# Patient Record
Sex: MALE | Race: WHITE | NOT HISPANIC OR LATINO | Employment: OTHER | URBAN - METROPOLITAN AREA
[De-identification: names, ages, dates, MRNs, and addresses within clinical notes are randomized per-mention and may not be internally consistent; named-entity substitution may affect disease eponyms.]

---

## 2017-01-09 ENCOUNTER — OFFICE VISIT (OUTPATIENT)
Dept: NEUROSURGERY | Facility: CLINIC | Age: 82
End: 2017-01-09

## 2017-01-09 VITALS
BODY MASS INDEX: 30.92 KG/M2 | DIASTOLIC BLOOD PRESSURE: 64 MMHG | SYSTOLIC BLOOD PRESSURE: 136 MMHG | WEIGHT: 216 LBS | HEIGHT: 70 IN | TEMPERATURE: 97.3 F

## 2017-01-09 DIAGNOSIS — M51.36 DEGENERATIVE DISC DISEASE, LUMBAR: ICD-10-CM

## 2017-01-09 DIAGNOSIS — Z98.890 S/P LAMINECTOMY: Primary | ICD-10-CM

## 2017-01-09 DIAGNOSIS — M48.061 SPINAL STENOSIS OF LUMBAR REGION AT MULTIPLE LEVELS: ICD-10-CM

## 2017-01-09 PROCEDURE — 99024 POSTOP FOLLOW-UP VISIT: CPT | Performed by: NEUROLOGICAL SURGERY

## 2017-01-09 RX ORDER — OXYCODONE HYDROCHLORIDE AND ACETAMINOPHEN 5; 325 MG/1; MG/1
1 TABLET ORAL EVERY 8 HOURS PRN
Qty: 60 TABLET | Refills: 0 | Status: SHIPPED | OUTPATIENT
Start: 2017-01-09 | End: 2018-05-24 | Stop reason: SDUPTHER

## 2017-01-09 NOTE — PATIENT INSTRUCTIONS
Call Dr. Richmond in 2 weeks, on a Monday or Tuesday, and leave a message with Lola (). Dr. Richmond will call you back at the end of the day as soon as he can.

## 2017-01-09 NOTE — MR AVS SNAPSHOT
Miguel Angel Crane   1/9/2017 11:30 AM   Office Visit    Dept Phone:  [phone]   Encounter #:  [id number]    Provider:  George Richmond MD   Department:  [facility]                Your Full Care Plan              Today's Medication Changes          These changes are accurate as of: 1/9/17 11:33 AM.  If you have any questions, ask your nurse or doctor.               Medication(s)that have changed:     oxyCODONE-acetaminophen 5-325 MG per tablet   Commonly known as:  PERCOCET   Take 1 tablet by mouth Every 8 (Eight) Hours As Needed for severe pain (7-10).   What changed:  Another medication with the same name was removed. Continue taking this medication, and follow the directions you see here.       traMADol 50 MG tablet   Commonly known as:  ULTRAM   Take 50 mg by mouth Every 6 (Six) Hours As Needed for moderate pain (4-6).   What changed:  Another medication with the same name was removed. Continue taking this medication, and follow the directions you see here.            Where to Get Your Medications      You can get these medications from any pharmacy     Bring a paper prescription for each of these medications     oxyCODONE-acetaminophen 5-325 MG per tablet                  Your Updated Medication List          This list is accurate as of: 1/9/17 11:33 AM.  Always use your most recent med list.                amLODIPine 5 MG tablet   Commonly known as:  NORVASC   Take 1 tablet by mouth daily.       aspirin 81 MG chewable tablet       bethanechol 25 MG tablet   Commonly known as:  URECHOLINE   Take 1 tablet by mouth 3 (Three) Times a Day.       fluticasone 27.5 MCG/SPRAY nasal spray   Commonly known as:  VERAMYST       LAXATIVE FORMULA PO       levothyroxine 50 MCG tablet   Commonly known as:  SYNTHROID, LEVOTHROID       oxyCODONE-acetaminophen 5-325 MG per tablet   Commonly known as:  PERCOCET   Take 1 tablet by mouth Every 8 (Eight) Hours As  "Needed for severe pain (7-10).       PRESERVISION AREDS 2 PO       tamsulosin 0.4 MG capsule 24 hr capsule   Commonly known as:  FLOMAX       traMADol 50 MG tablet   Commonly known as:  ULTRAM               You Were Diagnosed With        Codes Comments    S/P laminectomy    -  Primary ICD-10-CM: Z98.890  ICD-9-CM: V45.89     Spinal stenosis of lumbar region at multiple levels     ICD-10-CM: M48.06  ICD-9-CM: 724.02     Degenerative disc disease, lumbar     ICD-10-CM: M51.36  ICD-9-CM: 722.52       Instructions      Call Dr. Richmond in 2 weeks, on a Monday or Tuesday, and leave a message with Lola (). Dr. Richmond will call you back at the end of the day as soon as he can.      Patient Instructions History      Upcoming Appointments     Visit Type Date Time Department    POST-OP 2017 11:30 AM [facility]      [employer] Signup     [facility] [employer] allows you to send messages to your doctor, view your test results, renew your prescriptions, schedule appointments, and more. To sign up, go to [employer] and click on the Sign Up Now link in the New User? box. Enter your [employer] Activation Code exactly as it appears below along with the last four digits of your Social Security Number and your Date of Birth () to complete the sign-up process. If you do not sign up before the expiration date, you must request a new code.    [employer] Activation Code: [email or web address]  Expires: 2017 11:28 AM    If you have questions, you can email Amanda Huff [email or web address] or call [phone] to talk to our [employer] staff. Remember, [employer] is NOT to be used for urgent needs. For medical emergencies, dial 911.               Other Info from Your Visit           Allergies     No Known Allergies      Vital Signs     Blood Pressure Temperature Height Weight Body Mass Index Smoking Status    136/64 97.3 °F (36.3 °C) 70\" (177.8 cm) 216 lb (98 kg) 30.99 kg/m2 Former Smoker      Problems and Diagnoses " Noted     Degeneration of lumbar or lumbosacral intervertebral disc    Spinal stenosis of lumbar region at multiple levels    Status post laminectomy    -  Primary

## 2017-01-09 NOTE — LETTER
January 12, 2017     Rocio Givens MD  [facility] [address]  [place] [address]    Patient: Miguel Angel Crane   YOB: 1932   Date of Visit: 1/9/2017       Dear Dr. Chon MD:    Miguel Angel Crane was in my office today. Below is a copy of my note.    If you have questions, please do not hesitate to call me. I look forward to following Miguel Angel along with you.         Sincerely,        George Richmond MD        CC: Mana Molina MD    Miguel Angel Crane  4/9/1932  [id number]                       CURRENT WORKING DIAGNOSIS:  Spinal stenosis with neurogenic claudication; advanced degenerative osteoarthritis          MEDICAL HISTORY SINCE LAST ENCOUNTER:  This patient underwent a laminectomy L3/L4 proximally 3 months ago.  He is doing well in the context of claudication.  He will always have back pain secondary to advanced degenerative joint disease throughout.  He has an appointment to see Dr. Tee for epidural and facet blocks this week.                Past Medical History   Diagnosis Date   • Arthritis    • BPH (benign prostatic hyperplasia)    • Cataracts, both eyes    • Chronic constipation    • Coronary artery disease    • Disease of thyroid gland    • Hypertension    • Kidney stones    • Left carotid stenosis    • Low back pain    • Myocardial infarct 1993   • Stroke      left MCA, secondary to left carotid stenosis              Past Surgical History   Procedure Laterality Date   • Tonsillectomy     • Carotid stent Left 02/03/2016     sx with prior CVA   • Cataract extraction w/ intraocular lens  implant, bilateral Bilateral    • Lumbar laminectomy discectomy decompression N/A 10/14/2016     Procedure: LUMBAR LAMINECTOMY  L3-L4;  Surgeon: George Richmond MD;  [facility]: [facility];  Service:               Family History   Problem Relation Age of Onset   • Heart disease Mother    • Heart disease Father               Social History     Social History   • Marital status:      Spouse name: N/A   •  Number of children: N/A   • Years of education: N/A     Occupational History   • Not on file.     Social History Main Topics   • Smoking status: Former Smoker     Packs/day: 0.50     Years: 42.00     Quit date: 1992   • Smokeless tobacco: Never Used      Comment: quit 24 years ago   • Alcohol use No      Comment: 12/25/1988   • Drug use: No   • Sexual activity: Defer     Other Topics Concern   • Not on file     Social History Narrative            No Known Allergies           Current Outpatient Prescriptions:   •  amLODIPine (NORVASC) 5 MG tablet, Take 1 tablet by mouth daily., Disp: 270 tablet, Rfl: 1  •  aspirin 81 MG chewable tablet, Chew 81 mg daily., Disp: , Rfl:   •  bethanechol (URECHOLINE) 25 MG tablet, Take 1 tablet by mouth 3 (Three) Times a Day., Disp: 30 tablet, Rfl: 0  •  fluticasone (VERAMYST) 27.5 MCG/SPRAY nasal spray, 2 sprays into each nostril daily., Disp: , Rfl:   •  levothyroxine (SYNTHROID, LEVOTHROID) 50 MCG tablet, Take 50 mcg by mouth daily., Disp: , Rfl:   •  Misc Natural Products (LAXATIVE FORMULA PO), Take 1 tablet by mouth Daily., Disp: , Rfl:   •  Multiple Vitamins-Minerals (PRESERVISION AREDS 2 PO), Take 1 tablet by mouth 2 (Two) Times a Day., Disp: , Rfl:   •  oxyCODONE-acetaminophen (PERCOCET) 5-325 MG per tablet, Take 1 tablet by mouth Every 8 (Eight) Hours As Needed for severe pain (7-10)., Disp: 60 tablet, Rfl: 0  •  oxyCODONE-acetaminophen (PERCOCET) 7.5-325 MG per tablet, Take 1 tablet by mouth Every 6 (Six) Hours As Needed for severe pain (7-10)., Disp: 60 tablet, Rfl: 0  •  tamsulosin (FLOMAX) 0.4 MG capsule 24 hr capsule, Take 1 capsule by mouth 2 (two) times a day., Disp: , Rfl:   •  traMADol (ULTRAM) 50 MG tablet, Take 50 mg by mouth Every 6 (Six) Hours As Needed for moderate pain (4-6)., Disp: , Rfl:   •  traMADol (ULTRAM) 50 MG tablet, Take 1 tablet by mouth Every 4 (Four) Hours As Needed for moderate pain (4-6)., Disp: 60 tablet, Rfl: 1         Review of Systems    Constitutional: Positive for fatigue. Negative for activity change, appetite change, chills, diaphoresis, fever and unexpected weight change.   HENT: Positive for postnasal drip and sinus pressure. Negative for congestion, dental problem, drooling, ear discharge, ear pain, facial swelling, hearing loss, mouth sores, nosebleeds, rhinorrhea, sneezing, sore throat, tinnitus, trouble swallowing and voice change.    Eyes: Negative.  Negative for photophobia, pain, discharge, redness, itching and visual disturbance.   Respiratory: Positive for shortness of breath and wheezing. Negative for apnea, cough, choking, chest tightness and stridor.    Cardiovascular: Negative for chest pain, palpitations and leg swelling.   Gastrointestinal: Positive for constipation. Negative for abdominal distention, abdominal pain, anal bleeding, blood in stool, diarrhea, nausea, rectal pain and vomiting.   Endocrine: Negative.  Negative for cold intolerance, heat intolerance, polydipsia, polyphagia and polyuria.   Genitourinary: Negative.  Negative for decreased urine volume, difficulty urinating, dysuria, enuresis, flank pain, frequency, genital sores, hematuria and urgency.   Musculoskeletal: Positive for arthralgias, back pain and myalgias. Negative for gait problem, joint swelling, neck pain and neck stiffness.   Skin: Negative for color change, pallor, rash and wound.   Allergic/Immunologic: Negative.  Negative for environmental allergies, food allergies and immunocompromised state.   Neurological: Positive for numbness. Negative for dizziness, tremors, seizures, syncope, facial asymmetry, speech difficulty, weakness, light-headedness and headaches.   Hematological: Negative.  Negative for adenopathy. Does not bruise/bleed easily.   Psychiatric/Behavioral: Negative.  Negative for agitation, behavioral problems, confusion, decreased concentration, dysphoric mood, hallucinations, self-injury, sleep disturbance and suicidal ideas. The  "patient is not nervous/anxious and is not hyperactive.    All other systems reviewed and are negative.              Vitals:    01/09/17 1103   BP: 136/64   Temp: 97.3 °F (36.3 °C)   Weight: 216 lb (98 kg)   Height: 70\" (177.8 cm)               EXAMINATION: His strength is excellent without sensory loss.  The deep tendon reflexes are hypoactive yet are elicitable with reinforcement.  Straight leg raising [place] flip test are negative.            MEDICAL DECISION MAKING: No evidence of progressive neurological deterioration.  He is actually had a good result with relief of claudication.]           ASSESSMENT/DISPOSITION: He is to see Dr. Tee for pain management.  I have given him prescription of Percocet 5/325 to take 3 times a day as needed.  I will request that Dr. Tee and or his PCP continue with his medicines on as-needed basis.  This will eliminate him having to drive the [place] [employer] which is difficult for this 84-year-old gentleman to  a prescription.  Clearly he is not a person we need to worry about with TEGAN.]              I APPRECIATE THE OPPORTUNITY OF THIS REFERRAL. PLEASE CALL IF ANY       QUESTIONS [phone]            Scribed for George Richmond MD by Kirstin Jackson CMA. 1/9/2017  11:06 AM          I have read and concur with the information provided by the scribe.      George Richmond MD  "

## 2017-01-09 NOTE — PROGRESS NOTES
Miguel Angel Crane  4/9/1932  [id number]                       CURRENT WORKING DIAGNOSIS:  Spinal stenosis with neurogenic claudication; advanced degenerative osteoarthritis          MEDICAL HISTORY SINCE LAST ENCOUNTER:  This patient underwent a laminectomy L3/L4 proximally 3 months ago.  He is doing well in the context of claudication.  He will always have back pain secondary to advanced degenerative joint disease throughout.  He has an appointment to see Dr. Tee for epidural and facet blocks this week.                Past Medical History   Diagnosis Date   • Arthritis    • BPH (benign prostatic hyperplasia)    • Cataracts, both eyes    • Chronic constipation    • Coronary artery disease    • Disease of thyroid gland    • Hypertension    • Kidney stones    • Left carotid stenosis    • Low back pain    • Myocardial infarct 1993   • Stroke      left MCA, secondary to left carotid stenosis              Past Surgical History   Procedure Laterality Date   • Tonsillectomy     • Carotid stent Left 02/03/2016     sx with prior CVA   • Cataract extraction w/ intraocular lens  implant, bilateral Bilateral    • Lumbar laminectomy discectomy decompression N/A 10/14/2016     Procedure: LUMBAR LAMINECTOMY  L3-L4;  Surgeon: George Richmond MD;  Location: [facility];  Service:               Family History   Problem Relation Age of Onset   • Heart disease Mother    • Heart disease Father               Social History     Social History   • Marital status:      Spouse name: N/A   • Number of children: N/A   • Years of education: N/A     Occupational History   • Not on file.     Social History Main Topics   • Smoking status: Former Smoker     Packs/day: 0.50     Years: 42.00     Quit date: 1992   • Smokeless tobacco: Never Used      Comment: quit 24 years ago   • Alcohol use No      Comment: 12/25/1988   • Drug use: No   • Sexual activity: Defer     Other Topics Concern   • Not on file     Social History Narrative             No Known Allergies           Current Outpatient Prescriptions:   •  amLODIPine (NORVASC) 5 MG tablet, Take 1 tablet by mouth daily., Disp: 270 tablet, Rfl: 1  •  aspirin 81 MG chewable tablet, Chew 81 mg daily., Disp: , Rfl:   •  bethanechol (URECHOLINE) 25 MG tablet, Take 1 tablet by mouth 3 (Three) Times a Day., Disp: 30 tablet, Rfl: 0  •  fluticasone (VERAMYST) 27.5 MCG/SPRAY nasal spray, 2 sprays into each nostril daily., Disp: , Rfl:   •  levothyroxine (SYNTHROID, LEVOTHROID) 50 MCG tablet, Take 50 mcg by mouth daily., Disp: , Rfl:   •  Misc Natural Products (LAXATIVE FORMULA PO), Take 1 tablet by mouth Daily., Disp: , Rfl:   •  Multiple Vitamins-Minerals (PRESERVISION AREDS 2 PO), Take 1 tablet by mouth 2 (Two) Times a Day., Disp: , Rfl:   •  oxyCODONE-acetaminophen (PERCOCET) 5-325 MG per tablet, Take 1 tablet by mouth Every 8 (Eight) Hours As Needed for severe pain (7-10)., Disp: 60 tablet, Rfl: 0  •  oxyCODONE-acetaminophen (PERCOCET) 7.5-325 MG per tablet, Take 1 tablet by mouth Every 6 (Six) Hours As Needed for severe pain (7-10)., Disp: 60 tablet, Rfl: 0  •  tamsulosin (FLOMAX) 0.4 MG capsule 24 hr capsule, Take 1 capsule by mouth 2 (two) times a day., Disp: , Rfl:   •  traMADol (ULTRAM) 50 MG tablet, Take 50 mg by mouth Every 6 (Six) Hours As Needed for moderate pain (4-6)., Disp: , Rfl:   •  traMADol (ULTRAM) 50 MG tablet, Take 1 tablet by mouth Every 4 (Four) Hours As Needed for moderate pain (4-6)., Disp: 60 tablet, Rfl: 1         Review of Systems   Constitutional: Positive for fatigue. Negative for activity change, appetite change, chills, diaphoresis, fever and unexpected weight change.   HENT: Positive for postnasal drip and sinus pressure. Negative for congestion, dental problem, drooling, ear discharge, ear pain, facial swelling, hearing loss, mouth sores, nosebleeds, rhinorrhea, sneezing, sore throat, tinnitus, trouble swallowing and voice change.    Eyes: Negative.  Negative for photophobia,  "pain, discharge, redness, itching and visual disturbance.   Respiratory: Positive for shortness of breath and wheezing. Negative for apnea, cough, choking, chest tightness and stridor.    Cardiovascular: Negative for chest pain, palpitations and leg swelling.   Gastrointestinal: Positive for constipation. Negative for abdominal distention, abdominal pain, anal bleeding, blood in stool, diarrhea, nausea, rectal pain and vomiting.   Endocrine: Negative.  Negative for cold intolerance, heat intolerance, polydipsia, polyphagia and polyuria.   Genitourinary: Negative.  Negative for decreased urine volume, difficulty urinating, dysuria, enuresis, flank pain, frequency, genital sores, hematuria and urgency.   Musculoskeletal: Positive for arthralgias, back pain and myalgias. Negative for gait problem, joint swelling, neck pain and neck stiffness.   Skin: Negative for color change, pallor, rash and wound.   Allergic/Immunologic: Negative.  Negative for environmental allergies, food allergies and immunocompromised state.   Neurological: Positive for numbness. Negative for dizziness, tremors, seizures, syncope, facial asymmetry, speech difficulty, weakness, light-headedness and headaches.   Hematological: Negative.  Negative for adenopathy. Does not bruise/bleed easily.   Psychiatric/Behavioral: Negative.  Negative for agitation, behavioral problems, confusion, decreased concentration, dysphoric mood, hallucinations, self-injury, sleep disturbance and suicidal ideas. The patient is not nervous/anxious and is not hyperactive.    All other systems reviewed and are negative.              Vitals:    01/09/17 1103   BP: 136/64   Temp: 97.3 °F (36.3 °C)   Weight: 216 lb (98 kg)   Height: 70\" (177.8 cm)               EXAMINATION: His strength is excellent without sensory loss.  The deep tendon reflexes are hypoactive yet are elicitable with reinforcement.  Straight leg raising [place] flip test are negative.            MEDICAL " DECISION MAKING: No evidence of progressive neurological deterioration.  He is actually had a good result with relief of claudication.]           ASSESSMENT/DISPOSITION: He is to see Dr. Tee for pain management.  I have given him prescription of Percocet 5/325 to take 3 times a day as needed.  I will request that Dr. Tee and or his PCP continue with his medicines on as-needed basis.  This will eliminate him having to drive the [employer] which is difficult for this 84-year-old gentleman to  a prescription.  Clearly he is not a person we need to worry about with TEGAN.]              I APPRECIATE THE OPPORTUNITY OF THIS REFERRAL. PLEASE CALL IF ANY       QUESTIONS [phone]            Scribed for George Richmond MD by Kirstin Jackson CMA. 1/9/2017  11:06 AM          I have read and concur with the information provided by the scribe.      George Richmond MD

## 2017-01-23 ENCOUNTER — TELEPHONE (OUTPATIENT)
Dept: NEUROSURGERY | Facility: CLINIC | Age: 82
End: 2017-01-23

## 2017-01-23 NOTE — TELEPHONE ENCOUNTER
----- Message from Lola Perez sent at 1/23/2017 10:20 AM EST -----  Contact: [phone]  PATIENT CALLING TO REPORT ON HIS CONDITION.  STATES HE IS DOING OKAY.  INJECTION THERAPY IS HELPING.      PATIENT IS IN EPIC.

## 2017-01-30 ENCOUNTER — TELEPHONE (OUTPATIENT)
Dept: NEUROSURGERY | Facility: CLINIC | Age: 82
End: 2017-01-30

## 2017-01-30 NOTE — TELEPHONE ENCOUNTER
----- Message from Lola Perez sent at 1/30/2017  4:42 PM EST -----  Contact: [phone]  PATIENT CALLING TO REPORT ON HIS CONDITION.  STATES HE IS DOING BETTER.  STATES HE CAN WALK ON LEG AND STILL TAKING SHOTS THAT ARE HELPFUL.    STATES TO SAY THANK YOU VERY MUCH.

## 2017-10-23 RX ORDER — AMLODIPINE BESYLATE 5 MG/1
5 TABLET ORAL DAILY
Qty: 30 TABLET | Refills: 2 | Status: SHIPPED | OUTPATIENT
Start: 2017-10-23 | End: 2018-01-19 | Stop reason: SDUPTHER

## 2017-10-23 NOTE — TELEPHONE ENCOUNTER
Provider:  Basilio  Pharmacy: [facility]  Surgery:  LAMI L3/4  Surgery Date:  10/14/16  Last visit:   01/09/17    Reason for call:       Automated refill request from patient's pharmacy

## 2018-01-19 RX ORDER — AMLODIPINE BESYLATE 5 MG/1
TABLET ORAL
Qty: 30 TABLET | Refills: 2 | Status: SHIPPED | OUTPATIENT
Start: 2018-01-19 | End: 2018-04-16 | Stop reason: SDUPTHER

## 2018-01-19 NOTE — TELEPHONE ENCOUNTER
Provider:  Basilio  Caller:  gilberto    Phone #:  automated  Surgery:  Lumbar Lami L3-4  Surgery Date:  10/14/16  Last visit:   1/9/17  Next visit: n/a    TEGAN:         Reason for call:       Amlodipine refill

## 2018-02-02 ENCOUNTER — DOCUMENTATION (OUTPATIENT)
Dept: NEUROSURGERY | Facility: CLINIC | Age: 83
End: 2018-02-02

## 2018-02-02 DIAGNOSIS — M51.36 DEGENERATIVE DISC DISEASE, LUMBAR: Primary | ICD-10-CM

## 2018-02-02 DIAGNOSIS — M48.062 SPINAL STENOSIS OF LUMBAR REGION WITH NEUROGENIC CLAUDICATION: ICD-10-CM

## 2018-02-02 DIAGNOSIS — M48.061 SPINAL STENOSIS OF LUMBAR REGION AT MULTIPLE LEVELS: ICD-10-CM

## 2018-02-02 NOTE — PROGRESS NOTES
Patient had laminectomy for severe spinal stenosis and neurogenic claudication. He continues to have significant arthritic pain and has sent a request to  requesting Percocet 5/325 qid. We will mail per Dr. Richmond.  Percocet5/325 qid, #[phone].    Shyanne Mack PA-C

## 2018-04-05 ENCOUNTER — DOCUMENTATION (OUTPATIENT)
Dept: NEUROSURGERY | Facility: CLINIC | Age: 83
End: 2018-04-05

## 2018-04-05 NOTE — PROGRESS NOTES
ATTENTION:      PT HAS BEEN RECEIVING PERCOCET FROM [facility], PM AND OUR OFFICE.  [facility] CONTACTED OUR OFFICE TO MAKE US AWARE.

## 2018-04-16 RX ORDER — AMLODIPINE BESYLATE 5 MG/1
TABLET ORAL
Qty: 30 TABLET | Refills: 2 | Status: SHIPPED | OUTPATIENT
Start: 2018-04-16 | End: 2018-07-30 | Stop reason: SDUPTHER

## 2018-04-16 NOTE — TELEPHONE ENCOUNTER
Provider:  Basilio  Pharmacy: [facility]  Surgery:  LAMI L3/4  Surgery Date:  10/14/16  Last visit:   01/09/17  Next visit: none scheduled    TEGAN:     Reason for call:       Automated refill request from patient's pharmacy

## 2018-04-30 ENCOUNTER — TELEPHONE (OUTPATIENT)
Dept: NEUROSURGERY | Facility: CLINIC | Age: 83
End: 2018-04-30

## 2018-05-01 NOTE — TELEPHONE ENCOUNTER
Please inform patient that we will not be refilling his pain medications. He needs to follow-up with his PCP or PM doctor to have this taken care of. If he is having new/different symptoms we would happily have him see us in office and re-evaluate, but since he is has not visited us since 2017 and we are following on a PRN basis we would feel more comfortable if his pain medicines were managed by his doctor who sees him regularly  Thanks, Cristina

## 2018-05-21 ENCOUNTER — TELEPHONE (OUTPATIENT)
Dept: NEUROSURGERY | Facility: CLINIC | Age: 83
End: 2018-05-21

## 2018-05-21 NOTE — TELEPHONE ENCOUNTER
Provider: Basilio    Caller: pt wife  Time of call: 1056    Phone #: [phone]  Surgery:  Lumbar lami L3/4  Surgery Date: 10/14/16   Last visit:1/9/17     Next visit: not scheduled        Reason for call:         Pt's wife left message stating that the pt would like to be seen in the office. However due to the date of last visit they will need a new referral. (I have tried calling both him and his wife to relay this information but was unable to reach them. We need to contact them to inform them of this.      family/patient

## 2018-05-24 RX ORDER — OXYCODONE HYDROCHLORIDE AND ACETAMINOPHEN 5; 325 MG/1; MG/1
1 TABLET ORAL EVERY 8 HOURS PRN
Qty: 60 TABLET | Refills: 0 | Status: SHIPPED | OUTPATIENT
Start: 2018-05-24 | End: 2019-11-05

## 2018-05-24 NOTE — TELEPHONE ENCOUNTER
Provider: Basilio    Caller: jayna   Time of call: 1143  Phone #: [phone]  Surgery:  Lumbar lami L3/4  Surgery Date: 10/14/16   Last visit:1/9/17     Next visit: none     TEGAN:       01/09/2018 Oxycodone/Acetaminophen  325MG/7.5MG  04/09/1932 [address] [facility]  [id number]  [place] [address]  02/06/2018 Oxycodone/Acetaminophen 325MG/5MG 04/09/1932 [address] George Richmond [facility] [address] [place] [address]  02/08/2018 Oxycodone/Acetaminophen  325MG/7.5MG  04/09/1932 [address] [facility]  [address]  [place] [address]  03/11/2018 Oxycodone/Acetaminophen  325MG/7.5MG  04/09/1932 [address] [facility]  [address]  [place] [address]  04/11/2018 Oxycodone/Acetaminophen  325MG/7.5MG  04/09/1932 [address] [facility]  [address]  [place] [address]  05/10/2018 Oxycodone/Acetaminophen  325MG/7.5MG  04/09/1932 [address] Chan [facility]  [address]  [place] [address]    Reason for call:         Pt left message stating that he is scheduled to see the PM clinic on 6/21 however he will be out of narcotic pain medication on the 30th of this month and wants to know if we can give him enough to last from that time until he gets in to see the PM doctor. (Tegan looks troubling)

## 2018-05-29 ENCOUNTER — TELEPHONE (OUTPATIENT)
Dept: NEUROSURGERY | Facility: CLINIC | Age: 83
End: 2018-05-29

## 2018-05-29 NOTE — TELEPHONE ENCOUNTER
Mr. Crane's PCP has said that he will no longer prescribe these medications. The patient has known lumbar stenosis, but Dr. Richmond feels that he is not a surgical candidate and that pain management is in his best interest. He is scheduled to start with PM on 6/21. We have given him a refill until that time, but are filling it at a lower dose.  Please do not schedule a follow up appointment here without approval from Dr Richmond or Deanna Mack.

## 2018-05-29 NOTE — TELEPHONE ENCOUNTER
"Provider: Basilio    Caller: teena Schilling  Time of call: 415  Phone #: [phone]  Surgery:  Lumbar lami L3/4  Surgery Date: 10/14/16   Last visit:1/9/17     Next visit: none      Reason for call:         Pharmacist called to request more information regarding our recent Rx for Percocet for him. She wants to know why we prescribed him a lower dose of narcotics when we knew that he was already receiving stronger narcotics from a different prescriber. I told her that I personally ran the [facility] and pointed out that it looked \"troubling\" and yet it was still approved however I would seek more information and call her back. She will not fill this until she is \"given a better explanation\" as to why we prescribed this.   "

## 2018-07-30 RX ORDER — AMLODIPINE BESYLATE 5 MG/1
TABLET ORAL
Qty: 30 TABLET | Refills: 2 | Status: SHIPPED | OUTPATIENT
Start: 2018-07-30 | End: 2018-11-01 | Stop reason: SDUPTHER

## 2018-07-30 NOTE — TELEPHONE ENCOUNTER
Provider:  Basilio  Pharmacy: [facility]  Surgery:  LAMI L3/4  Surgery Date:  10/14/16  Last visit:   01/09/17  Next visit: none scheduled    Reason for call:       Automated refill request from patient's pharmacy

## 2018-10-31 RX ORDER — AMLODIPINE BESYLATE 5 MG/1
TABLET ORAL
Qty: 30 TABLET | Refills: 2 | OUTPATIENT
Start: 2018-10-31

## 2018-11-01 RX ORDER — AMLODIPINE BESYLATE 5 MG/1
TABLET ORAL
Qty: 30 TABLET | Refills: 2 | Status: SHIPPED | OUTPATIENT
Start: 2018-11-01 | End: 2019-01-27 | Stop reason: SDUPTHER

## 2018-11-01 NOTE — TELEPHONE ENCOUNTER
Provider: Basilio   Caller:  Automated refill request  Surgery:  L3/4 LAMI  Surgery Date:  10/14/18  Last visit:  01/09/18  Next visit: NA    Reason for call:         Requested Prescriptions     Pending Prescriptions Disp Refills   • amLODIPine (NORVASC) 5 MG tablet [Pharmacy Med Name: AMLODIPINE 5MG TAB] 30 tablet 2     Sig: TAKE 1 TABLET BY MOUTH ONCE DAILY

## 2019-01-28 RX ORDER — AMLODIPINE BESYLATE 5 MG/1
TABLET ORAL
Qty: 30 TABLET | Refills: 2 | Status: SHIPPED | OUTPATIENT
Start: 2019-01-28 | End: 2019-04-28 | Stop reason: SDUPTHER

## 2019-01-28 NOTE — TELEPHONE ENCOUNTER
Provider: Basilio   Caller:  Automated refill request  Surgery:  L3/4 LAMI  Surgery Date:  10/14/18  Last visit:  01/09/18  Next visit: NA     Reason for call: Auto refill request from patient's pharmacy    Medication pended

## 2019-04-29 RX ORDER — AMLODIPINE BESYLATE 5 MG/1
TABLET ORAL
Qty: 90 TABLET | Refills: 0 | Status: SHIPPED | OUTPATIENT
Start: 2019-04-29 | End: 2020-01-11 | Stop reason: HOSPADM

## 2019-04-29 NOTE — TELEPHONE ENCOUNTER
Provider:  Basilio  Caller:  Automated refill request  Surgery:  L3/4 LAMI  Surgery Date:  10/14/16  Last visit:  01/09/17  Next visit: NA    Reason for call:         Requested Prescriptions     Pending Prescriptions Disp Refills   • amLODIPine (NORVASC) 5 MG tablet [Pharmacy Med Name: AMLODIPINE 5MG TAB] 90 tablet 0     Sig: TAKE 1 TABLET BY MOUTH ONCE DAILY

## 2019-07-24 RX ORDER — AMLODIPINE BESYLATE 5 MG/1
TABLET ORAL
Qty: 90 TABLET | Refills: 0 | OUTPATIENT
Start: 2019-07-24

## 2019-07-24 NOTE — TELEPHONE ENCOUNTER
We have not evaluated patient since 2017. He needs to see his PCP to have this medicine renewed as we are not managing his BP

## 2019-11-04 ENCOUNTER — TELEPHONE (OUTPATIENT)
Dept: NEUROSURGERY | Facility: CLINIC | Age: 84
End: 2019-11-04

## 2019-11-04 NOTE — TELEPHONE ENCOUNTER
Provider:  Deanna HARDIN  Caller: wife  Time of call:   4:39  Phone #:  [phone]  Surgery:   Lumbar lami  Surgery Date:  10/14/16  Last visit:   01/09/2017  Next visit: None    TEGAN:         Reason for call:     Mrs. Crane called and said the hospital in [place] told her  he has gallstones and they have not done anything about it.    She wants to know if Dr. Richmond or Deanna know of a good surgeon.

## 2019-11-05 ENCOUNTER — APPOINTMENT (OUTPATIENT)
Dept: ULTRASOUND IMAGING | Facility: HOSPITAL | Age: 84
End: 2019-11-05

## 2019-11-05 ENCOUNTER — APPOINTMENT (OUTPATIENT)
Dept: CT IMAGING | Facility: HOSPITAL | Age: 84
End: 2019-11-05

## 2019-11-05 ENCOUNTER — HOSPITAL ENCOUNTER (INPATIENT)
Facility: HOSPITAL | Age: 84
LOS: 6 days | Discharge: HOME | End: 2019-11-11
Attending: EMERGENCY MEDICINE | Admitting: HOSPITALIST

## 2019-11-05 DIAGNOSIS — N28.9 RENAL INSUFFICIENCY: ICD-10-CM

## 2019-11-05 DIAGNOSIS — Z74.09 IMPAIRED MOBILITY AND ADLS: ICD-10-CM

## 2019-11-05 DIAGNOSIS — N20.1 RIGHT URETERAL STONE: Primary | ICD-10-CM

## 2019-11-05 DIAGNOSIS — K80.20 CALCULUS OF GALLBLADDER WITHOUT CHOLECYSTITIS WITHOUT OBSTRUCTION: ICD-10-CM

## 2019-11-05 DIAGNOSIS — K86.89 PANCREATIC MASS: ICD-10-CM

## 2019-11-05 DIAGNOSIS — R10.9 RIGHT SIDED ABDOMINAL PAIN: ICD-10-CM

## 2019-11-05 DIAGNOSIS — N13.30 HYDRONEPHROSIS OF RIGHT KIDNEY: ICD-10-CM

## 2019-11-05 DIAGNOSIS — Z78.9 IMPAIRED MOBILITY AND ADLS: ICD-10-CM

## 2019-11-05 PROBLEM — R93.89 ABNORMAL CT SCAN: Status: ACTIVE | Noted: 2019-11-05

## 2019-11-05 PROBLEM — E66.9 OBESITY (BMI 30-39.9): Status: ACTIVE | Noted: 2019-11-05

## 2019-11-05 PROBLEM — K82.9 GALLBLADDER DISEASE: Status: ACTIVE | Noted: 2019-11-05

## 2019-11-05 PROBLEM — N17.9 AKI (ACUTE KIDNEY INJURY): Status: ACTIVE | Noted: 2019-11-05

## 2019-11-05 LAB
ALBUMIN SERPL-MCNC: 3.4 G/DL (ref 3.5–5.2)
ALBUMIN/GLOB SERPL: 1 G/DL
ALP SERPL-CCNC: 91 U/L (ref 39–117)
ALT SERPL W P-5'-P-CCNC: 18 U/L (ref 1–41)
ANION GAP SERPL CALCULATED.3IONS-SCNC: 11 MMOL/L (ref 5–15)
AST SERPL-CCNC: 20 U/L (ref 1–40)
BASOPHILS # BLD AUTO: 0.05 10*3/MM3 (ref 0–0.2)
BASOPHILS NFR BLD AUTO: 0.8 % (ref 0–1.5)
BILIRUB SERPL-MCNC: 0.5 MG/DL (ref 0.2–1.2)
BILIRUB UR QL STRIP: NEGATIVE
BUN BLD-MCNC: 13 MG/DL (ref 8–23)
BUN/CREAT SERPL: 6.5 (ref 7–25)
CALCIUM SPEC-SCNC: 8.9 MG/DL (ref 8.6–10.5)
CHLORIDE SERPL-SCNC: 98 MMOL/L (ref 98–107)
CLARITY UR: CLEAR
CO2 SERPL-SCNC: 29 MMOL/L (ref 22–29)
COLOR UR: YELLOW
CREAT BLD-MCNC: 2 MG/DL (ref 0.76–1.27)
D-LACTATE SERPL-SCNC: 1.4 MMOL/L (ref 0.5–2)
DEPRECATED RDW RBC AUTO: 48.2 FL (ref 37–54)
EOSINOPHIL # BLD AUTO: 0.27 10*3/MM3 (ref 0–0.4)
EOSINOPHIL NFR BLD AUTO: 4.1 % (ref 0.3–6.2)
ERYTHROCYTE [DISTWIDTH] IN BLOOD BY AUTOMATED COUNT: 13.7 % (ref 12.3–15.4)
GFR SERPL CREATININE-BSD FRML MDRD: 32 ML/MIN/1.73
GLOBULIN UR ELPH-MCNC: 3.5 GM/DL
GLUCOSE BLD-MCNC: 114 MG/DL (ref 65–99)
GLUCOSE UR STRIP-MCNC: NEGATIVE MG/DL
HCT VFR BLD AUTO: 39.1 % (ref 37.5–51)
HGB BLD-MCNC: 12.8 G/DL (ref 13–17.7)
HGB UR QL STRIP.AUTO: NEGATIVE
HOLD SPECIMEN: NORMAL
HOLD SPECIMEN: NORMAL
IMM GRANULOCYTES # BLD AUTO: 0.06 10*3/MM3 (ref 0–0.05)
IMM GRANULOCYTES NFR BLD AUTO: 0.9 % (ref 0–0.5)
KETONES UR QL STRIP: ABNORMAL
LEUKOCYTE ESTERASE UR QL STRIP.AUTO: NEGATIVE
LIPASE SERPL-CCNC: 9 U/L (ref 13–60)
LYMPHOCYTES # BLD AUTO: 1.76 10*3/MM3 (ref 0.7–3.1)
LYMPHOCYTES NFR BLD AUTO: 26.9 % (ref 19.6–45.3)
MCH RBC QN AUTO: 31.4 PG (ref 26.6–33)
MCHC RBC AUTO-ENTMCNC: 32.7 G/DL (ref 31.5–35.7)
MCV RBC AUTO: 96.1 FL (ref 79–97)
MONOCYTES # BLD AUTO: 0.59 10*3/MM3 (ref 0.1–0.9)
MONOCYTES NFR BLD AUTO: 9 % (ref 5–12)
NEUTROPHILS # BLD AUTO: 3.82 10*3/MM3 (ref 1.7–7)
NEUTROPHILS NFR BLD AUTO: 58.3 % (ref 42.7–76)
NITRITE UR QL STRIP: NEGATIVE
NRBC BLD AUTO-RTO: 0 /100 WBC (ref 0–0.2)
PH UR STRIP.AUTO: >=9 [PH] (ref 5–8)
PLATELET # BLD AUTO: 218 10*3/MM3 (ref 140–450)
PMV BLD AUTO: 9.7 FL (ref 6–12)
POTASSIUM BLD-SCNC: 4.1 MMOL/L (ref 3.5–5.2)
PROT SERPL-MCNC: 6.9 G/DL (ref 6–8.5)
PROT UR QL STRIP: NEGATIVE
RBC # BLD AUTO: 4.07 10*6/MM3 (ref 4.14–5.8)
SODIUM BLD-SCNC: 138 MMOL/L (ref 136–145)
SP GR UR STRIP: 1.02 (ref 1–1.03)
UROBILINOGEN UR QL STRIP: ABNORMAL
WBC NRBC COR # BLD: 6.55 10*3/MM3 (ref 3.4–10.8)
WHOLE BLOOD HOLD SPECIMEN: NORMAL
WHOLE BLOOD HOLD SPECIMEN: NORMAL

## 2019-11-05 PROCEDURE — 81003 URINALYSIS AUTO W/O SCOPE: CPT | Performed by: EMERGENCY MEDICINE

## 2019-11-05 PROCEDURE — 25010000002 ONDANSETRON PER 1 MG: Performed by: EMERGENCY MEDICINE

## 2019-11-05 PROCEDURE — 25010000002 MORPHINE PER 10 MG: Performed by: EMERGENCY MEDICINE

## 2019-11-05 PROCEDURE — 83690 ASSAY OF LIPASE: CPT | Performed by: EMERGENCY MEDICINE

## 2019-11-05 PROCEDURE — 76705 ECHO EXAM OF ABDOMEN: CPT

## 2019-11-05 PROCEDURE — 99284 EMERGENCY DEPT VISIT MOD MDM: CPT

## 2019-11-05 PROCEDURE — 80053 COMPREHEN METABOLIC PANEL: CPT | Performed by: EMERGENCY MEDICINE

## 2019-11-05 PROCEDURE — 85025 COMPLETE CBC W/AUTO DIFF WBC: CPT

## 2019-11-05 PROCEDURE — 99223 1ST HOSP IP/OBS HIGH 75: CPT | Performed by: INTERNAL MEDICINE

## 2019-11-05 PROCEDURE — 83605 ASSAY OF LACTIC ACID: CPT | Performed by: EMERGENCY MEDICINE

## 2019-11-05 PROCEDURE — 74176 CT ABD & PELVIS W/O CONTRAST: CPT

## 2019-11-05 RX ORDER — POTASSIUM CHLORIDE 750 MG/1
40 CAPSULE, EXTENDED RELEASE ORAL AS NEEDED
Status: DISCONTINUED | OUTPATIENT
Start: 2019-11-05 | End: 2019-11-11 | Stop reason: HOSPADM

## 2019-11-05 RX ORDER — NALOXONE HCL 0.4 MG/ML
0.4 VIAL (ML) INJECTION
Status: DISCONTINUED | OUTPATIENT
Start: 2019-11-05 | End: 2019-11-09

## 2019-11-05 RX ORDER — MAGNESIUM SULFATE HEPTAHYDRATE 40 MG/ML
4 INJECTION, SOLUTION INTRAVENOUS AS NEEDED
Status: DISCONTINUED | OUTPATIENT
Start: 2019-11-05 | End: 2019-11-11 | Stop reason: HOSPADM

## 2019-11-05 RX ORDER — ONDANSETRON 4 MG/1
4 TABLET, FILM COATED ORAL EVERY 6 HOURS PRN
Status: DISCONTINUED | OUTPATIENT
Start: 2019-11-05 | End: 2019-11-11 | Stop reason: HOSPADM

## 2019-11-05 RX ORDER — ONDANSETRON 2 MG/ML
4 INJECTION INTRAMUSCULAR; INTRAVENOUS ONCE
Status: COMPLETED | OUTPATIENT
Start: 2019-11-05 | End: 2019-11-05

## 2019-11-05 RX ORDER — SODIUM CHLORIDE 0.9 % (FLUSH) 0.9 %
10 SYRINGE (ML) INJECTION EVERY 12 HOURS SCHEDULED
Status: DISCONTINUED | OUTPATIENT
Start: 2019-11-05 | End: 2019-11-11 | Stop reason: HOSPADM

## 2019-11-05 RX ORDER — POTASSIUM CHLORIDE 1.5 G/1.77G
40 POWDER, FOR SOLUTION ORAL AS NEEDED
Status: DISCONTINUED | OUTPATIENT
Start: 2019-11-05 | End: 2019-11-11 | Stop reason: HOSPADM

## 2019-11-05 RX ORDER — SODIUM CHLORIDE 0.9 % (FLUSH) 0.9 %
10 SYRINGE (ML) INJECTION AS NEEDED
Status: DISCONTINUED | OUTPATIENT
Start: 2019-11-05 | End: 2019-11-11 | Stop reason: HOSPADM

## 2019-11-05 RX ORDER — ONDANSETRON 2 MG/ML
4 INJECTION INTRAMUSCULAR; INTRAVENOUS EVERY 6 HOURS PRN
Status: DISCONTINUED | OUTPATIENT
Start: 2019-11-05 | End: 2019-11-11 | Stop reason: HOSPADM

## 2019-11-05 RX ORDER — HYDROMORPHONE HYDROCHLORIDE 1 MG/ML
0.5 INJECTION, SOLUTION INTRAMUSCULAR; INTRAVENOUS; SUBCUTANEOUS
Status: DISCONTINUED | OUTPATIENT
Start: 2019-11-05 | End: 2019-11-09

## 2019-11-05 RX ORDER — SODIUM CHLORIDE 9 MG/ML
75 INJECTION, SOLUTION INTRAVENOUS CONTINUOUS
Status: DISCONTINUED | OUTPATIENT
Start: 2019-11-05 | End: 2019-11-09

## 2019-11-05 RX ORDER — MORPHINE SULFATE 4 MG/ML
4 INJECTION, SOLUTION INTRAMUSCULAR; INTRAVENOUS ONCE
Status: COMPLETED | OUTPATIENT
Start: 2019-11-05 | End: 2019-11-05

## 2019-11-05 RX ORDER — ACETAMINOPHEN 160 MG/5ML
650 SOLUTION ORAL EVERY 4 HOURS PRN
Status: DISCONTINUED | OUTPATIENT
Start: 2019-11-05 | End: 2019-11-11 | Stop reason: HOSPADM

## 2019-11-05 RX ORDER — AMOXICILLIN 250 MG
2 CAPSULE ORAL NIGHTLY
Status: DISCONTINUED | OUTPATIENT
Start: 2019-11-05 | End: 2019-11-11 | Stop reason: HOSPADM

## 2019-11-05 RX ORDER — GABAPENTIN 100 MG/1
200 CAPSULE ORAL NIGHTLY
COMMUNITY
End: 2020-02-21

## 2019-11-05 RX ORDER — POTASSIUM CHLORIDE 7.45 MG/ML
10 INJECTION INTRAVENOUS
Status: DISCONTINUED | OUTPATIENT
Start: 2019-11-05 | End: 2019-11-11 | Stop reason: HOSPADM

## 2019-11-05 RX ORDER — MAGNESIUM SULFATE HEPTAHYDRATE 40 MG/ML
2 INJECTION, SOLUTION INTRAVENOUS AS NEEDED
Status: DISCONTINUED | OUTPATIENT
Start: 2019-11-05 | End: 2019-11-11 | Stop reason: HOSPADM

## 2019-11-05 RX ORDER — ACETAMINOPHEN 650 MG/1
650 SUPPOSITORY RECTAL EVERY 4 HOURS PRN
Status: DISCONTINUED | OUTPATIENT
Start: 2019-11-05 | End: 2019-11-11 | Stop reason: HOSPADM

## 2019-11-05 RX ORDER — LEVOTHYROXINE SODIUM 0.05 MG/1
50 TABLET ORAL
Status: DISCONTINUED | OUTPATIENT
Start: 2019-11-06 | End: 2019-11-11 | Stop reason: HOSPADM

## 2019-11-05 RX ORDER — FLUTICASONE PROPIONATE 50 MCG
2 SPRAY, SUSPENSION (ML) NASAL DAILY
Status: DISCONTINUED | OUTPATIENT
Start: 2019-11-05 | End: 2019-11-11 | Stop reason: HOSPADM

## 2019-11-05 RX ORDER — GABAPENTIN 100 MG/1
200 CAPSULE ORAL NIGHTLY
Status: DISCONTINUED | OUTPATIENT
Start: 2019-11-05 | End: 2019-11-11 | Stop reason: HOSPADM

## 2019-11-05 RX ORDER — OXYCODONE HYDROCHLORIDE AND ACETAMINOPHEN 5; 325 MG/1; MG/1
1 TABLET ORAL EVERY 4 HOURS PRN
Status: DISCONTINUED | OUTPATIENT
Start: 2019-11-05 | End: 2019-11-11 | Stop reason: HOSPADM

## 2019-11-05 RX ORDER — ACETAMINOPHEN 325 MG/1
650 TABLET ORAL EVERY 4 HOURS PRN
Status: DISCONTINUED | OUTPATIENT
Start: 2019-11-05 | End: 2019-11-11 | Stop reason: HOSPADM

## 2019-11-05 RX ORDER — TAMSULOSIN HYDROCHLORIDE 0.4 MG/1
0.4 CAPSULE ORAL DAILY
Status: DISCONTINUED | OUTPATIENT
Start: 2019-11-05 | End: 2019-11-11 | Stop reason: HOSPADM

## 2019-11-05 RX ORDER — AMLODIPINE BESYLATE 10 MG/1
5 TABLET ORAL DAILY
Status: DISCONTINUED | OUTPATIENT
Start: 2019-11-05 | End: 2019-11-11 | Stop reason: HOSPADM

## 2019-11-05 RX ADMIN — ONDANSETRON 4 MG: 2 INJECTION INTRAMUSCULAR; INTRAVENOUS at 13:37

## 2019-11-05 RX ADMIN — SODIUM CHLORIDE 125 ML/HR: 9 INJECTION, SOLUTION INTRAVENOUS at 18:33

## 2019-11-05 RX ADMIN — OXYCODONE HYDROCHLORIDE AND ACETAMINOPHEN 1 TABLET: 5; 325 TABLET ORAL at 18:40

## 2019-11-05 RX ADMIN — SENNOSIDES AND DOCUSATE SODIUM 2 TABLET: 8.6; 5 TABLET ORAL at 20:36

## 2019-11-05 RX ADMIN — FLUTICASONE PROPIONATE 2 SPRAY: 50 SPRAY, METERED NASAL at 20:36

## 2019-11-05 RX ADMIN — GABAPENTIN 200 MG: 100 CAPSULE ORAL at 20:36

## 2019-11-05 RX ADMIN — MAGNESIUM HYDROXIDE 10 ML: 2400 SUSPENSION ORAL at 20:36

## 2019-11-05 RX ADMIN — SODIUM CHLORIDE 1000 ML: 9 INJECTION, SOLUTION INTRAVENOUS at 13:37

## 2019-11-05 RX ADMIN — AMLODIPINE BESYLATE 5 MG: 10 TABLET ORAL at 18:33

## 2019-11-05 RX ADMIN — MORPHINE SULFATE 4 MG: 4 INJECTION INTRAVENOUS at 13:38

## 2019-11-05 RX ADMIN — TAMSULOSIN HYDROCHLORIDE 0.4 MG: 0.4 CAPSULE ORAL at 20:36

## 2019-11-06 ENCOUNTER — APPOINTMENT (OUTPATIENT)
Dept: GENERAL RADIOLOGY | Facility: HOSPITAL | Age: 84
End: 2019-11-06

## 2019-11-06 ENCOUNTER — ANESTHESIA EVENT (OUTPATIENT)
Dept: PERIOP | Facility: HOSPITAL | Age: 84
End: 2019-11-06

## 2019-11-06 ENCOUNTER — ANESTHESIA (OUTPATIENT)
Dept: PERIOP | Facility: HOSPITAL | Age: 84
End: 2019-11-06

## 2019-11-06 LAB
ANION GAP SERPL CALCULATED.3IONS-SCNC: 9 MMOL/L (ref 5–15)
BUN BLD-MCNC: 13 MG/DL (ref 8–23)
BUN/CREAT SERPL: 8.1 (ref 7–25)
CALCIUM SPEC-SCNC: 8.1 MG/DL (ref 8.6–10.5)
CHLORIDE SERPL-SCNC: 102 MMOL/L (ref 98–107)
CO2 SERPL-SCNC: 28 MMOL/L (ref 22–29)
CREAT BLD-MCNC: 1.61 MG/DL (ref 0.76–1.27)
DEPRECATED RDW RBC AUTO: 49.2 FL (ref 37–54)
ERYTHROCYTE [DISTWIDTH] IN BLOOD BY AUTOMATED COUNT: 13.8 % (ref 12.3–15.4)
GFR SERPL CREATININE-BSD FRML MDRD: 41 ML/MIN/1.73
GLUCOSE BLD-MCNC: 127 MG/DL (ref 65–99)
HCT VFR BLD AUTO: 33.8 % (ref 37.5–51)
HGB BLD-MCNC: 10.8 G/DL (ref 13–17.7)
MCH RBC QN AUTO: 31.2 PG (ref 26.6–33)
MCHC RBC AUTO-ENTMCNC: 32 G/DL (ref 31.5–35.7)
MCV RBC AUTO: 97.7 FL (ref 79–97)
PLATELET # BLD AUTO: 168 10*3/MM3 (ref 140–450)
PMV BLD AUTO: 9.7 FL (ref 6–12)
POTASSIUM BLD-SCNC: 4.2 MMOL/L (ref 3.5–5.2)
RBC # BLD AUTO: 3.46 10*6/MM3 (ref 4.14–5.8)
SODIUM BLD-SCNC: 139 MMOL/L (ref 136–145)
WBC NRBC COR # BLD: 5.15 10*3/MM3 (ref 3.4–10.8)

## 2019-11-06 PROCEDURE — 80048 BASIC METABOLIC PNL TOTAL CA: CPT | Performed by: INTERNAL MEDICINE

## 2019-11-06 PROCEDURE — C1758 CATHETER, URETERAL: HCPCS | Performed by: UROLOGY

## 2019-11-06 PROCEDURE — 76000 FLUOROSCOPY <1 HR PHYS/QHP: CPT

## 2019-11-06 PROCEDURE — 99232 SBSQ HOSP IP/OBS MODERATE 35: CPT | Performed by: INTERNAL MEDICINE

## 2019-11-06 PROCEDURE — 0T768DZ DILATION OF RIGHT URETER WITH INTRALUMINAL DEVICE, VIA NATURAL OR ARTIFICIAL OPENING ENDOSCOPIC: ICD-10-PCS | Performed by: UROLOGY

## 2019-11-06 PROCEDURE — 85027 COMPLETE CBC AUTOMATED: CPT | Performed by: INTERNAL MEDICINE

## 2019-11-06 PROCEDURE — 25010000002 LEVOFLOXACIN PER 250 MG: Performed by: UROLOGY

## 2019-11-06 PROCEDURE — 25010000002 PHENYLEPHRINE PER 1 ML: Performed by: ANESTHESIOLOGY

## 2019-11-06 PROCEDURE — 25010000002 PROPOFOL 10 MG/ML EMULSION: Performed by: ANESTHESIOLOGY

## 2019-11-06 PROCEDURE — C1769 GUIDE WIRE: HCPCS | Performed by: UROLOGY

## 2019-11-06 PROCEDURE — 93005 ELECTROCARDIOGRAM TRACING: CPT | Performed by: ANESTHESIOLOGY

## 2019-11-06 PROCEDURE — C2617 STENT, NON-COR, TEM W/O DEL: HCPCS | Performed by: UROLOGY

## 2019-11-06 PROCEDURE — 25010000003 LIDOCAINE 1 % SOLUTION: Performed by: ANESTHESIOLOGY

## 2019-11-06 DEVICE — URETERAL STENT
Type: IMPLANTABLE DEVICE | Status: FUNCTIONAL
Brand: PERCUFLEX™ PLUS

## 2019-11-06 RX ORDER — FENTANYL CITRATE 50 UG/ML
50 INJECTION, SOLUTION INTRAMUSCULAR; INTRAVENOUS
Status: DISCONTINUED | OUTPATIENT
Start: 2019-11-06 | End: 2019-11-06 | Stop reason: HOSPADM

## 2019-11-06 RX ORDER — LEVOFLOXACIN 5 MG/ML
500 INJECTION, SOLUTION INTRAVENOUS ONCE
Status: COMPLETED | OUTPATIENT
Start: 2019-11-06 | End: 2019-11-06

## 2019-11-06 RX ORDER — FAMOTIDINE 10 MG/ML
20 INJECTION, SOLUTION INTRAVENOUS
Status: COMPLETED | OUTPATIENT
Start: 2019-11-06 | End: 2019-11-06

## 2019-11-06 RX ORDER — MAGNESIUM HYDROXIDE 1200 MG/15ML
LIQUID ORAL AS NEEDED
Status: DISCONTINUED | OUTPATIENT
Start: 2019-11-06 | End: 2019-11-06 | Stop reason: HOSPADM

## 2019-11-06 RX ORDER — SODIUM CHLORIDE 0.9 % (FLUSH) 0.9 %
3 SYRINGE (ML) INJECTION EVERY 12 HOURS SCHEDULED
Status: DISCONTINUED | OUTPATIENT
Start: 2019-11-06 | End: 2019-11-06 | Stop reason: HOSPADM

## 2019-11-06 RX ORDER — FAMOTIDINE 10 MG/ML
20 INJECTION, SOLUTION INTRAVENOUS ONCE
Status: DISCONTINUED | OUTPATIENT
Start: 2019-11-06 | End: 2019-11-06 | Stop reason: HOSPADM

## 2019-11-06 RX ORDER — SODIUM CHLORIDE 0.9 % (FLUSH) 0.9 %
3-10 SYRINGE (ML) INJECTION AS NEEDED
Status: DISCONTINUED | OUTPATIENT
Start: 2019-11-06 | End: 2019-11-06 | Stop reason: HOSPADM

## 2019-11-06 RX ORDER — SODIUM CHLORIDE, SODIUM LACTATE, POTASSIUM CHLORIDE, CALCIUM CHLORIDE 600; 310; 30; 20 MG/100ML; MG/100ML; MG/100ML; MG/100ML
9 INJECTION, SOLUTION INTRAVENOUS CONTINUOUS
Status: DISCONTINUED | OUTPATIENT
Start: 2019-11-06 | End: 2019-11-11 | Stop reason: HOSPADM

## 2019-11-06 RX ORDER — FAMOTIDINE 20 MG/1
20 TABLET, FILM COATED ORAL ONCE
Status: DISCONTINUED | OUTPATIENT
Start: 2019-11-06 | End: 2019-11-06 | Stop reason: HOSPADM

## 2019-11-06 RX ORDER — PROPOFOL 10 MG/ML
VIAL (ML) INTRAVENOUS AS NEEDED
Status: DISCONTINUED | OUTPATIENT
Start: 2019-11-06 | End: 2019-11-06 | Stop reason: SURG

## 2019-11-06 RX ORDER — FAMOTIDINE 20 MG/1
20 TABLET, FILM COATED ORAL
Status: COMPLETED | OUTPATIENT
Start: 2019-11-06 | End: 2019-11-06

## 2019-11-06 RX ORDER — LIDOCAINE HYDROCHLORIDE 10 MG/ML
INJECTION, SOLUTION INFILTRATION; PERINEURAL AS NEEDED
Status: DISCONTINUED | OUTPATIENT
Start: 2019-11-06 | End: 2019-11-06 | Stop reason: SURG

## 2019-11-06 RX ORDER — SODIUM CHLORIDE, SODIUM LACTATE, POTASSIUM CHLORIDE, CALCIUM CHLORIDE 600; 310; 30; 20 MG/100ML; MG/100ML; MG/100ML; MG/100ML
9 INJECTION, SOLUTION INTRAVENOUS CONTINUOUS PRN
Status: DISCONTINUED | OUTPATIENT
Start: 2019-11-06 | End: 2019-11-11 | Stop reason: HOSPADM

## 2019-11-06 RX ORDER — LIDOCAINE HYDROCHLORIDE 10 MG/ML
0.5 INJECTION, SOLUTION EPIDURAL; INFILTRATION; INTRACAUDAL; PERINEURAL ONCE AS NEEDED
Status: DISCONTINUED | OUTPATIENT
Start: 2019-11-06 | End: 2019-11-06 | Stop reason: HOSPADM

## 2019-11-06 RX ORDER — SODIUM CHLORIDE 9 MG/ML
INJECTION, SOLUTION INTRAVENOUS AS NEEDED
Status: DISCONTINUED | OUTPATIENT
Start: 2019-11-06 | End: 2019-11-06 | Stop reason: HOSPADM

## 2019-11-06 RX ADMIN — FLUTICASONE PROPIONATE 2 SPRAY: 50 SPRAY, METERED NASAL at 09:39

## 2019-11-06 RX ADMIN — PROPOFOL 100 MG: 10 INJECTION, EMULSION INTRAVENOUS at 19:24

## 2019-11-06 RX ADMIN — OXYCODONE HYDROCHLORIDE AND ACETAMINOPHEN 1 TABLET: 5; 325 TABLET ORAL at 10:03

## 2019-11-06 RX ADMIN — TAMSULOSIN HYDROCHLORIDE 0.4 MG: 0.4 CAPSULE ORAL at 09:38

## 2019-11-06 RX ADMIN — LEVOFLOXACIN 500 MG: 5 INJECTION, SOLUTION INTRAVENOUS at 19:25

## 2019-11-06 RX ADMIN — SODIUM CHLORIDE, POTASSIUM CHLORIDE, SODIUM LACTATE AND CALCIUM CHLORIDE 9 ML/HR: 600; 310; 30; 20 INJECTION, SOLUTION INTRAVENOUS at 16:02

## 2019-11-06 RX ADMIN — SODIUM CHLORIDE 125 ML/HR: 9 INJECTION, SOLUTION INTRAVENOUS at 09:44

## 2019-11-06 RX ADMIN — SODIUM CHLORIDE, POTASSIUM CHLORIDE, SODIUM LACTATE AND CALCIUM CHLORIDE: 600; 310; 30; 20 INJECTION, SOLUTION INTRAVENOUS at 19:16

## 2019-11-06 RX ADMIN — PHENYLEPHRINE HYDROCHLORIDE 80 MCG: 10 INJECTION INTRAVENOUS at 19:24

## 2019-11-06 RX ADMIN — SODIUM CHLORIDE, PRESERVATIVE FREE 10 ML: 5 INJECTION INTRAVENOUS at 09:40

## 2019-11-06 RX ADMIN — FAMOTIDINE 20 MG: 10 INJECTION, SOLUTION INTRAVENOUS at 16:02

## 2019-11-06 RX ADMIN — LEVOTHYROXINE SODIUM 50 MCG: 50 TABLET ORAL at 07:02

## 2019-11-06 RX ADMIN — OXYCODONE HYDROCHLORIDE AND ACETAMINOPHEN 1 TABLET: 5; 325 TABLET ORAL at 04:09

## 2019-11-06 RX ADMIN — AMLODIPINE BESYLATE 5 MG: 10 TABLET ORAL at 09:38

## 2019-11-06 RX ADMIN — GABAPENTIN 200 MG: 100 CAPSULE ORAL at 21:44

## 2019-11-06 RX ADMIN — LIDOCAINE HYDROCHLORIDE 25 MG: 10 INJECTION, SOLUTION INFILTRATION; PERINEURAL at 19:24

## 2019-11-06 NOTE — ANESTHESIA PREPROCEDURE EVALUATION
Anesthesia Evaluation     Patient summary reviewed and Nursing notes reviewed   no history of anesthetic complications:  NPO Solid Status: > 8 hours  NPO Liquid Status: > 8 hours           Airway   Mallampati: III  TM distance: <3 FB  Neck ROM: full  no difficulty expected  Dental    (+) edentulous    Pulmonary - normal exam   (+) a smoker Former,   Cardiovascular - normal exam    ECG reviewed    (+) hypertension, past MI , CAD, PVD,  carotid artery disease (has had carotid stent) left carotid  (-) dysrhythmias, angina      Neuro/Psych  (+) CVA (right sided weakness),     GI/Hepatic/Renal/Endo    (+) obesity,   renal disease stones, thyroid problem hypothyroidism  (-) GERD, hepatitis, liver disease, diabetes    Musculoskeletal     Abdominal  - normal exam    Bowel sounds: normal.   Substance History      OB/GYN          Other   arthritis,        Other Comment: BPH                  Anesthesia Plan    ASA 4     general     intravenous induction     Anesthetic plan, all risks, benefits, and alternatives have been provided, discussed and informed consent has been obtained with: patient.    Plan discussed with CRNA.

## 2019-11-06 NOTE — H&P (VIEW-ONLY)
Urology Consult    Referring Provider: Valentin  Reason for Consultation: ureteral stone    Patient Care Team:  Rocio Givens MD as PCP - General    Chief complaint back and abdominal pain    Subjective .     History of present illness: Patient with a 2-day history of right flank pain with radiation the right side of the abdomen.  The patient has passed small stones previously.  There is no gross hematuria or dysuria.  There is no nausea vomiting or fever.  The pain is been intermittent in nature but at times severe.  CT scan shows a 7 mm stone in the proximal to mid right ureter.    Review of Systems  Pertinent items are noted in HPI, all other systems reviewed and negative    History  Past Medical History:   Diagnosis Date   • Arthritis    • BPH (benign prostatic hyperplasia)    • Cataracts, both eyes    • Chronic constipation    • Coronary artery disease    • Disease of thyroid gland    • Hypertension    • Kidney stones    • Left carotid stenosis    • Low back pain    • Myocardial infarct (CMS/HCC) 1993   • Stroke (CMS/HCC)     left MCA, secondary to left carotid stenosis       Objective     Vital Signs   Temp:  [97.5 °F (36.4 °C)-98.3 °F (36.8 °C)] 98.3 °F (36.8 °C)  Heart Rate:  [79-98] 86  Resp:  [16-18] 18  BP: ()/(51-84) 141/75    Physical Exam:     General Appearance:    Alert, cooperative, in no acute distress   Head:    Normocephalic, without obvious abnormality, atraumatic   Eyes:            Lids and lashes normal, conjunctivae and sclerae normal, no   icterus, no pallor, corneas clear, PERRLA   Ears:    Ears appear intact with no abnormalities noted   Throat:   No oral lesions, no thrush, oral mucosa moist   Neck:   No adenopathy, supple, trachea midline, no thyromegaly, no   carotid bruit, no JVD   Back:     No kyphosis present, no scoliosis present, no skin lesions,      erythema or scars, no tenderness to percussion or                   palpation,   range of motion normal   Lungs:     Clear to  auscultation,respirations regular, even and                  unlabored    Heart:    Regular rhythm and normal rate, normal S1 and S2, no            murmur, no gallop, no rub, no click   Chest Wall:    No abnormalities observed   Abdomen:     Normal bowel sounds, no masses, no organomegaly, soft        non-tender, non-distended, no guarding, no rebound                tenderness   Rectal:     Deferred   Extremities:   Moves all extremities well, no edema, no cyanosis, no             redness   Pulses:   Pulses palpable and equal bilaterally   Skin:   No bleeding, bruising or rash   Lymph nodes:   No palpable adenopathy   Neurologic:   Cranial nerves 2 - 12 grossly intact, sensation intact, DTR       present and equal bilaterally       Results Review:   I reviewed the patient's new clinical results.    Lab Results (last 72 hours)     Procedure Component Value Units Date/Time    CBC (No Diff) [[id number]]  (Abnormal) Collected:  11/06/19 0756    Specimen:  Blood Updated:  11/06/19 0843     WBC 5.15 10*3/mm3      RBC 3.46 10*6/mm3      Hemoglobin 10.8 g/dL      Hematocrit 33.8 %      MCV 97.7 fL      MCH 31.2 pg      MCHC 32.0 g/dL      RDW 13.8 %      RDW-SD 49.2 fl      MPV 9.7 fL      Platelets 168 10*3/mm3     Basic Metabolic Panel [[id number]]  (Abnormal) Collected:  11/06/19 0756    Specimen:  Blood Updated:  11/06/19 0840     Glucose 127 mg/dL      BUN 13 mg/dL      Creatinine 1.61 mg/dL      Sodium 139 mmol/L      Potassium 4.2 mmol/L      Chloride 102 mmol/L      CO2 28.0 mmol/L      Calcium 8.1 mg/dL      eGFR Non African Amer 41 mL/min/1.73      BUN/Creatinine Ratio 8.1     Anion Gap 9.0 mmol/L     Narrative:       GFR Normal >60  Chronic Kidney Disease <60  Kidney Failure <15    [facility] [[id number]] Collected:  11/05/19 1229    Specimen:  Blood Updated:  11/05/19 1409    Narrative:       The following orders were created for panel order [facility].  Procedure                               Abnormality          Status                     ---------                               -----------         ------                     Light Blue Top[[id number]]                                   Final result               Green Top (Gel)[[id number]]                                  Final result               Lavender Top[[id number]]                                     Final result               Gold Top - SST[[id number]]                                   Final result               Green Top (No Gel)[[id number]]                                                            Please view results for these tests on the individual orders.    Urinalysis With Microscopic If Indicated (No Culture) - Urine, Clean Catch [[id number]]  (Abnormal) Collected:  11/05/19 1329    Specimen:  Urine, Clean Catch Updated:  11/05/19 1345     Color, UA Yellow     Appearance, UA Clear     pH, UA >=9.0     Specific Gravity, UA 1.016     Glucose, UA Negative     Ketones, UA Trace     Bilirubin, UA Negative     Blood, UA Negative     Protein, UA Negative     Leuk Esterase, UA Negative     Nitrite, UA Negative     Urobilinogen, UA 1.0 E.U./dL    Narrative:       Urine microscopic not indicated.    Light Blue Top [[id number]] Collected:  11/05/19 1229    Specimen:  Blood Updated:  11/05/19 1331     Extra Tube hold for add-on     Comment: Auto resulted       Green Top (Gel) [[id number]] Collected:  11/05/19 1229    Specimen:  Blood Updated:  11/05/19 1331     Extra Tube Hold for add-ons.     Comment: Auto resulted.       Gold Top - SST [[id number]] Collected:  11/05/19 1229    Specimen:  Blood Updated:  11/05/19 1331     Extra Tube Hold for add-ons.     Comment: Auto resulted.       Lavender Top [[id number]] Collected:  11/05/19 1229    Specimen:  Blood Updated:  11/05/19 1331     Extra Tube hold for add-on     Comment: Auto resulted       Comprehensive Metabolic Panel [[id number]]  (Abnormal) Collected:  11/05/19 1229    Specimen:  Blood Updated:  11/05/19 1306     Glucose 114  mg/dL      BUN 13 mg/dL      Creatinine 2.00 mg/dL      Sodium 138 mmol/L      Potassium 4.1 mmol/L      Chloride 98 mmol/L      CO2 29.0 mmol/L      Calcium 8.9 mg/dL      Total Protein 6.9 g/dL      Albumin 3.40 g/dL      ALT (SGPT) 18 U/L      AST (SGOT) 20 U/L      Alkaline Phosphatase 91 U/L      Total Bilirubin 0.5 mg/dL      eGFR Non African Amer 32 mL/min/1.73      Globulin 3.5 gm/dL      A/G Ratio 1.0 g/dL      BUN/Creatinine Ratio 6.5     Anion Gap 11.0 mmol/L     Narrative:       GFR Normal >60  Chronic Kidney Disease <60  Kidney Failure <15    Lipase [[id number]]  (Abnormal) Collected:  11/05/19 1229    Specimen:  Blood Updated:  11/05/19 1306     Lipase 9 U/L     Lactic Acid, Plasma [[id number]]  (Normal) Collected:  11/05/19 1230    Specimen:  Blood Updated:  11/05/19 1302     Lactate 1.4 mmol/L      Comment: Falsely depressed results may occur on samples drawn from patients receiving N-Acetylcysteine (NAC) or Metamizole.       CBC & Differential [[id number]] Collected:  11/05/19 1229    Specimen:  Blood Updated:  11/05/19 1247    Narrative:       The following orders were created for panel order CBC & Differential.  Procedure                               Abnormality         Status                     ---------                               -----------         ------                     CBC Auto Differential[[id number]]        Abnormal            Final result                 Please view results for these tests on the individual orders.    CBC Auto Differential [[id number]]  (Abnormal) Collected:  11/05/19 1229    Specimen:  Blood Updated:  11/05/19 1247     WBC 6.55 10*3/mm3      RBC 4.07 10*6/mm3      Hemoglobin 12.8 g/dL      Hematocrit 39.1 %      MCV 96.1 fL      MCH 31.4 pg      MCHC 32.7 g/dL      RDW 13.7 %      RDW-SD 48.2 fl      MPV 9.7 fL      Platelets 218 10*3/mm3      Neutrophil % 58.3 %      Lymphocyte % 26.9 %      Monocyte % 9.0 %      Eosinophil % 4.1 %      Basophil % 0.8 %      Immature  Grans % 0.9 %      Neutrophils, Absolute 3.82 10*3/mm3      Lymphocytes, Absolute 1.76 10*3/mm3      Monocytes, Absolute 0.59 10*3/mm3      Eosinophils, Absolute 0.27 10*3/mm3      Basophils, Absolute 0.05 10*3/mm3      Immature Grans, Absolute 0.06 10*3/mm3      nRBC 0.0 /100 WBC         Imaging Results (Last 24 Hours)     Procedure Component Value Units Date/Time    US Gallbladder [[id number]] Collected:  11/05/19 1440     Updated:  11/06/19 0908    Narrative:       EXAMINATION: US GALLBLADDER- 11/05/2019     INDICATION: RUQ pain     TECHNIQUE: Grayscale and color Doppler ultrasonographic imaging of the  right upper quadrant was performed.     COMPARISON: NONE     FINDINGS: Examination is somewhat limited secondary to patient body  habitus and bowel gas artifact. The pancreas is unremarkable as  visualized. Please note there is significant limitation of the pancreas.  The liver demonstrates diffusely increased echotexture suggestive of  hepatic steatosis which does somewhat limit evaluation for underlying  hepatic mass or lesion although none is visualized. No perihepatic fluid  identified.     Cholelithiasis with associated gallbladder sludge identified layering in  the gallbladder. No pericholecystic fluid or gallbladder wall edema  identified. No gallbladder wall thickening is appreciated. The common  bile duct measures 0.4 cm which is within normal limits of size.     The right kidney measures 9.8 x 5.8 x 5.5 cm. Loss of normal cortical  medullary differentiation is identified. Prominent renal pelvis is noted  without obvious hydronephrosis.       Impression:          Limited examination, although within limitations of the exam:     1. Cholelithiasis with gallbladder sludge. No evidence of  pericholecystic fluid or gallbladder wall thickening.     2. Hepatic steatosis suggested.     3. Loss of normal cortical medullary differentiation of the right kidney  with mildly prominent renal pelvis suggestive of medical  renal disease  in the appropriate clinical context. Correlate clinically.     D:  11/05/2019  E:  11/06/2019       CT Abdomen Pelvis Without Contrast [[id number]] Collected:  11/05/19 1512     Updated:  11/05/19 1518    Narrative:       EXAMINATION: CT ABDOMEN PELVIS WO CONTRAST-      INDICATION: Abdominal pain, unspecified     TECHNIQUE: Unenhanced CT imaging of the abdomen and pelvis was performed  with additional coronal and sagittal reformatted imaging provided for  review.     The radiation dose reduction device was turned on for each scan per the  ALARA (As Low as Reasonably Achievable) protocol.     COMPARISON: Right upper quadrant abdominal ultrasound 11/05/2019     FINDINGS: The visualized lower chest demonstrates bilateral pleural  calcifications and pleural thickening which could relate to prior  pleural intervention or asbestosis related pleural disease. Clinical  correlation is required.     No obvious hepatic mass identified, although lack of intravenous  contrast limits evaluation of the liver. The gallbladder does appear  distended with surrounding mild. Cholecystic inflammation and  gallbladder sludge/cholelithiasis suggested was seen on the gallbladder  ultrasound performed 11/05/2019. The spleen is unremarkable in  appearance. The adrenal glands are free of mass. The pancreas does not  demonstrate obvious abnormality or mass, although within the mid body of  the pancreas there is a focal 2.6 x 2.3 cm soft tissue region which  could relate to an underlying pancreatic mass, although lack of  intravenous contrast limits evaluation. The kidneys are atrophied  bilaterally without evidence for obstructive uropathy. Identified  involving the left kidney is a exophytic probably slightly hyperdense  renal cyst measuring up to 4.5 cm. There is an obstructing right mid  pole 7 mm ureteral calculus with resultant very mild right-sided  hydroureteronephrosis and right-sided renal inflammation.. Urinary  bladder  is unremarkable in appearance. The prostate gland is  heterogenous and calcified and slightly enlarged measuring up to 5.2 cm.  Extensive calcified atherosclerotic disease of the abdominal aorta and  its branches are identified with mild ectasia of the right iliac artery  measuring up to 2.1 cm. No free air free fluid identified. The stomach  is partially decompressed and otherwise unremarkable. The small bowel is  nondilated. No evidence of bowel structure appreciated. Colonic  diverticulosis is noted without convincing evidence for diverticulitis.  No free air identified. Surrounding soft tissues are unremarkable.  Thoracic lumbar degenerative changes are identified with posterior disc  osteophyte complexes and likely disc pathology noted at multiple levels.  Degenerative changes of the hips are identified. No acute fracture acute  osseous abnormality identified.       Impression:          Obstructing 7 mm right mid ureteral calculus with resultant very mild  right-sided hydroureteronephrosis and surrounding inflammation of the  ureter and kidney.     Distended gallbladder with pericholecystic inflammation.     Identified in the mid body of the pancreas is a 2.7 x 2.3 cm soft tissue  region/abnormality in which underlying pancreatic neoplasm is not  excluded and follow-up nonemergent CT or MRI of the abdomen with IV  contrast (pancreatic mass protocol) is advised for further evaluation if  clinically appropriate.     Additional likely chronic findings as discussed in the body of the  report.             Medications:  Current Facility-Administered Medications   Medication Dose Route Frequency Provider Last Rate Last Dose   • acetaminophen (TYLENOL) tablet 650 mg  650 mg Oral Q4H PRN Madai Torres II, DO        Or   • acetaminophen (TYLENOL) 160 MG/5ML solution 650 mg  650 mg Oral Q4H PRN Madai Torres II, DO        Or   • acetaminophen (TYLENOL) suppository 650 mg  650 mg Rectal Q4H PRN Madai Torres II,  DO       • amLODIPine (NORVASC) tablet 5 mg  5 mg Oral Daily MelissaMadai M II, DO   5 mg at 11/05/19 1833   • fluticasone (FLONASE) 50 MCG/ACT nasal spray 2 spray  2 spray Each Nare Daily Melissa, Madai M II, DO   2 spray at 11/05/19 2036   • gabapentin (NEURONTIN) capsule 200 mg  200 mg Oral Nightly Melissa, Madai M II, DO   200 mg at 11/05/19 2036   • HYDROmorphone (DILAUDID) injection 0.5 mg  0.5 mg Intravenous Q2H PRN Melissa, Madai M II, DO        And   • naloxone (NARCAN) injection 0.4 mg  0.4 mg Intravenous Q5 Min PRN Melissa, Madai M II, DO       • levothyroxine (SYNTHROID, LEVOTHROID) tablet 50 mcg  50 mcg Oral Q AM Melissa, Madai M II, DO   50 mcg at 11/06/19 0702   • magnesium hydroxide (MILK OF MAGNESIA) suspension 2400 mg/10mL 10 mL  10 mL Oral Daily PRN Jelly Marsh PA-C   10 mL at 11/05/19 2036   • Magnesium Sulfate 2 gram Bolus, followed by 8 gram infusion (total Mg dose 10 grams)- Mg less than or equal to 1mg/dL  2 g Intravenous PRN Melissa, Madai M II, DO        Or   • Magnesium Sulfate 2 gram / 50mL Infusion (GIVE X 3 BAGS TO EQUAL 6GM TOTAL DOSE) - Mg 1.1 - 1.5 mg/dl  2 g Intravenous PRN Melissa, Madai M II, DO        Or   • Magnesium Sulfate 4 gram infusion- Mg 1.6-1.9 mg/dL  4 g Intravenous PRN Melissa, Madai M II, DO       • ondansetron (ZOFRAN) tablet 4 mg  4 mg Oral Q6H PRN Melissa, Madai M II, DO        Or   • ondansetron (ZOFRAN) injection 4 mg  4 mg Intravenous Q6H PRN Melissa, Madai M II, DO       • oxyCODONE-acetaminophen (PERCOCET) 5-325 MG per tablet 1 tablet  1 tablet Oral Q4H PRN Melissa, Madai M II, DO   1 tablet at 11/06/19 0409   • potassium chloride (MICRO-K) CR capsule 40 mEq  40 mEq Oral PRN Melissa, Madai M II, DO        Or   • potassium chloride (KLOR-CON) packet 40 mEq  40 mEq Oral PRN Melissa, Madai M II, DO        Or   • potassium chloride 10 mEq in 100 mL IVPB  10 mEq Intravenous Q1H PRN Melissa, Madai M II, DO       • sennosides-docusate (PERICOLACE) 8.6-50 MG  per tablet 2 tablet  2 tablet Oral Nightly MelissaMadai M II, DO   2 tablet at 11/05/19 2036   • sodium chloride 0.9 % flush 10 mL  10 mL Intravenous PRN Liban Michelle MD       • sodium chloride 0.9 % flush 10 mL  10 mL Intravenous Q12H Melissa, Madai M II, DO       • sodium chloride 0.9 % flush 10 mL  10 mL Intravenous PRN Melissa Madai M II, DO       • sodium chloride 0.9 % infusion  125 mL/hr Intravenous Continuous Melissa, Madai M II,  mL/hr at 11/05/19 1833 125 mL/hr at 11/05/19 1833   • tamsulosin (FLOMAX) 24 hr capsule 0.4 mg  0.4 mg Oral Daily MelissaMadai M II, DO   0.4 mg at 11/05/19 2036       Assessment/Plan       Right ureteral stone    TAZ (acute kidney injury) (CMS/HCC)    Gallbladder disease    Abnormal CT scan    Obesity (BMI 30-39.9)      Impression: Right ureteral calculus    Plan: Ureteroscopy with laser lithotripsy.    I discussed the patients findings and my recommendations with patient and family    Britton Saba MD  11/06/19  9:27 AM

## 2019-11-07 PROBLEM — K86.89 PANCREATIC MASS: Status: ACTIVE | Noted: 2019-11-07

## 2019-11-07 LAB
ANION GAP SERPL CALCULATED.3IONS-SCNC: 9 MMOL/L (ref 5–15)
BASOPHILS # BLD AUTO: 0.02 10*3/MM3 (ref 0–0.2)
BASOPHILS NFR BLD AUTO: 0.3 % (ref 0–1.5)
BUN BLD-MCNC: 12 MG/DL (ref 8–23)
BUN/CREAT SERPL: 7.3 (ref 7–25)
CALCIUM SPEC-SCNC: 8.1 MG/DL (ref 8.6–10.5)
CHLORIDE SERPL-SCNC: 101 MMOL/L (ref 98–107)
CO2 SERPL-SCNC: 26 MMOL/L (ref 22–29)
CREAT BLD-MCNC: 1.65 MG/DL (ref 0.76–1.27)
DEPRECATED RDW RBC AUTO: 48.6 FL (ref 37–54)
EOSINOPHIL # BLD AUTO: 0.26 10*3/MM3 (ref 0–0.4)
EOSINOPHIL NFR BLD AUTO: 3.7 % (ref 0.3–6.2)
ERYTHROCYTE [DISTWIDTH] IN BLOOD BY AUTOMATED COUNT: 13.6 % (ref 12.3–15.4)
GFR SERPL CREATININE-BSD FRML MDRD: 40 ML/MIN/1.73
GLUCOSE BLD-MCNC: 97 MG/DL (ref 65–99)
HCT VFR BLD AUTO: 33.6 % (ref 37.5–51)
HGB BLD-MCNC: 10.7 G/DL (ref 13–17.7)
IMM GRANULOCYTES # BLD AUTO: 0.07 10*3/MM3 (ref 0–0.05)
IMM GRANULOCYTES NFR BLD AUTO: 1 % (ref 0–0.5)
LYMPHOCYTES # BLD AUTO: 1.34 10*3/MM3 (ref 0.7–3.1)
LYMPHOCYTES NFR BLD AUTO: 18.8 % (ref 19.6–45.3)
MCH RBC QN AUTO: 31.1 PG (ref 26.6–33)
MCHC RBC AUTO-ENTMCNC: 31.8 G/DL (ref 31.5–35.7)
MCV RBC AUTO: 97.7 FL (ref 79–97)
MONOCYTES # BLD AUTO: 0.51 10*3/MM3 (ref 0.1–0.9)
MONOCYTES NFR BLD AUTO: 7.2 % (ref 5–12)
NEUTROPHILS # BLD AUTO: 4.92 10*3/MM3 (ref 1.7–7)
NEUTROPHILS NFR BLD AUTO: 69 % (ref 42.7–76)
NRBC BLD AUTO-RTO: 0 /100 WBC (ref 0–0.2)
PLATELET # BLD AUTO: 168 10*3/MM3 (ref 140–450)
PMV BLD AUTO: 9.9 FL (ref 6–12)
POTASSIUM BLD-SCNC: 4.6 MMOL/L (ref 3.5–5.2)
RBC # BLD AUTO: 3.44 10*6/MM3 (ref 4.14–5.8)
SODIUM BLD-SCNC: 136 MMOL/L (ref 136–145)
WBC NRBC COR # BLD: 7.12 10*3/MM3 (ref 3.4–10.8)

## 2019-11-07 PROCEDURE — 80048 BASIC METABOLIC PNL TOTAL CA: CPT | Performed by: INTERNAL MEDICINE

## 2019-11-07 PROCEDURE — 85025 COMPLETE CBC W/AUTO DIFF WBC: CPT | Performed by: INTERNAL MEDICINE

## 2019-11-07 PROCEDURE — 25010000002 HYDROMORPHONE PER 4 MG: Performed by: INTERNAL MEDICINE

## 2019-11-07 PROCEDURE — 99233 SBSQ HOSP IP/OBS HIGH 50: CPT | Performed by: HOSPITALIST

## 2019-11-07 RX ADMIN — POLYETHYLENE GLYCOL 3350 17 G: 17 POWDER, FOR SOLUTION ORAL at 14:38

## 2019-11-07 RX ADMIN — AMLODIPINE BESYLATE 5 MG: 10 TABLET ORAL at 08:11

## 2019-11-07 RX ADMIN — LEVOTHYROXINE SODIUM 50 MCG: 50 TABLET ORAL at 05:16

## 2019-11-07 RX ADMIN — SODIUM CHLORIDE 125 ML/HR: 9 INJECTION, SOLUTION INTRAVENOUS at 08:14

## 2019-11-07 RX ADMIN — SODIUM CHLORIDE, PRESERVATIVE FREE 10 ML: 5 INJECTION INTRAVENOUS at 20:22

## 2019-11-07 RX ADMIN — OXYCODONE HYDROCHLORIDE AND ACETAMINOPHEN 1 TABLET: 5; 325 TABLET ORAL at 14:38

## 2019-11-07 RX ADMIN — SENNOSIDES AND DOCUSATE SODIUM 2 TABLET: 8.6; 5 TABLET ORAL at 20:22

## 2019-11-07 RX ADMIN — SODIUM CHLORIDE 125 ML/HR: 9 INJECTION, SOLUTION INTRAVENOUS at 00:29

## 2019-11-07 RX ADMIN — TAMSULOSIN HYDROCHLORIDE 0.4 MG: 0.4 CAPSULE ORAL at 08:11

## 2019-11-07 RX ADMIN — HYDROMORPHONE HYDROCHLORIDE 0.5 MG: 1 INJECTION, SOLUTION INTRAMUSCULAR; INTRAVENOUS; SUBCUTANEOUS at 07:23

## 2019-11-07 RX ADMIN — GABAPENTIN 200 MG: 100 CAPSULE ORAL at 20:22

## 2019-11-07 RX ADMIN — FLUTICASONE PROPIONATE 2 SPRAY: 50 SPRAY, METERED NASAL at 08:11

## 2019-11-07 RX ADMIN — HYDROMORPHONE HYDROCHLORIDE 0.5 MG: 1 INJECTION, SOLUTION INTRAMUSCULAR; INTRAVENOUS; SUBCUTANEOUS at 05:16

## 2019-11-07 NOTE — ANESTHESIA PROCEDURE NOTES
Airway  Date/Time: 11/6/2019 7:24 PM  Airway not difficult    General Information and Staff    Patient location during procedure: OR  Anesthesiologist: Sanaz Shepherd MD    Indications and Patient Condition  Indications for airway management: airway protection    Preoxygenated: yes  Mask difficulty assessment: 0 - not attempted    Final Airway Details  Final airway type: supraglottic airway      Successful airway: I-gel  Size 4    Number of attempts at approach: 1  Assessment: lips, teeth, and gum same as pre-op and atraumatic intubation

## 2019-11-07 NOTE — ANESTHESIA POSTPROCEDURE EVALUATION
Patient: Miguel Angel Crane    Procedure Summary     Date:  11/06/19 Room / Location:   [facility] /  [facility]    Anesthesia Start:  1916 Anesthesia Stop:  2014    Procedure:  CYSTOSCOPY RIGHT URETERSCOPY RIGHT URETERAL STENT PLACEMENT (Right ) Diagnosis:      Surgeon:  Britton Saba MD Provider:  Sanaz Shepherd MD    Anesthesia Type:  general ASA Status:  4          Anesthesia Type: general  Last vitals  BP   136/73 (11/06/19 1610)   Temp   97.9 °F (36.6 °C) (11/06/19 1610)   Pulse   82 (11/06/19 1610)   Resp   18 (11/06/19 1610)     SpO2   96 % (11/06/19 1610)     Post Anesthesia Care and Evaluation    Patient location during evaluation: PACU  Patient participation: complete - patient participated  Level of consciousness: awake  Pain score: 0  Pain management: adequate  Airway patency: patent  Anesthetic complications: No anesthetic complications    Cardiovascular status: acceptable and hemodynamically stable  Respiratory status: acceptable, nasal cannula, nonlabored ventilation and spontaneous ventilation  Hydration status: acceptable    Comments: No apparent anesthesia complications at this time

## 2019-11-08 LAB
ANION GAP SERPL CALCULATED.3IONS-SCNC: 9 MMOL/L (ref 5–15)
BUN BLD-MCNC: 9 MG/DL (ref 8–23)
BUN/CREAT SERPL: 6.9 (ref 7–25)
CALCIUM SPEC-SCNC: 8.2 MG/DL (ref 8.6–10.5)
CHLORIDE SERPL-SCNC: 100 MMOL/L (ref 98–107)
CO2 SERPL-SCNC: 27 MMOL/L (ref 22–29)
CREAT BLD-MCNC: 1.3 MG/DL (ref 0.76–1.27)
DEPRECATED RDW RBC AUTO: 50 FL (ref 37–54)
ERYTHROCYTE [DISTWIDTH] IN BLOOD BY AUTOMATED COUNT: 13.9 % (ref 12.3–15.4)
GFR SERPL CREATININE-BSD FRML MDRD: 52 ML/MIN/1.73
GLUCOSE BLD-MCNC: 96 MG/DL (ref 65–99)
HCT VFR BLD AUTO: 34.8 % (ref 37.5–51)
HGB BLD-MCNC: 11 G/DL (ref 13–17.7)
MCH RBC QN AUTO: 31 PG (ref 26.6–33)
MCHC RBC AUTO-ENTMCNC: 31.6 G/DL (ref 31.5–35.7)
MCV RBC AUTO: 98 FL (ref 79–97)
PLATELET # BLD AUTO: 163 10*3/MM3 (ref 140–450)
PMV BLD AUTO: 9.7 FL (ref 6–12)
POTASSIUM BLD-SCNC: 4.2 MMOL/L (ref 3.5–5.2)
RBC # BLD AUTO: 3.55 10*6/MM3 (ref 4.14–5.8)
SODIUM BLD-SCNC: 136 MMOL/L (ref 136–145)
WBC NRBC COR # BLD: 6.18 10*3/MM3 (ref 3.4–10.8)

## 2019-11-08 PROCEDURE — 25010000002 HYDROMORPHONE PER 4 MG: Performed by: INTERNAL MEDICINE

## 2019-11-08 PROCEDURE — 85027 COMPLETE CBC AUTOMATED: CPT | Performed by: HOSPITALIST

## 2019-11-08 PROCEDURE — 97161 PT EVAL LOW COMPLEX 20 MIN: CPT

## 2019-11-08 PROCEDURE — 99233 SBSQ HOSP IP/OBS HIGH 50: CPT | Performed by: HOSPITALIST

## 2019-11-08 PROCEDURE — 80048 BASIC METABOLIC PNL TOTAL CA: CPT | Performed by: HOSPITALIST

## 2019-11-08 PROCEDURE — 97166 OT EVAL MOD COMPLEX 45 MIN: CPT

## 2019-11-08 RX ORDER — BISACODYL 10 MG
10 SUPPOSITORY, RECTAL RECTAL DAILY PRN
Status: DISCONTINUED | OUTPATIENT
Start: 2019-11-08 | End: 2019-11-11 | Stop reason: HOSPADM

## 2019-11-08 RX ADMIN — SENNOSIDES AND DOCUSATE SODIUM 2 TABLET: 8.6; 5 TABLET ORAL at 20:48

## 2019-11-08 RX ADMIN — FLUTICASONE PROPIONATE 2 SPRAY: 50 SPRAY, METERED NASAL at 09:32

## 2019-11-08 RX ADMIN — BISACODYL 10 MG: 10 SUPPOSITORY RECTAL at 10:21

## 2019-11-08 RX ADMIN — TAMSULOSIN HYDROCHLORIDE 0.4 MG: 0.4 CAPSULE ORAL at 09:32

## 2019-11-08 RX ADMIN — OXYCODONE HYDROCHLORIDE AND ACETAMINOPHEN 1 TABLET: 5; 325 TABLET ORAL at 11:00

## 2019-11-08 RX ADMIN — HYDROMORPHONE HYDROCHLORIDE 0.5 MG: 1 INJECTION, SOLUTION INTRAMUSCULAR; INTRAVENOUS; SUBCUTANEOUS at 20:56

## 2019-11-08 RX ADMIN — HYDROMORPHONE HYDROCHLORIDE 0.5 MG: 1 INJECTION, SOLUTION INTRAMUSCULAR; INTRAVENOUS; SUBCUTANEOUS at 06:56

## 2019-11-08 RX ADMIN — MAGNESIUM HYDROXIDE 10 ML: 2400 SUSPENSION ORAL at 10:21

## 2019-11-08 RX ADMIN — GABAPENTIN 200 MG: 100 CAPSULE ORAL at 20:48

## 2019-11-08 RX ADMIN — AMLODIPINE BESYLATE 5 MG: 10 TABLET ORAL at 09:32

## 2019-11-08 RX ADMIN — SODIUM CHLORIDE 75 ML/HR: 9 INJECTION, SOLUTION INTRAVENOUS at 09:33

## 2019-11-08 RX ADMIN — OXYCODONE HYDROCHLORIDE AND ACETAMINOPHEN 1 TABLET: 5; 325 TABLET ORAL at 01:02

## 2019-11-08 RX ADMIN — LEVOTHYROXINE SODIUM 50 MCG: 50 TABLET ORAL at 05:55

## 2019-11-08 RX ADMIN — SODIUM CHLORIDE, PRESERVATIVE FREE 10 ML: 5 INJECTION INTRAVENOUS at 20:48

## 2019-11-09 ENCOUNTER — APPOINTMENT (OUTPATIENT)
Dept: GENERAL RADIOLOGY | Facility: HOSPITAL | Age: 84
End: 2019-11-09

## 2019-11-09 LAB
ANION GAP SERPL CALCULATED.3IONS-SCNC: 10 MMOL/L (ref 5–15)
BUN BLD-MCNC: 8 MG/DL (ref 8–23)
BUN/CREAT SERPL: 6.4 (ref 7–25)
CALCIUM SPEC-SCNC: 8.5 MG/DL (ref 8.6–10.5)
CHLORIDE SERPL-SCNC: 101 MMOL/L (ref 98–107)
CO2 SERPL-SCNC: 28 MMOL/L (ref 22–29)
CREAT BLD-MCNC: 1.25 MG/DL (ref 0.76–1.27)
DEPRECATED RDW RBC AUTO: 50.5 FL (ref 37–54)
ERYTHROCYTE [DISTWIDTH] IN BLOOD BY AUTOMATED COUNT: 13.9 % (ref 12.3–15.4)
GFR SERPL CREATININE-BSD FRML MDRD: 55 ML/MIN/1.73
GLUCOSE BLD-MCNC: 136 MG/DL (ref 65–99)
HCT VFR BLD AUTO: 34 % (ref 37.5–51)
HGB BLD-MCNC: 10.9 G/DL (ref 13–17.7)
MCH RBC QN AUTO: 31.6 PG (ref 26.6–33)
MCHC RBC AUTO-ENTMCNC: 32.1 G/DL (ref 31.5–35.7)
MCV RBC AUTO: 98.6 FL (ref 79–97)
PLATELET # BLD AUTO: 149 10*3/MM3 (ref 140–450)
PMV BLD AUTO: 9.4 FL (ref 6–12)
POTASSIUM BLD-SCNC: 4.1 MMOL/L (ref 3.5–5.2)
RBC # BLD AUTO: 3.45 10*6/MM3 (ref 4.14–5.8)
SODIUM BLD-SCNC: 139 MMOL/L (ref 136–145)
WBC NRBC COR # BLD: 4.55 10*3/MM3 (ref 3.4–10.8)

## 2019-11-09 PROCEDURE — 99233 SBSQ HOSP IP/OBS HIGH 50: CPT | Performed by: HOSPITALIST

## 2019-11-09 PROCEDURE — 71045 X-RAY EXAM CHEST 1 VIEW: CPT

## 2019-11-09 PROCEDURE — 25010000002 ONDANSETRON PER 1 MG: Performed by: INTERNAL MEDICINE

## 2019-11-09 PROCEDURE — 80048 BASIC METABOLIC PNL TOTAL CA: CPT | Performed by: HOSPITALIST

## 2019-11-09 PROCEDURE — 25010000002 HYDROMORPHONE PER 4 MG: Performed by: INTERNAL MEDICINE

## 2019-11-09 PROCEDURE — 85027 COMPLETE CBC AUTOMATED: CPT | Performed by: HOSPITALIST

## 2019-11-09 RX ADMIN — FLUTICASONE PROPIONATE 2 SPRAY: 50 SPRAY, METERED NASAL at 09:07

## 2019-11-09 RX ADMIN — SODIUM CHLORIDE, PRESERVATIVE FREE 10 ML: 5 INJECTION INTRAVENOUS at 21:01

## 2019-11-09 RX ADMIN — SODIUM CHLORIDE, PRESERVATIVE FREE 10 ML: 5 INJECTION INTRAVENOUS at 09:07

## 2019-11-09 RX ADMIN — GABAPENTIN 200 MG: 100 CAPSULE ORAL at 21:00

## 2019-11-09 RX ADMIN — OXYCODONE HYDROCHLORIDE AND ACETAMINOPHEN 1 TABLET: 5; 325 TABLET ORAL at 23:54

## 2019-11-09 RX ADMIN — OXYCODONE HYDROCHLORIDE AND ACETAMINOPHEN 1 TABLET: 5; 325 TABLET ORAL at 09:07

## 2019-11-09 RX ADMIN — SENNOSIDES AND DOCUSATE SODIUM 2 TABLET: 8.6; 5 TABLET ORAL at 21:00

## 2019-11-09 RX ADMIN — HYDROMORPHONE HYDROCHLORIDE 0.5 MG: 1 INJECTION, SOLUTION INTRAMUSCULAR; INTRAVENOUS; SUBCUTANEOUS at 06:36

## 2019-11-09 RX ADMIN — ONDANSETRON 4 MG: 2 INJECTION INTRAMUSCULAR; INTRAVENOUS at 22:27

## 2019-11-09 RX ADMIN — TAMSULOSIN HYDROCHLORIDE 0.4 MG: 0.4 CAPSULE ORAL at 09:07

## 2019-11-09 RX ADMIN — LEVOTHYROXINE SODIUM 50 MCG: 50 TABLET ORAL at 06:19

## 2019-11-09 RX ADMIN — AMLODIPINE BESYLATE 5 MG: 10 TABLET ORAL at 09:07

## 2019-11-10 LAB
ANION GAP SERPL CALCULATED.3IONS-SCNC: 9 MMOL/L (ref 5–15)
BUN BLD-MCNC: 9 MG/DL (ref 8–23)
BUN/CREAT SERPL: 7 (ref 7–25)
CALCIUM SPEC-SCNC: 8.5 MG/DL (ref 8.6–10.5)
CHLORIDE SERPL-SCNC: 99 MMOL/L (ref 98–107)
CO2 SERPL-SCNC: 31 MMOL/L (ref 22–29)
CREAT BLD-MCNC: 1.28 MG/DL (ref 0.76–1.27)
DEPRECATED RDW RBC AUTO: 49.2 FL (ref 37–54)
ERYTHROCYTE [DISTWIDTH] IN BLOOD BY AUTOMATED COUNT: 13.8 % (ref 12.3–15.4)
GFR SERPL CREATININE-BSD FRML MDRD: 53 ML/MIN/1.73
GLUCOSE BLD-MCNC: 124 MG/DL (ref 65–99)
HCT VFR BLD AUTO: 33.8 % (ref 37.5–51)
HGB BLD-MCNC: 10.7 G/DL (ref 13–17.7)
MCH RBC QN AUTO: 31.1 PG (ref 26.6–33)
MCHC RBC AUTO-ENTMCNC: 31.7 G/DL (ref 31.5–35.7)
MCV RBC AUTO: 98.3 FL (ref 79–97)
PLATELET # BLD AUTO: 151 10*3/MM3 (ref 140–450)
PMV BLD AUTO: 9.7 FL (ref 6–12)
POTASSIUM BLD-SCNC: 4 MMOL/L (ref 3.5–5.2)
RBC # BLD AUTO: 3.44 10*6/MM3 (ref 4.14–5.8)
SODIUM BLD-SCNC: 139 MMOL/L (ref 136–145)
WBC NRBC COR # BLD: 4.75 10*3/MM3 (ref 3.4–10.8)

## 2019-11-10 PROCEDURE — 80048 BASIC METABOLIC PNL TOTAL CA: CPT | Performed by: HOSPITALIST

## 2019-11-10 PROCEDURE — 85027 COMPLETE CBC AUTOMATED: CPT | Performed by: HOSPITALIST

## 2019-11-10 PROCEDURE — 99233 SBSQ HOSP IP/OBS HIGH 50: CPT | Performed by: HOSPITALIST

## 2019-11-10 RX ADMIN — SODIUM CHLORIDE, PRESERVATIVE FREE 10 ML: 5 INJECTION INTRAVENOUS at 09:09

## 2019-11-10 RX ADMIN — OXYCODONE HYDROCHLORIDE AND ACETAMINOPHEN 1 TABLET: 5; 325 TABLET ORAL at 12:49

## 2019-11-10 RX ADMIN — FLUTICASONE PROPIONATE 2 SPRAY: 50 SPRAY, METERED NASAL at 09:09

## 2019-11-10 RX ADMIN — SENNOSIDES AND DOCUSATE SODIUM 2 TABLET: 8.6; 5 TABLET ORAL at 20:47

## 2019-11-10 RX ADMIN — LEVOTHYROXINE SODIUM 50 MCG: 50 TABLET ORAL at 05:22

## 2019-11-10 RX ADMIN — AMLODIPINE BESYLATE 5 MG: 10 TABLET ORAL at 09:08

## 2019-11-10 RX ADMIN — TAMSULOSIN HYDROCHLORIDE 0.4 MG: 0.4 CAPSULE ORAL at 09:09

## 2019-11-10 RX ADMIN — GABAPENTIN 200 MG: 100 CAPSULE ORAL at 20:47

## 2019-11-10 RX ADMIN — POLYETHYLENE GLYCOL 3350 17 G: 17 POWDER, FOR SOLUTION ORAL at 09:09

## 2019-11-11 VITALS
DIASTOLIC BLOOD PRESSURE: 66 MMHG | HEART RATE: 93 BPM | SYSTOLIC BLOOD PRESSURE: 98 MMHG | OXYGEN SATURATION: 92 % | WEIGHT: 240 LBS | TEMPERATURE: 97.8 F | HEIGHT: 71 IN | BODY MASS INDEX: 33.6 KG/M2 | RESPIRATION RATE: 18 BRPM

## 2019-11-11 PROBLEM — N17.9 AKI (ACUTE KIDNEY INJURY) (HCC): Status: RESOLVED | Noted: 2019-11-05 | Resolved: 2019-11-11

## 2019-11-11 PROBLEM — N20.1 RIGHT URETERAL STONE: Status: RESOLVED | Noted: 2019-11-05 | Resolved: 2019-11-11

## 2019-11-11 PROBLEM — R93.89 ABNORMAL CT SCAN: Status: RESOLVED | Noted: 2019-11-05 | Resolved: 2019-11-11

## 2019-11-11 LAB
ANION GAP SERPL CALCULATED.3IONS-SCNC: 8 MMOL/L (ref 5–15)
BACTERIA UR QL AUTO: ABNORMAL /HPF
BILIRUB UR QL STRIP: NEGATIVE
BUN BLD-MCNC: 9 MG/DL (ref 8–23)
BUN/CREAT SERPL: 7.1 (ref 7–25)
CALCIUM SPEC-SCNC: 8.5 MG/DL (ref 8.6–10.5)
CHLORIDE SERPL-SCNC: 99 MMOL/L (ref 98–107)
CLARITY UR: ABNORMAL
CO2 SERPL-SCNC: 32 MMOL/L (ref 22–29)
COLOR UR: ABNORMAL
CREAT BLD-MCNC: 1.27 MG/DL (ref 0.76–1.27)
DEPRECATED RDW RBC AUTO: 50.2 FL (ref 37–54)
ERYTHROCYTE [DISTWIDTH] IN BLOOD BY AUTOMATED COUNT: 13.9 % (ref 12.3–15.4)
GFR SERPL CREATININE-BSD FRML MDRD: 54 ML/MIN/1.73
GLUCOSE BLD-MCNC: 112 MG/DL (ref 65–99)
GLUCOSE UR STRIP-MCNC: NEGATIVE MG/DL
HCT VFR BLD AUTO: 34.4 % (ref 37.5–51)
HGB BLD-MCNC: 11.1 G/DL (ref 13–17.7)
HGB UR QL STRIP.AUTO: ABNORMAL
HYALINE CASTS UR QL AUTO: ABNORMAL /LPF
KETONES UR QL STRIP: NEGATIVE
LEUKOCYTE ESTERASE UR QL STRIP.AUTO: NEGATIVE
MCH RBC QN AUTO: 31.6 PG (ref 26.6–33)
MCHC RBC AUTO-ENTMCNC: 32.3 G/DL (ref 31.5–35.7)
MCV RBC AUTO: 98 FL (ref 79–97)
NITRITE UR QL STRIP: NEGATIVE
PH UR STRIP.AUTO: 8 [PH] (ref 5–8)
PLATELET # BLD AUTO: 153 10*3/MM3 (ref 140–450)
PMV BLD AUTO: 9.9 FL (ref 6–12)
POTASSIUM BLD-SCNC: 4.2 MMOL/L (ref 3.5–5.2)
PROT UR QL STRIP: ABNORMAL
RBC # BLD AUTO: 3.51 10*6/MM3 (ref 4.14–5.8)
RBC # UR: ABNORMAL /HPF
REF LAB TEST METHOD: ABNORMAL
SODIUM BLD-SCNC: 139 MMOL/L (ref 136–145)
SP GR UR STRIP: 1.01 (ref 1–1.03)
SQUAMOUS #/AREA URNS HPF: ABNORMAL /HPF
UROBILINOGEN UR QL STRIP: ABNORMAL
WBC NRBC COR # BLD: 5.01 10*3/MM3 (ref 3.4–10.8)
WBC UR QL AUTO: ABNORMAL /HPF

## 2019-11-11 PROCEDURE — 97110 THERAPEUTIC EXERCISES: CPT

## 2019-11-11 PROCEDURE — 97116 GAIT TRAINING THERAPY: CPT

## 2019-11-11 PROCEDURE — 85027 COMPLETE CBC AUTOMATED: CPT | Performed by: HOSPITALIST

## 2019-11-11 PROCEDURE — 81001 URINALYSIS AUTO W/SCOPE: CPT | Performed by: HOSPITALIST

## 2019-11-11 PROCEDURE — 80048 BASIC METABOLIC PNL TOTAL CA: CPT | Performed by: HOSPITALIST

## 2019-11-11 PROCEDURE — 97530 THERAPEUTIC ACTIVITIES: CPT

## 2019-11-11 PROCEDURE — 99239 HOSP IP/OBS DSCHRG MGMT >30: CPT | Performed by: HOSPITALIST

## 2019-11-11 RX ADMIN — POLYETHYLENE GLYCOL 3350 17 G: 17 POWDER, FOR SOLUTION ORAL at 10:57

## 2019-11-11 RX ADMIN — TAMSULOSIN HYDROCHLORIDE 0.4 MG: 0.4 CAPSULE ORAL at 10:57

## 2019-11-11 RX ADMIN — LEVOTHYROXINE SODIUM 50 MCG: 50 TABLET ORAL at 05:38

## 2019-11-11 RX ADMIN — AMLODIPINE BESYLATE 5 MG: 10 TABLET ORAL at 10:58

## 2019-11-11 RX ADMIN — SODIUM CHLORIDE, PRESERVATIVE FREE 10 ML: 5 INJECTION INTRAVENOUS at 11:01

## 2019-11-11 RX ADMIN — FLUTICASONE PROPIONATE 2 SPRAY: 50 SPRAY, METERED NASAL at 10:57

## 2019-11-12 ENCOUNTER — READMISSION MANAGEMENT (OUTPATIENT)
Dept: CALL CENTER | Facility: HOSPITAL | Age: 84
End: 2019-11-12

## 2019-11-12 NOTE — OUTREACH NOTE
Prep Survey      Responses   Facility patient discharged from?  [facility]   Is patient eligible?  Yes   Discharge diagnosis  Obstructing right sided nephrolithiasis,    Hematuria,    Abnormal pancreas on CT (Pancreatic mass)   Does the patient have one of the following disease processes/diagnoses(primary or secondary)?  Other   Does the patient have Home health ordered?  No   Is there a DME ordered?  No   Prep survey completed?  Yes          Jennifer Wheat RN

## 2019-11-13 ENCOUNTER — OFFICE VISIT (OUTPATIENT)
Dept: GASTROENTEROLOGY | Facility: CLINIC | Age: 84
End: 2019-11-13

## 2019-11-13 VITALS
HEIGHT: 71 IN | BODY MASS INDEX: 33.6 KG/M2 | TEMPERATURE: 97.5 F | HEART RATE: 91 BPM | WEIGHT: 240 LBS | DIASTOLIC BLOOD PRESSURE: 82 MMHG | SYSTOLIC BLOOD PRESSURE: 130 MMHG | OXYGEN SATURATION: 94 %

## 2019-11-13 DIAGNOSIS — K86.89 MASS OF PANCREAS: Primary | ICD-10-CM

## 2019-11-13 PROCEDURE — 99204 OFFICE O/P NEW MOD 45 MIN: CPT | Performed by: INTERNAL MEDICINE

## 2019-11-13 NOTE — PROGRESS NOTES
PCP: Rocio Givens MD    Chief Complaint   Patient presents with   • Follow-up     HOSP (pancreas mass)       History of Present Illness:   HPI  Mr. Crane is a 87-year-old with a history of hypertension, coronary disease and stroke with right side weakness.  The patient was admitted to the hospital recently with right flank pain and was found to have a kidney stone.  He is scheduled for  lithotripsy in the next day or so on [facility].  The patient was also found to have a mass in the body of the pancreas.  Mr. Crane denies any upper abdominal pain with meals.  There is no history of nausea.  Mr. Crane denies any unexplained weight loss.  There is no history of night sweats, fever or chills.  The patient has no history of long-standing alcohol use.  There is no family history of pancreatic cancer.  Mr. Crane had an evaluation of the pancreas about 11 years ago at the [facility] with endoscopic ultrasound.  He also had a CT scan performed at that time.  Past Medical History:   Diagnosis Date   • Arthritis    • BPH (benign prostatic hyperplasia)    • Cataracts, both eyes    • Chronic constipation    • Coronary artery disease    • Disease of thyroid gland    • Hypertension    • Kidney stones    • Left carotid stenosis    • Low back pain    • Mass of pancreas    • Myocardial infarct (CMS/HCC) 1993   • Stroke (CMS/HCC)     left MCA, secondary to left carotid stenosis       Past Surgical History:   Procedure Laterality Date   • CAROTID STENT Left 02/03/2016    sx with prior CVA   • CATARACT EXTRACTION W/ INTRAOCULAR LENS  IMPLANT, BILATERAL Bilateral    • CYSTOSCOPY URETEROSCOPY Right 11/6/2019    Procedure: CYSTOSCOPY RIGHT URETERSCOPY RIGHT URETERAL STENT PLACEMENT;  Surgeon: Britton Saba MD;  Location:  [facility];  Service: Urology   • LUMBAR LAMINECTOMY DISCECTOMY DECOMPRESSION N/A 10/14/2016    Procedure: LUMBAR LAMINECTOMY  L3-L4;  Surgeon: George Richmond MD;  Location:  [facility];   Service:    • TONSILLECTOMY           Current Outpatient Medications:   •  amLODIPine (NORVASC) 5 MG tablet, TAKE 1 TABLET BY MOUTH ONCE DAILY, Disp: 90 tablet, Rfl: 0  •  fluticasone (VERAMYST) 27.5 MCG/SPRAY nasal spray, 2 sprays into each nostril daily., Disp: , Rfl:   •  gabapentin (NEURONTIN) 100 MG capsule, Take 200 mg by mouth Every Night., Disp: , Rfl:   •  levothyroxine (SYNTHROID, LEVOTHROID) 50 MCG tablet, Take 50 mcg by mouth daily., Disp: , Rfl:   •  magnesium hydroxide (MILK OF MAGNESIA) 400 MG/5ML suspension, Take 15 mL by mouth Daily As Needed for Constipation., Disp: , Rfl:   •  Multiple Vitamins-Minerals (PRESERVISION AREDS 2 PO), Take 1 tablet by mouth 2 (Two) Times a Day., Disp: , Rfl:   •  tamsulosin (FLOMAX) 0.4 MG capsule 24 hr capsule, Take 1 capsule by mouth 2 (two) times a day., Disp: , Rfl:     No Known Allergies    Family History   Problem Relation Age of Onset   • Heart disease Mother    • Heart disease Father        Social History     Socioeconomic History   • Marital status:      Spouse name: Not on file   • Number of children: Not on file   • Years of education: Not on file   • Highest education level: Not on file   Tobacco Use   • Smoking status: Former Smoker     Packs/day: 0.50     Years: 42.00     Pack years: 21.00     Last attempt to quit:      Years since quittin.8   • Smokeless tobacco: Never Used   • Tobacco comment: quit 24 years ago   Substance and Sexual Activity   • Alcohol use: No     Comment: 1988   • Drug use: No   • Sexual activity: Defer       Review of Systems   Constitutional: Negative for activity change, appetite change, fatigue, fever and unexpected weight change.   HENT: Negative for dental problem, hearing loss, mouth sores, postnasal drip, sneezing, trouble swallowing and voice change.    Eyes: Negative for pain, redness, itching and visual disturbance.   Respiratory: Negative for cough, choking, chest tightness, shortness of breath and  wheezing.    Cardiovascular: Negative for chest pain, palpitations and leg swelling.   Gastrointestinal: Positive for abdominal pain. Negative for abdominal distention, anal bleeding, blood in stool, constipation, diarrhea, nausea, rectal pain and vomiting.   Endocrine: Negative for cold intolerance, heat intolerance, polydipsia, polyphagia and polyuria.   Genitourinary: Negative.  Negative for dysuria, enuresis, flank pain, hematuria and urgency.        Burning when urinating, incontinence     Musculoskeletal: Negative for arthralgias, back pain, gait problem, joint swelling and myalgias.   Skin: Negative for color change, pallor and rash.   Allergic/Immunologic: Negative for environmental allergies, food allergies and immunocompromised state.   Neurological: Negative for dizziness, tremors, seizures, facial asymmetry, speech difficulty, numbness and headaches.   Hematological: Negative for adenopathy.   Psychiatric/Behavioral: Negative for behavioral problems, confusion, dysphoric mood, hallucinations and self-injury.       Vitals:    11/13/19 1457   BP: 130/82   Pulse: 91   Temp: 97.5 °F (36.4 °C)   SpO2: 94%       Physical Exam   Constitutional: He is oriented to person, place, and time. He appears well-nourished.   Patient in wheelchair   HENT:   Head: Atraumatic.   Mouth/Throat: Oropharynx is clear and moist. No oropharyngeal exudate.   Eyes: EOM are normal. No scleral icterus.   Neck: Neck supple. No thyromegaly present.   Cardiovascular: Normal rate, regular rhythm and normal heart sounds. Exam reveals no gallop.   No murmur heard.  Pulmonary/Chest: Effort normal and breath sounds normal. He has no wheezes. He has no rales.   Abdominal: Soft. Bowel sounds are normal. There is no tenderness. There is no rebound and no guarding.   Musculoskeletal: He exhibits no edema or tenderness.   Decreased range of motion on right due to stroke   Lymphadenopathy:     He has no cervical adenopathy.   Neurological: He is  alert and oriented to person, place, and time. A sensory deficit (right arm and hand) is present.   Skin: Skin is dry. No erythema.   Psychiatric: He has a normal mood and affect. His behavior is normal. Thought content normal.   Vitals reviewed.      Miguel Angel was seen today for follow-up.    Diagnoses and all orders for this visit:    Mass of pancreas    The patient had a recent CT scan without contrast that demonstrated a mass in the body the pancreas.   The CT scan in April 2008 with contrast demonstrated a mass in the body of the pancreas that was the same size as the current lesion.  The overall finding would be most consistent with a benign pancreatic process.  Reviewed records from the [facility].  An endoscopic ultrasound performed in 2008 by Dr. Loyd did not demonstrate evidence for pancreatic pathology.  The overall clinical scenario would be most consistent with a benign pancreatic process.      Plan: Discussed in detail the findings on the previous scan and ultrasound with the patient and his family.  My recommendation is not to pursue any further invasive GI work-up.  Mr. Crane and his family are in agreement.  The patient has other significant comorbidities.

## 2019-11-15 ENCOUNTER — ANESTHESIA (OUTPATIENT)
Dept: PERIOP | Facility: HOSPITAL | Age: 84
End: 2019-11-15

## 2019-11-15 ENCOUNTER — READMISSION MANAGEMENT (OUTPATIENT)
Dept: CALL CENTER | Facility: HOSPITAL | Age: 84
End: 2019-11-15

## 2019-11-15 ENCOUNTER — HOSPITAL ENCOUNTER (OUTPATIENT)
Facility: HOSPITAL | Age: 84
Setting detail: HOSPITAL OUTPATIENT SURGERY
Discharge: HOME | End: 2019-11-15
Attending: UROLOGY | Admitting: UROLOGY

## 2019-11-15 ENCOUNTER — ANESTHESIA EVENT (OUTPATIENT)
Dept: PERIOP | Facility: HOSPITAL | Age: 84
End: 2019-11-15

## 2019-11-15 VITALS
RESPIRATION RATE: 15 BRPM | WEIGHT: 240 LBS | DIASTOLIC BLOOD PRESSURE: 77 MMHG | SYSTOLIC BLOOD PRESSURE: 139 MMHG | TEMPERATURE: 97 F | BODY MASS INDEX: 33.6 KG/M2 | OXYGEN SATURATION: 95 % | HEIGHT: 71 IN | HEART RATE: 80 BPM

## 2019-11-15 PROCEDURE — 25010000002 PROPOFOL 10 MG/ML EMULSION: Performed by: NURSE ANESTHETIST, CERTIFIED REGISTERED

## 2019-11-15 PROCEDURE — 25010000002 FENTANYL CITRATE (PF) 100 MCG/2ML SOLUTION: Performed by: NURSE ANESTHETIST, CERTIFIED REGISTERED

## 2019-11-15 PROCEDURE — 25010000002 LEVOFLOXACIN PER 250 MG: Performed by: UROLOGY

## 2019-11-15 PROCEDURE — 25010000002 FENTANYL CITRATE (PF) 100 MCG/2ML SOLUTION: Performed by: ANESTHESIOLOGY

## 2019-11-15 PROCEDURE — 25010000002 ONDANSETRON PER 1 MG: Performed by: NURSE ANESTHETIST, CERTIFIED REGISTERED

## 2019-11-15 PROCEDURE — 25010000002 DEXAMETHASONE PER 1 MG: Performed by: NURSE ANESTHETIST, CERTIFIED REGISTERED

## 2019-11-15 PROCEDURE — 25010000002 PHENYLEPHRINE PER 1 ML: Performed by: NURSE ANESTHETIST, CERTIFIED REGISTERED

## 2019-11-15 RX ORDER — MAGNESIUM HYDROXIDE 1200 MG/15ML
LIQUID ORAL AS NEEDED
Status: DISCONTINUED | OUTPATIENT
Start: 2019-11-15 | End: 2019-11-15 | Stop reason: HOSPADM

## 2019-11-15 RX ORDER — LIDOCAINE HYDROCHLORIDE 10 MG/ML
INJECTION, SOLUTION EPIDURAL; INFILTRATION; INTRACAUDAL; PERINEURAL AS NEEDED
Status: DISCONTINUED | OUTPATIENT
Start: 2019-11-15 | End: 2019-11-15 | Stop reason: SURG

## 2019-11-15 RX ORDER — PROMETHAZINE HYDROCHLORIDE 25 MG/ML
6.25 INJECTION, SOLUTION INTRAMUSCULAR; INTRAVENOUS ONCE AS NEEDED
Status: DISCONTINUED | OUTPATIENT
Start: 2019-11-15 | End: 2019-11-15 | Stop reason: HOSPADM

## 2019-11-15 RX ORDER — FAMOTIDINE 20 MG/1
20 TABLET, FILM COATED ORAL ONCE
Status: COMPLETED | OUTPATIENT
Start: 2019-11-15 | End: 2019-11-15

## 2019-11-15 RX ORDER — PROPOFOL 10 MG/ML
VIAL (ML) INTRAVENOUS AS NEEDED
Status: DISCONTINUED | OUTPATIENT
Start: 2019-11-15 | End: 2019-11-15 | Stop reason: SURG

## 2019-11-15 RX ORDER — PROMETHAZINE HYDROCHLORIDE 25 MG/1
25 SUPPOSITORY RECTAL ONCE AS NEEDED
Status: DISCONTINUED | OUTPATIENT
Start: 2019-11-15 | End: 2019-11-15 | Stop reason: HOSPADM

## 2019-11-15 RX ORDER — FENTANYL CITRATE 50 UG/ML
25 INJECTION, SOLUTION INTRAMUSCULAR; INTRAVENOUS
Status: DISCONTINUED | OUTPATIENT
Start: 2019-11-15 | End: 2019-11-15 | Stop reason: HOSPADM

## 2019-11-15 RX ORDER — PROMETHAZINE HYDROCHLORIDE 25 MG/1
25 TABLET ORAL ONCE AS NEEDED
Status: DISCONTINUED | OUTPATIENT
Start: 2019-11-15 | End: 2019-11-15 | Stop reason: HOSPADM

## 2019-11-15 RX ORDER — HYDROMORPHONE HYDROCHLORIDE 1 MG/ML
0.2 INJECTION, SOLUTION INTRAMUSCULAR; INTRAVENOUS; SUBCUTANEOUS
Status: DISCONTINUED | OUTPATIENT
Start: 2019-11-15 | End: 2019-11-15 | Stop reason: HOSPADM

## 2019-11-15 RX ORDER — FENTANYL CITRATE 50 UG/ML
50 INJECTION, SOLUTION INTRAMUSCULAR; INTRAVENOUS ONCE
Status: COMPLETED | OUTPATIENT
Start: 2019-11-15 | End: 2019-11-15

## 2019-11-15 RX ORDER — LEVOFLOXACIN 5 MG/ML
500 INJECTION, SOLUTION INTRAVENOUS ONCE
Status: COMPLETED | OUTPATIENT
Start: 2019-11-15 | End: 2019-11-15

## 2019-11-15 RX ORDER — FENTANYL CITRATE 50 UG/ML
INJECTION, SOLUTION INTRAMUSCULAR; INTRAVENOUS AS NEEDED
Status: DISCONTINUED | OUTPATIENT
Start: 2019-11-15 | End: 2019-11-15 | Stop reason: SURG

## 2019-11-15 RX ORDER — LIDOCAINE HYDROCHLORIDE 10 MG/ML
1 INJECTION, SOLUTION INFILTRATION; PERINEURAL ONCE
Status: COMPLETED | OUTPATIENT
Start: 2019-11-15 | End: 2019-11-15

## 2019-11-15 RX ORDER — ONDANSETRON 2 MG/ML
INJECTION INTRAMUSCULAR; INTRAVENOUS AS NEEDED
Status: DISCONTINUED | OUTPATIENT
Start: 2019-11-15 | End: 2019-11-15 | Stop reason: SURG

## 2019-11-15 RX ORDER — DEXAMETHASONE SODIUM PHOSPHATE 4 MG/ML
INJECTION, SOLUTION INTRA-ARTICULAR; INTRALESIONAL; INTRAMUSCULAR; INTRAVENOUS; SOFT TISSUE AS NEEDED
Status: DISCONTINUED | OUTPATIENT
Start: 2019-11-15 | End: 2019-11-15 | Stop reason: SURG

## 2019-11-15 RX ORDER — SODIUM CHLORIDE, SODIUM LACTATE, POTASSIUM CHLORIDE, CALCIUM CHLORIDE 600; 310; 30; 20 MG/100ML; MG/100ML; MG/100ML; MG/100ML
20 INJECTION, SOLUTION INTRAVENOUS CONTINUOUS
Status: DISCONTINUED | OUTPATIENT
Start: 2019-11-15 | End: 2019-11-15 | Stop reason: HOSPADM

## 2019-11-15 RX ADMIN — PROPOFOL 50 MG: 10 INJECTION, EMULSION INTRAVENOUS at 09:52

## 2019-11-15 RX ADMIN — FENTANYL CITRATE 50 MCG: 50 INJECTION, SOLUTION INTRAMUSCULAR; INTRAVENOUS at 09:50

## 2019-11-15 RX ADMIN — PHENYLEPHRINE HYDROCHLORIDE 200 MCG: 10 INJECTION INTRAVENOUS at 10:19

## 2019-11-15 RX ADMIN — PHENYLEPHRINE HYDROCHLORIDE 200 MCG: 10 INJECTION INTRAVENOUS at 10:07

## 2019-11-15 RX ADMIN — FENTANYL CITRATE 50 MCG: 50 INJECTION, SOLUTION INTRAMUSCULAR; INTRAVENOUS at 10:00

## 2019-11-15 RX ADMIN — ONDANSETRON 4 MG: 2 INJECTION INTRAMUSCULAR; INTRAVENOUS at 10:28

## 2019-11-15 RX ADMIN — PHENYLEPHRINE HYDROCHLORIDE 200 MCG: 10 INJECTION INTRAVENOUS at 10:31

## 2019-11-15 RX ADMIN — PHENYLEPHRINE HYDROCHLORIDE 100 MCG: 10 INJECTION INTRAVENOUS at 09:58

## 2019-11-15 RX ADMIN — LIDOCAINE HYDROCHLORIDE 100 MG: 10 INJECTION, SOLUTION EPIDURAL; INFILTRATION; INTRACAUDAL; PERINEURAL at 09:50

## 2019-11-15 RX ADMIN — PROPOFOL 50 MG: 10 INJECTION, EMULSION INTRAVENOUS at 10:24

## 2019-11-15 RX ADMIN — PHENYLEPHRINE HYDROCHLORIDE 200 MCG: 10 INJECTION INTRAVENOUS at 10:13

## 2019-11-15 RX ADMIN — PHENYLEPHRINE HYDROCHLORIDE 200 MCG: 10 INJECTION INTRAVENOUS at 10:28

## 2019-11-15 RX ADMIN — PHENYLEPHRINE HYDROCHLORIDE 200 MCG: 10 INJECTION INTRAVENOUS at 10:10

## 2019-11-15 RX ADMIN — PROPOFOL 150 MG: 10 INJECTION, EMULSION INTRAVENOUS at 09:50

## 2019-11-15 RX ADMIN — PROPOFOL 50 MG: 10 INJECTION, EMULSION INTRAVENOUS at 10:25

## 2019-11-15 RX ADMIN — DEXAMETHASONE SODIUM PHOSPHATE 8 MG: 4 INJECTION, SOLUTION INTRAMUSCULAR; INTRAVENOUS at 09:59

## 2019-11-15 RX ADMIN — FENTANYL CITRATE 25 MCG: 0.05 INJECTION, SOLUTION INTRAMUSCULAR; INTRAVENOUS at 11:21

## 2019-11-15 RX ADMIN — PHENYLEPHRINE HYDROCHLORIDE 100 MCG: 10 INJECTION INTRAVENOUS at 10:01

## 2019-11-15 RX ADMIN — PHENYLEPHRINE HYDROCHLORIDE 100 MCG: 10 INJECTION INTRAVENOUS at 09:55

## 2019-11-15 RX ADMIN — PHENYLEPHRINE HYDROCHLORIDE 100 MCG: 10 INJECTION INTRAVENOUS at 10:04

## 2019-11-15 RX ADMIN — LIDOCAINE HYDROCHLORIDE 0.5 ML: 10 INJECTION, SOLUTION EPIDURAL; INFILTRATION; INTRACAUDAL; PERINEURAL at 07:36

## 2019-11-15 RX ADMIN — FENTANYL CITRATE 25 MCG: 0.05 INJECTION, SOLUTION INTRAMUSCULAR; INTRAVENOUS at 11:10

## 2019-11-15 RX ADMIN — SODIUM CHLORIDE, POTASSIUM CHLORIDE, SODIUM LACTATE AND CALCIUM CHLORIDE 20 ML/HR: 600; 310; 30; 20 INJECTION, SOLUTION INTRAVENOUS at 07:36

## 2019-11-15 RX ADMIN — PHENYLEPHRINE HYDROCHLORIDE 200 MCG: 10 INJECTION INTRAVENOUS at 10:16

## 2019-11-15 RX ADMIN — PHENYLEPHRINE HYDROCHLORIDE 200 MCG: 10 INJECTION INTRAVENOUS at 10:34

## 2019-11-15 RX ADMIN — FENTANYL CITRATE 50 MCG: 0.05 INJECTION, SOLUTION INTRAMUSCULAR; INTRAVENOUS at 09:11

## 2019-11-15 RX ADMIN — LEVOFLOXACIN 500 MG: 5 INJECTION, SOLUTION INTRAVENOUS at 09:44

## 2019-11-15 RX ADMIN — FAMOTIDINE 20 MG: 20 TABLET ORAL at 07:30

## 2019-11-15 NOTE — ANESTHESIA PROCEDURE NOTES
Airway  Urgency: elective    Date/Time: 11/15/2019 9:50 AM  Airway not difficult    General Information and Staff    Patient location during procedure: OR  CRNA: Cassandra Jama CRNA    Indications and Patient Condition  Indications for airway management: airway protection    Preoxygenated: yes  MILS maintained throughout  Mask difficulty assessment: 1 - vent by mask    Final Airway Details  Final airway type: supraglottic airway      Successful airway: I-gel  Size 5    Number of attempts at approach: 1    Additional Comments  Pt to OR 20 . Pt moved self to OR table. ASA monitors applied. Pre-O2 with 100%. SIVI. LMA placed atraumatic. +ETCO2. +BBS.

## 2019-11-15 NOTE — OP NOTE
Preop diagnosis: Right renal calculus    Postop diagnosis: Same    Procedure: Right ESWL with cystoscopy and stent removal    After induction of general anesthesia patient was prepped and draped in the supine position.  Fluoroscopy was used to localize the stone for treatment.  After this was completed the lithotripsy was begun.  Patient received 3000 shocks.  There was good fragmentation of the stone.  Flexible cystoscope was inserted.  The urethra was normal.  Prostate showed anatomical obstruction.  The stent was protruding from the right ureteral orifice.  This was grasped with foreign body forceps and extracted without difficulty.  The scope was removed and patient taught procedure well at the operating satisfactory condition

## 2019-11-15 NOTE — ANESTHESIA POSTPROCEDURE EVALUATION
Patient: Miguel Angel Crane    Procedure Summary     Date:  11/15/19 Room / Location:   [facility]  [facility]  [facility]    Anesthesia Start:  0943 Anesthesia Stop:      Procedure:  EXTRACORPOREAL SHOCKWAVE LITHOTRIPSY WITH  STENT REMOVAL (Right ) Diagnosis:      Surgeon:  Britton Saba MD Provider:  José Miguel Greenfield MD    Anesthesia Type:  general ASA Status:  4          Anesthesia Type: general  Last vitals  BP   104/50 (11/15/19 1042)   Temp   97 °F (36.1 °C) (11/15/19 1042)   Pulse   77 (11/15/19 1042)   Resp   20 (11/15/19 1042)     SpO2   95 % (11/15/19 1042)     Post Anesthesia Care and Evaluation    Patient location during evaluation: PACU  Patient participation: complete - patient cannot participate  Level of consciousness: responsive to physical stimuli  Pain score: 0  Pain management: adequate  Airway patency: patent  Anesthetic complications: No anesthetic complications    Cardiovascular status: stable  Respiratory status: acceptable, nasal cannula, oral airway and spontaneous ventilation  Hydration status: stable    Comments: Pt transferred to PACU with O2. Vital signs stable. Report to PACU RN and care accepted.

## 2019-11-15 NOTE — INTERVAL H&P NOTE
"Pre-Op H&P (See Recent Office Note Attached for Full H&P)    Chief complaint: Right ureteral stone    Review of Systems:  General ROS:  no fever, chills, rashes, No change since last office visit  Cardiovascular ROS: no chest pain or dyspnea on exertion  Respiratory ROS: no cough, shortness of breath, or wheezing    Meds:    No current facility-administered medications on file prior to encounter.      Current Outpatient Medications on File Prior to Encounter   Medication Sig Dispense Refill   • amLODIPine (NORVASC) 5 MG tablet TAKE 1 TABLET BY MOUTH ONCE DAILY 90 tablet 0   • fluticasone (VERAMYST) 27.5 MCG/SPRAY nasal spray 2 sprays into each nostril daily.     • gabapentin (NEURONTIN) 100 MG capsule Take 200 mg by mouth Every Night.     • levothyroxine (SYNTHROID, LEVOTHROID) 50 MCG tablet Take 50 mcg by mouth daily.     • magnesium hydroxide (MILK OF MAGNESIA) 400 MG/5ML suspension Take 15 mL by mouth Daily As Needed for Constipation.     • Multiple Vitamins-Minerals (PRESERVISION AREDS 2 PO) Take 1 tablet by mouth 2 (Two) Times a Day.     • tamsulosin (FLOMAX) 0.4 MG capsule 24 hr capsule Take 1 capsule by mouth 2 (two) times a day.         Vital Signs:  /84 (BP Location: Right arm, Patient Position: Lying)   Pulse 89   Temp 97.3 °F (36.3 °C) (Tympanic)   Resp 18   Ht 180.3 cm (71\")   Wt 109 kg (240 lb)   SpO2 96%   BMI 33.47 kg/m²     Physical Exam:    CV:  S1S2 regular rate and rhythm, no murmur               Resp:  Clear to auscultation; respirations regular, even and unlabored    Results Review:     Lab Results   Component Value Date    WBC 5.01 11/11/2019    HGB 11.1 (L) 11/11/2019    HCT 34.4 (L) 11/11/2019    MCV 98.0 (H) 11/11/2019     11/11/2019    NEUTROABS 4.92 11/07/2019    GLUCOSE 112 (H) 11/11/2019    BUN 9 11/11/2019    CREATININE 1.27 11/11/2019    EGFRIFNONA 54 (L) 11/11/2019     11/11/2019    K 4.2 11/11/2019    CL 99 11/11/2019    CO2 32.0 (H) 11/11/2019    CALCIUM " 8.5 (L) 11/11/2019    ALBUMIN 3.40 (L) 11/05/2019    AST 20 11/05/2019    ALT 18 11/05/2019    BILITOT 0.5 11/05/2019        I reviewed the patient's new clinical results.    Cancer Staging (if applicable)  Cancer Patient: __ yes _x_no __unknown; If yes, clinical stage T:__ N:__M:__, stage group or __N/A    Assessment/Plan:    Right ureteral calculus s/p Ureteroscopy right stent placement 11/6/19 - Right extracorporeal shockwave lithotripsy with stent removal     Ashley Moffett, APRN  11/15/2019   9:00 AM

## 2019-11-15 NOTE — OUTREACH NOTE
Medical Week 1 Survey      Responses   Facility patient discharged from?  [facility]   Does the patient have one of the following disease processes/diagnoses(primary or secondary)?  Other   Is there a successful TCM telephone encounter documented?  No   Week 1 attempt successful?  No   Revoke  Readmitted          Theresa Garcia RN

## 2019-11-15 NOTE — ANESTHESIA PREPROCEDURE EVALUATION
Anesthesia Evaluation     Patient summary reviewed and Nursing notes reviewed   NPO Solid Status: > 8 hours  NPO Liquid Status: > 8 hours           Airway   Mallampati: II  TM distance: >3 FB  Neck ROM: full  Dental    (+) edentulous    Pulmonary    (+) a smoker Former, COPD,   (-) shortness of breath, recent URI  Cardiovascular     ECG reviewed    (+) hypertension, past MI  1-7 days, CAD,  carotid artery disease (S/P stent ) left carotid  (-) angina, cardiac stents    ROS comment: ECG Sinus rhythm with occasional premature ventricular complexes (PVC's new onset )      Neuro/Psych  (+) CVA (R weak ),     (-) seizures    ROS Comment: Spinal stenosis DDD  GI/Hepatic/Renal/Endo    (+) obesity,   renal disease (creat 1.27 ) CRI, thyroid problem hypothyroidism  (-) diabetes    ROS Comment: Pancreatic Mass     Musculoskeletal     Abdominal    Substance History      OB/GYN          Other   arthritis,      ROS/Med Hx Other: Most Hx from wife   Hct 34   Creat 1.27                 Anesthesia Plan    ASA 4     general     intravenous induction     Anesthetic plan, all risks, benefits, and alternatives have been provided, discussed and informed consent has been obtained with: patient.    Plan discussed with CRNA.

## 2019-12-26 NOTE — TELEPHONE ENCOUNTER
Patient : Chelsea Sahu Age: 85 LENNY TAN               [facility]  Amber Brush acting as a scribe for Dr. Remy Sweeney  Dictation # [id number]  Scribe: Amber Sweeney MD  12/27/19 0013

## 2019-12-30 ENCOUNTER — TELEPHONE (OUTPATIENT)
Dept: GASTROENTEROLOGY | Facility: CLINIC | Age: 84
End: 2019-12-30

## 2019-12-30 NOTE — TELEPHONE ENCOUNTER
Patient's wife called on Friday wanting to make an appt with Dr. Tay, patient spoke to Jessica. Jessica gave message to Dr. Tay. Dr. Tay came up and told Jessica and I, that patient needs to see his pcp. That when patient came in last they discussed they were not going to do anything further from a GI standpoint.   Jessica tried calling back Friday but there was no answer.     Patient called today and I let her know what Dr. Tay recommended. Told her to call his pcp. She said that she thinks it might be gallstones, he is having abd pain. I said that the pcp could check for those. Patient's wife verbalized understanding.

## 2020-01-07 ENCOUNTER — APPOINTMENT (OUTPATIENT)
Dept: CT IMAGING | Facility: HOSPITAL | Age: 85
End: 2020-01-07

## 2020-01-07 ENCOUNTER — APPOINTMENT (OUTPATIENT)
Dept: GENERAL RADIOLOGY | Facility: HOSPITAL | Age: 85
End: 2020-01-07

## 2020-01-07 ENCOUNTER — APPOINTMENT (OUTPATIENT)
Dept: NUCLEAR MEDICINE | Facility: HOSPITAL | Age: 85
End: 2020-01-07

## 2020-01-07 ENCOUNTER — HOSPITAL ENCOUNTER (OUTPATIENT)
Facility: HOSPITAL | Age: 85
Discharge: HOME | End: 2020-01-11
Attending: EMERGENCY MEDICINE | Admitting: EMERGENCY MEDICINE

## 2020-01-07 ENCOUNTER — APPOINTMENT (OUTPATIENT)
Dept: ULTRASOUND IMAGING | Facility: HOSPITAL | Age: 85
End: 2020-01-07

## 2020-01-07 DIAGNOSIS — Z78.9 IMPAIRED MOBILITY AND ADLS: ICD-10-CM

## 2020-01-07 DIAGNOSIS — R11.2 NON-INTRACTABLE VOMITING WITH NAUSEA, UNSPECIFIED VOMITING TYPE: ICD-10-CM

## 2020-01-07 DIAGNOSIS — K81.0 CHOLECYSTITIS, ACUTE: ICD-10-CM

## 2020-01-07 DIAGNOSIS — Z74.09 IMPAIRED FUNCTIONAL MOBILITY, BALANCE, GAIT, AND ENDURANCE: ICD-10-CM

## 2020-01-07 DIAGNOSIS — K81.0 ACUTE CHOLECYSTITIS: ICD-10-CM

## 2020-01-07 DIAGNOSIS — Z74.09 IMPAIRED MOBILITY AND ADLS: ICD-10-CM

## 2020-01-07 DIAGNOSIS — K80.50 BILIARY COLIC: Primary | ICD-10-CM

## 2020-01-07 DIAGNOSIS — K80.50 CHOLEDOCHOLITHIASIS: ICD-10-CM

## 2020-01-07 PROBLEM — I25.10 CORONARY ARTERY DISEASE: Status: ACTIVE | Noted: 2020-01-07

## 2020-01-07 PROBLEM — N18.2 CKD (CHRONIC KIDNEY DISEASE), STAGE II: Status: ACTIVE | Noted: 2020-01-07

## 2020-01-07 PROBLEM — I10 HYPERTENSION: Status: ACTIVE | Noted: 2020-01-07

## 2020-01-07 LAB
ALBUMIN SERPL-MCNC: 4 G/DL (ref 3.5–5.2)
ALBUMIN/GLOB SERPL: 1.8 G/DL
ALP SERPL-CCNC: 69 U/L (ref 39–117)
ALT SERPL W P-5'-P-CCNC: 11 U/L (ref 1–41)
ANION GAP SERPL CALCULATED.3IONS-SCNC: 12 MMOL/L (ref 5–15)
AST SERPL-CCNC: 20 U/L (ref 1–40)
BASOPHILS # BLD AUTO: 0.03 10*3/MM3 (ref 0–0.2)
BASOPHILS NFR BLD AUTO: 0.7 % (ref 0–1.5)
BILIRUB SERPL-MCNC: 0.5 MG/DL (ref 0.2–1.2)
BILIRUB UR QL STRIP: NEGATIVE
BUN BLD-MCNC: 12 MG/DL (ref 8–23)
BUN/CREAT SERPL: 8.8 (ref 7–25)
CALCIUM SPEC-SCNC: 9 MG/DL (ref 8.6–10.5)
CHLORIDE SERPL-SCNC: 100 MMOL/L (ref 98–107)
CLARITY UR: CLEAR
CO2 SERPL-SCNC: 25 MMOL/L (ref 22–29)
COLOR UR: YELLOW
CREAT BLD-MCNC: 1.36 MG/DL (ref 0.76–1.27)
D-LACTATE SERPL-SCNC: 1.5 MMOL/L (ref 0.5–2)
DEPRECATED RDW RBC AUTO: 50.4 FL (ref 37–54)
EOSINOPHIL # BLD AUTO: 0.24 10*3/MM3 (ref 0–0.4)
EOSINOPHIL NFR BLD AUTO: 5.6 % (ref 0.3–6.2)
ERYTHROCYTE [DISTWIDTH] IN BLOOD BY AUTOMATED COUNT: 14 % (ref 12.3–15.4)
GFR SERPL CREATININE-BSD FRML MDRD: 50 ML/MIN/1.73
GLOBULIN UR ELPH-MCNC: 2.2 GM/DL
GLUCOSE BLD-MCNC: 121 MG/DL (ref 65–99)
GLUCOSE UR STRIP-MCNC: NEGATIVE MG/DL
HCT VFR BLD AUTO: 35.9 % (ref 37.5–51)
HGB BLD-MCNC: 12.3 G/DL (ref 13–17.7)
HGB UR QL STRIP.AUTO: NEGATIVE
HOLD SPECIMEN: NORMAL
HOLD SPECIMEN: NORMAL
IMM GRANULOCYTES # BLD AUTO: 0.01 10*3/MM3 (ref 0–0.05)
IMM GRANULOCYTES NFR BLD AUTO: 0.2 % (ref 0–0.5)
KETONES UR QL STRIP: NEGATIVE
LEUKOCYTE ESTERASE UR QL STRIP.AUTO: NEGATIVE
LIPASE SERPL-CCNC: 9 U/L (ref 13–60)
LYMPHOCYTES # BLD AUTO: 1.67 10*3/MM3 (ref 0.7–3.1)
LYMPHOCYTES NFR BLD AUTO: 39 % (ref 19.6–45.3)
MCH RBC QN AUTO: 33.7 PG (ref 26.6–33)
MCHC RBC AUTO-ENTMCNC: 34.3 G/DL (ref 31.5–35.7)
MCV RBC AUTO: 98.4 FL (ref 79–97)
MONOCYTES # BLD AUTO: 0.36 10*3/MM3 (ref 0.1–0.9)
MONOCYTES NFR BLD AUTO: 8.4 % (ref 5–12)
NEUTROPHILS # BLD AUTO: 1.97 10*3/MM3 (ref 1.7–7)
NEUTROPHILS NFR BLD AUTO: 46.1 % (ref 42.7–76)
NITRITE UR QL STRIP: NEGATIVE
NRBC BLD AUTO-RTO: 0 /100 WBC (ref 0–0.2)
PH UR STRIP.AUTO: >=9 [PH] (ref 5–8)
PLATELET # BLD AUTO: 114 10*3/MM3 (ref 140–450)
PMV BLD AUTO: 9.7 FL (ref 6–12)
POTASSIUM BLD-SCNC: 4.4 MMOL/L (ref 3.5–5.2)
PROT SERPL-MCNC: 6.2 G/DL (ref 6–8.5)
PROT UR QL STRIP: NEGATIVE
RBC # BLD AUTO: 3.65 10*6/MM3 (ref 4.14–5.8)
SODIUM BLD-SCNC: 137 MMOL/L (ref 136–145)
SP GR UR STRIP: 1.01 (ref 1–1.03)
TROPONIN T SERPL-MCNC: <0.01 NG/ML (ref 0–0.03)
TROPONIN T SERPL-MCNC: <0.01 NG/ML (ref 0–0.03)
UROBILINOGEN UR QL STRIP: ABNORMAL
WBC NRBC COR # BLD: 4.28 10*3/MM3 (ref 3.4–10.8)
WHOLE BLOOD HOLD SPECIMEN: NORMAL
WHOLE BLOOD HOLD SPECIMEN: NORMAL

## 2020-01-07 PROCEDURE — 25010000002 HYDROMORPHONE 1 MG/ML SOLUTION: Performed by: EMERGENCY MEDICINE

## 2020-01-07 PROCEDURE — 25010000002 PROMETHAZINE PER 50 MG: Performed by: EMERGENCY MEDICINE

## 2020-01-07 PROCEDURE — 96366 THER/PROPH/DIAG IV INF ADDON: CPT

## 2020-01-07 PROCEDURE — 93010 ELECTROCARDIOGRAM REPORT: CPT | Performed by: INTERNAL MEDICINE

## 2020-01-07 PROCEDURE — 25010000002 PIPERACILLIN SOD-TAZOBACTAM PER 1 G: Performed by: EMERGENCY MEDICINE

## 2020-01-07 PROCEDURE — 99220 PR INITIAL OBSERVATION CARE/DAY 70 MINUTES: CPT | Performed by: FAMILY MEDICINE

## 2020-01-07 PROCEDURE — 84484 ASSAY OF TROPONIN QUANT: CPT | Performed by: EMERGENCY MEDICINE

## 2020-01-07 PROCEDURE — 25010000002 HYDROMORPHONE PER 4 MG: Performed by: EMERGENCY MEDICINE

## 2020-01-07 PROCEDURE — 0 TECHNETIUM TC 99M MEBROFENIN KIT: Performed by: FAMILY MEDICINE

## 2020-01-07 PROCEDURE — G0378 HOSPITAL OBSERVATION PER HR: HCPCS

## 2020-01-07 PROCEDURE — 76705 ECHO EXAM OF ABDOMEN: CPT

## 2020-01-07 PROCEDURE — A9537 TC99M MEBROFENIN: HCPCS | Performed by: FAMILY MEDICINE

## 2020-01-07 PROCEDURE — 25010000002 ONDANSETRON PER 1 MG: Performed by: EMERGENCY MEDICINE

## 2020-01-07 PROCEDURE — 25010000002 SINCALIDE PER 5 MCG: Performed by: EMERGENCY MEDICINE

## 2020-01-07 PROCEDURE — 93005 ELECTROCARDIOGRAM TRACING: CPT | Performed by: EMERGENCY MEDICINE

## 2020-01-07 PROCEDURE — 83605 ASSAY OF LACTIC ACID: CPT | Performed by: EMERGENCY MEDICINE

## 2020-01-07 PROCEDURE — 96365 THER/PROPH/DIAG IV INF INIT: CPT

## 2020-01-07 PROCEDURE — 80053 COMPREHEN METABOLIC PANEL: CPT | Performed by: EMERGENCY MEDICINE

## 2020-01-07 PROCEDURE — 83690 ASSAY OF LIPASE: CPT | Performed by: EMERGENCY MEDICINE

## 2020-01-07 PROCEDURE — 25010000002 PIPERACILLIN SOD-TAZOBACTAM PER 1 G: Performed by: FAMILY MEDICINE

## 2020-01-07 PROCEDURE — 93005 ELECTROCARDIOGRAM TRACING: CPT | Performed by: NURSE PRACTITIONER

## 2020-01-07 PROCEDURE — 25010000002 IOPAMIDOL 61 % SOLUTION: Performed by: EMERGENCY MEDICINE

## 2020-01-07 PROCEDURE — 74177 CT ABD & PELVIS W/CONTRAST: CPT

## 2020-01-07 PROCEDURE — 25010000002 MORPHINE PER 10 MG: Performed by: RADIOLOGY

## 2020-01-07 PROCEDURE — 84484 ASSAY OF TROPONIN QUANT: CPT | Performed by: NURSE PRACTITIONER

## 2020-01-07 PROCEDURE — 99285 EMERGENCY DEPT VISIT HI MDM: CPT

## 2020-01-07 PROCEDURE — 96375 TX/PRO/DX INJ NEW DRUG ADDON: CPT

## 2020-01-07 PROCEDURE — 71045 X-RAY EXAM CHEST 1 VIEW: CPT

## 2020-01-07 PROCEDURE — 85025 COMPLETE CBC W/AUTO DIFF WBC: CPT | Performed by: EMERGENCY MEDICINE

## 2020-01-07 PROCEDURE — 78227 HEPATOBIL SYST IMAGE W/DRUG: CPT

## 2020-01-07 PROCEDURE — 81003 URINALYSIS AUTO W/O SCOPE: CPT | Performed by: EMERGENCY MEDICINE

## 2020-01-07 PROCEDURE — 96376 TX/PRO/DX INJ SAME DRUG ADON: CPT

## 2020-01-07 RX ORDER — SODIUM CHLORIDE, SODIUM LACTATE, POTASSIUM CHLORIDE, CALCIUM CHLORIDE 600; 310; 30; 20 MG/100ML; MG/100ML; MG/100ML; MG/100ML
125 INJECTION, SOLUTION INTRAVENOUS CONTINUOUS
Status: ACTIVE | OUTPATIENT
Start: 2020-01-07 | End: 2020-01-08

## 2020-01-07 RX ORDER — SODIUM CHLORIDE 0.9 % (FLUSH) 0.9 %
10 SYRINGE (ML) INJECTION EVERY 12 HOURS SCHEDULED
Status: DISCONTINUED | OUTPATIENT
Start: 2020-01-07 | End: 2020-01-11 | Stop reason: HOSPADM

## 2020-01-07 RX ORDER — LEVOTHYROXINE SODIUM 0.05 MG/1
50 TABLET ORAL
Status: DISCONTINUED | OUTPATIENT
Start: 2020-01-07 | End: 2020-01-11 | Stop reason: HOSPADM

## 2020-01-07 RX ORDER — KIT FOR THE PREPARATION OF TECHNETIUM TC 99M MEBROFENIN 45 MG/10ML
1 INJECTION, POWDER, LYOPHILIZED, FOR SOLUTION INTRAVENOUS
Status: COMPLETED | OUTPATIENT
Start: 2020-01-07 | End: 2020-01-07

## 2020-01-07 RX ORDER — ONDANSETRON 2 MG/ML
4 INJECTION INTRAMUSCULAR; INTRAVENOUS ONCE
Status: COMPLETED | OUTPATIENT
Start: 2020-01-07 | End: 2020-01-07

## 2020-01-07 RX ORDER — MORPHINE SULFATE 2 MG/ML
2 INJECTION, SOLUTION INTRAMUSCULAR; INTRAVENOUS
Status: COMPLETED | OUTPATIENT
Start: 2020-01-07 | End: 2020-01-07

## 2020-01-07 RX ORDER — GABAPENTIN 100 MG/1
200 CAPSULE ORAL NIGHTLY
Status: DISCONTINUED | OUTPATIENT
Start: 2020-01-07 | End: 2020-01-11 | Stop reason: HOSPADM

## 2020-01-07 RX ORDER — ACETAMINOPHEN 325 MG/1
650 TABLET ORAL EVERY 4 HOURS PRN
Status: DISCONTINUED | OUTPATIENT
Start: 2020-01-07 | End: 2020-01-11 | Stop reason: HOSPADM

## 2020-01-07 RX ORDER — NALOXONE HCL 0.4 MG/ML
0.4 VIAL (ML) INJECTION
Status: DISCONTINUED | OUTPATIENT
Start: 2020-01-07 | End: 2020-01-11 | Stop reason: HOSPADM

## 2020-01-07 RX ORDER — OXYCODONE AND ACETAMINOPHEN 10; 325 MG/1; MG/1
1 TABLET ORAL EVERY 4 HOURS PRN
Status: DISCONTINUED | OUTPATIENT
Start: 2020-01-07 | End: 2020-01-10

## 2020-01-07 RX ORDER — TAMSULOSIN HYDROCHLORIDE 0.4 MG/1
0.8 CAPSULE ORAL DAILY
Status: DISCONTINUED | OUTPATIENT
Start: 2020-01-07 | End: 2020-01-11 | Stop reason: HOSPADM

## 2020-01-07 RX ORDER — AMLODIPINE BESYLATE 5 MG/1
5 TABLET ORAL DAILY
Status: DISCONTINUED | OUTPATIENT
Start: 2020-01-07 | End: 2020-01-07

## 2020-01-07 RX ORDER — SODIUM CHLORIDE 0.9 % (FLUSH) 0.9 %
10 SYRINGE (ML) INJECTION AS NEEDED
Status: DISCONTINUED | OUTPATIENT
Start: 2020-01-07 | End: 2020-01-11 | Stop reason: HOSPADM

## 2020-01-07 RX ORDER — ALUMINA, MAGNESIA, AND SIMETHICONE 2400; 2400; 240 MG/30ML; MG/30ML; MG/30ML
15 SUSPENSION ORAL EVERY 6 HOURS PRN
Status: DISCONTINUED | OUTPATIENT
Start: 2020-01-07 | End: 2020-01-11 | Stop reason: HOSPADM

## 2020-01-07 RX ORDER — ONDANSETRON 2 MG/ML
4 INJECTION INTRAMUSCULAR; INTRAVENOUS EVERY 6 HOURS PRN
Status: DISCONTINUED | OUTPATIENT
Start: 2020-01-07 | End: 2020-01-11 | Stop reason: HOSPADM

## 2020-01-07 RX ORDER — DIPHENHYDRAMINE HCL 25 MG
25 CAPSULE ORAL NIGHTLY PRN
Status: DISCONTINUED | OUTPATIENT
Start: 2020-01-07 | End: 2020-01-11 | Stop reason: HOSPADM

## 2020-01-07 RX ORDER — HYDROMORPHONE HYDROCHLORIDE 1 MG/ML
0.5 INJECTION, SOLUTION INTRAMUSCULAR; INTRAVENOUS; SUBCUTANEOUS ONCE
Status: COMPLETED | OUTPATIENT
Start: 2020-01-07 | End: 2020-01-07

## 2020-01-07 RX ORDER — PROMETHAZINE HYDROCHLORIDE 25 MG/ML
12.5 INJECTION, SOLUTION INTRAMUSCULAR; INTRAVENOUS ONCE
Status: COMPLETED | OUTPATIENT
Start: 2020-01-07 | End: 2020-01-07

## 2020-01-07 RX ADMIN — SODIUM CHLORIDE, POTASSIUM CHLORIDE, SODIUM LACTATE AND CALCIUM CHLORIDE 125 ML/HR: 600; 310; 30; 20 INJECTION, SOLUTION INTRAVENOUS at 20:35

## 2020-01-07 RX ADMIN — TAZOBACTAM SODIUM AND PIPERACILLIN SODIUM 3.38 G: 375; 3 INJECTION, SOLUTION INTRAVENOUS at 21:42

## 2020-01-07 RX ADMIN — GABAPENTIN 200 MG: 100 CAPSULE ORAL at 20:35

## 2020-01-07 RX ADMIN — HYDROMORPHONE HYDROCHLORIDE 1 MG: 1 INJECTION, SOLUTION INTRAMUSCULAR; INTRAVENOUS; SUBCUTANEOUS at 06:57

## 2020-01-07 RX ADMIN — IOPAMIDOL 99 ML: 612 INJECTION, SOLUTION INTRAVENOUS at 08:30

## 2020-01-07 RX ADMIN — MEBROFENIN 1 DOSE: 45 INJECTION, POWDER, LYOPHILIZED, FOR SOLUTION INTRAVENOUS at 13:30

## 2020-01-07 RX ADMIN — TAMSULOSIN HYDROCHLORIDE 0.8 MG: 0.4 CAPSULE ORAL at 12:33

## 2020-01-07 RX ADMIN — METOPROLOL TARTRATE 12.5 MG: 25 TABLET, FILM COATED ORAL at 21:42

## 2020-01-07 RX ADMIN — SODIUM CHLORIDE 500 ML: 9 INJECTION, SOLUTION INTRAVENOUS at 06:55

## 2020-01-07 RX ADMIN — PROMETHAZINE HYDROCHLORIDE 12.5 MG: 25 INJECTION INTRAMUSCULAR; INTRAVENOUS at 09:08

## 2020-01-07 RX ADMIN — LEVOTHYROXINE SODIUM 50 MCG: 50 TABLET ORAL at 12:33

## 2020-01-07 RX ADMIN — SODIUM CHLORIDE, POTASSIUM CHLORIDE, SODIUM LACTATE AND CALCIUM CHLORIDE 125 ML/HR: 600; 310; 30; 20 INJECTION, SOLUTION INTRAVENOUS at 11:43

## 2020-01-07 RX ADMIN — ONDANSETRON 4 MG: 2 INJECTION INTRAMUSCULAR; INTRAVENOUS at 06:58

## 2020-01-07 RX ADMIN — MORPHINE SULFATE 2 MG: 2 INJECTION, SOLUTION INTRAMUSCULAR; INTRAVENOUS at 15:09

## 2020-01-07 RX ADMIN — SODIUM CHLORIDE 500 ML: 9 INJECTION, SOLUTION INTRAVENOUS at 11:04

## 2020-01-07 RX ADMIN — TAZOBACTAM SODIUM AND PIPERACILLIN SODIUM 3.38 G: 375; 3 INJECTION, SOLUTION INTRAVENOUS at 11:03

## 2020-01-07 RX ADMIN — HYDROMORPHONE HYDROCHLORIDE 0.5 MG: 1 INJECTION, SOLUTION INTRAMUSCULAR; INTRAVENOUS; SUBCUTANEOUS at 09:08

## 2020-01-07 RX ADMIN — SINCALIDE 2.3 MCG: 5 INJECTION, POWDER, LYOPHILIZED, FOR SOLUTION INTRAVENOUS at 16:07

## 2020-01-07 RX ADMIN — AMLODIPINE BESYLATE 5 MG: 5 TABLET ORAL at 12:33

## 2020-01-07 NOTE — PROGRESS NOTES
Discharge Planning Assessment  [facility]     Patient Name: Miguel Angel Crane  MRN: [id number]  Today's Date: 1/7/2020    Admit Date: 1/7/2020    Discharge Needs Assessment     Row Name 01/07/20 1111       Living Environment    Lives With  spouse    Name(s) of Who Lives With Patient  JUNE    Current Living Arrangements  home/apartment/condo    Primary Care Provided by  spouse/significant other    Provides Primary Care For  no one, unable/limited ability to care for self    Family Caregiver if Needed  spouse    Family Caregiver Names  JUNE    Quality of Family Relationships  helpful;involved;supportive    Able to Return to Prior Arrangements  yes       Resource/Environmental Concerns    Resource/Environmental Concerns  none    Transportation Concerns  car, none       Transition Planning    Patient/Family Anticipates Transition to  home    Patient/Family Anticipated Services at Transition  none    Transportation Anticipated  family or friend will provide       Discharge Needs Assessment    Readmission Within the Last 30 Days  no previous admission in last 30 days    Concerns to be Addressed  denies needs/concerns at this time    Equipment Currently Used at Home  walker, rolling    Anticipated Changes Related to Illness  none    Equipment Needed After Discharge  none    Provided post acute provider list?  -- PLAN IS HOME        Discharge Plan     Row Name 01/07/20 1114       Plan    Plan Comments  PT LIVES IN [facility] WITH HIS WIFE. JUNE ASSISTS WITH PT CARE, HE USES A ROLLING WALKER TO AMBULATE. CURRENTLY NO OUTPT SERVICES. PCP IS MEET [facility]    Final Discharge Disposition Code  01 - home or self-care    Row Name 01/07/20 1113       Plan    Plan  INITIAL    Plan Comments         Destination      Coordination has not been started for this encounter.      Durable Medical Equipment      Coordination has not been started for this encounter.      Dialysis/Infusion      Coordination has not been started for this  encounter.      Home Medical Care      Coordination has not been started for this encounter.      Therapy      Coordination has not been started for this encounter.      Community Resources      Coordination has not been started for this encounter.          Demographic Summary    No documentation.       Functional Status     Row Name 01/07/20 1110       Functional Status    Usual Activity Tolerance  moderate    Current Activity Tolerance  fair       Functional Status, IADL    Medications  assistive person    Meal Preparation  assistive person    Housekeeping  assistive person    Laundry  assistive person    Shopping  assistive person       Mental Status    General Appearance WDL  WDL       Mental Status Summary    Recent Changes in Mental Status/Cognitive Functioning  no changes       Employment/    Employment Status  retired        Psychosocial    No documentation.       Abuse/Neglect    No documentation.       Legal    No documentation.       Substance Abuse    No documentation.       Patient Forms    No documentation.           Shaina Elizabeth RN

## 2020-01-07 NOTE — PLAN OF CARE
Problem: Patient Care Overview  Goal: Plan of Care Review  Outcome: Ongoing (interventions implemented as appropriate)  Flowsheets (Taken 1/7/2020 1204)  Plan of Care Reviewed With: patient;spouse  Note:   Pt adm from ED today. HIDA complete. Full liquid diet. Waiting for HIDA results. NPO after MN for potenital choly. VSS. Wife at bedside. Will continue to monitor pt.

## 2020-01-07 NOTE — H&P
"    [facility] Medicine Services  Clinical Decision Unit  HISTORY & PHYSICAL    Patient Name: Miguel Angel Crane  : 1932  MRN: [id number]  Primary Care Physician: Yumiko Mata DO  Date of admission: 2020  6:43 AM      Subjective   Subjective     Chief Complaint:  RUQ pain    HPI:  Miguel Angel Crane is a 87 y.o. male with PMHx of HTN, hx of CVA, stable CAD/RAQUEL, hypothyroidism, BPH, and biliary colic presented to [facility] ED with 9/10 aching RUQ pain unrelieved by his home chronic pain medication for ~24-48hrs.  Has had similar pain for ~1 yr but over past 2 months has escalated in frequency and duration over time.  Bilious N/V x2, worse post-prandially, has limited his PO intake for ~2 days and feels extremely dehydrated.  \"I'm miserable, if you don't take this gallbladder out I might as well just die\".  Pain mildly improved in ED with dilaudid but still 4-5/10.  No appetite. Denies fevers, chills.    Review of Systems   Gen- see above  CV- No chest pain, palpitations  Resp- No cough, dyspnea  GI- see above  Skin - No rash  All other systems reviewed and negative    Personal History     Past Medical History:   Diagnosis Date   • Arthritis    • BPH (benign prostatic hyperplasia)    • Cataracts, both eyes    • Chronic constipation    • Coronary artery disease    • Disease of thyroid gland    • Hypertension    • Kidney stones    • Left carotid stenosis    • Low back pain    • Mass of pancreas    • Myocardial infarct (CMS/HCC)    • Stroke (CMS/HCC)     left MCA, secondary to left carotid stenosis       Past Surgical History:   Procedure Laterality Date   • CAROTID STENT Left 2016    sx with prior CVA   • CATARACT EXTRACTION W/ INTRAOCULAR LENS  IMPLANT, BILATERAL Bilateral    • CYSTOSCOPY URETEROSCOPY Right 2019    Procedure: CYSTOSCOPY RIGHT URETERSCOPY RIGHT URETERAL STENT PLACEMENT;  Surgeon: Britton Saba MD;  Location: [facility];  Service: Urology   • EXTRACORPOREAL " SHOCKWAVE LITHOTRIPSY (ESWL), STENT INSERTION/REMOVAL Right 11/15/2019    Procedure: EXTRACORPOREAL SHOCKWAVE LITHOTRIPSY WITH  STENT REMOVAL;  Surgeon: Britton Saba MD;  Location: [facility];  Service: Urology   • LUMBAR LAMINECTOMY DISCECTOMY DECOMPRESSION N/A 10/14/2016    Procedure: LUMBAR LAMINECTOMY  L3-L4;  Surgeon: George Richmond MD;  Location: [facility] OR;  Service:    • TONSILLECTOMY         Family History: family history includes Heart disease in his father and mother. Otherwise pertinent FHx was reviewed and unremarkable.     Social History:  reports that he quit smoking about 28 years ago. He has a 21.00 pack-year smoking history. He has never used smokeless tobacco. He reports that he does not drink alcohol or use drugs.  Social History     Social History Narrative   • Not on file       Medications:  Available home medication information reviewed.    (Not in a hospital admission)    No Known Allergies    Objective   Objective     Vital Signs:   Temp:  [97.9 °F (36.6 °C)] 97.9 °F (36.6 °C)  Heart Rate:  [75-90] 80  Resp:  [18] 18  BP: (118-136)/(67-74) 118/70        Physical Exam   Constitutional: No acute distress, awake, alert, nontoxic, obese body habitus  HENT: NCAT, mucous membranes DRY  Respiratory: mild wheeze L>R cleared after cough, good effort, nonlabored respirations   Cardiovascular: RRR  Gastrointestinal: Positive bowel sounds, soft, obese, TTP (+) Quiñones's RUQ  Musculoskeletal: No peripheral edema, normal muscle tone for age  Psychiatric: Appropriate affect, good insight and judgement, cooperative, reliable historian    Results Reviewed:  Normal WBC  Chemestries overall unremarkable, CKDII appears near baseline although cratinine slightly bumped due to dehydration (currently 1.36 with baseline ~1.25)  Lipase normal  Lactate normal  CT abd/pelv: Focal wall thickening of the gallbladder identified without gallbladder distention or CT evidence for pericholecystic inflammation.  HIDA  requested by Dr. 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 Gan agrees CCY appropriate will make NPO p MN  - Dr. Gan aware of admission  - suspect will have elective CCY this stay based on severity of sx's -->  Recent urologic surgery in Nov 2019 without complication, has stable CAD and HTN, creatinine overall unimpressive.  Patient represents an acceptable risk for anesthesia.  - pancreatic hypodensity on CT abd/pelv stable compared to Nov and is known dx (on patient's chronic med hx list)    Admission Status:   I believe this patient meets OBSERVATION status, however if further evaluation or treatment plans warrant, status may change.  Based upon current information, I predict patient's care encounter to be less than or equal to 2 midnights.      Discharge Blueprint:   1. Presumably will be CCY candidate (hopefully for tomorrow 1/8/20) and would be appropriate for d/c post-op when agreeable with Dr. Gan  2. Tolerating adequate PO  3. Ambulating independently or near his baseline  4. Pain controlled (takes chronic opiates so may marcio increased regimen for acute pain post-op)    Electronically signed by Yuli Lombardi MD, 01/07/20, 10:51 AM.

## 2020-01-07 NOTE — CONSULTS
General Surgery Consultation Note    Date of Service: 1/7/2020  Miguel Angel Crane  [id number]  4/9/1932      Referring Provider: Yuli Lombardi MD    Location of Consult: Hospital floor     Reason for Consultation: Cholecystitis       History of Present Illness:  I am seeing, Miguel Angel Crane, in consultation for Yuli Lombardi MD regarding cholecystitis.  87-year-old gentleman presented to the emergency room with 2 days of severe right upper quadrant abdominal pain.  He has had abdominal pain for the past several months though over the past few days this became unbearable.  His pain has been associated with nausea and vomiting, nonbilious and nonbloody.  The pain and maximal intensity is 9 out of 10 and colicky.  He denies dark urine or scleral icterus.  He has had no prior intra-abdominal operations.  There are no other significant modifying factors.    Problem List Items Addressed This Visit        Nervous and Auditory    * (Principal) Biliary colic - Primary      Other Visit Diagnoses     Non-intractable vomiting with nausea, unspecified vomiting type        Acute cholecystitis              Past Medical History:   Diagnosis Date   • Arthritis    • BPH (benign prostatic hyperplasia)    • Cataracts, both eyes    • Chronic constipation    • Coronary artery disease    • Disease of thyroid gland    • Hypertension    • Kidney stones    • Left carotid stenosis    • Low back pain    • Mass of pancreas    • Myocardial infarct (CMS/HCC) 1993   • Stroke (CMS/HCC)     left MCA, secondary to left carotid stenosis       Past Surgical History:   • CAROTID STENT    sx with prior CVA   • CATARACT EXTRACTION W/ INTRAOCULAR LENS  IMPLANT, BILATERAL   • CYSTOSCOPY URETEROSCOPY    Procedure: CYSTOSCOPY RIGHT URETERSCOPY RIGHT URETERAL STENT PLACEMENT;  Surgeon: Britton Saba MD;  Location: [facility];  Service: Urology   • EXTRACORPOREAL SHOCKWAVE LITHOTRIPSY (ESWL), STENT INSERTION/REMOVAL    Procedure: EXTRACORPOREAL SHOCKWAVE  LITHOTRIPSY WITH  STENT REMOVAL;  Surgeon: Britton Saba MD;  Location:  [facility];  Service: Urology   • LUMBAR LAMINECTOMY DISCECTOMY DECOMPRESSION    Procedure: LUMBAR LAMINECTOMY  L3-L4;  Surgeon: George Richmond MD;  Location:  [facility];  Service:    • TONSILLECTOMY       No Known Allergies    No current facility-administered medications on file prior to encounter.      Current Outpatient Medications on File Prior to Encounter   Medication Sig Dispense Refill   • amLODIPine (NORVASC) 5 MG tablet TAKE 1 TABLET BY MOUTH ONCE DAILY 90 tablet 0   • gabapentin (NEURONTIN) 100 MG capsule Take 200 mg by mouth Every Night.     • HYDROcodone-acetaminophen (NORCO)  MG per tablet Take 1 tablet by mouth Every 4 (Four) Hours As Needed. 25 tablet 0   • levothyroxine (SYNTHROID, LEVOTHROID) 50 MCG tablet Take 50 mcg by mouth daily.     • Multiple Vitamins-Minerals (PRESERVISION AREDS 2 PO) Take 1 tablet by mouth 2 (Two) Times a Day.     • tamsulosin (FLOMAX) 0.4 MG capsule 24 hr capsule Take 2 capsules by mouth Daily.     • [DISCONTINUED] ciprofloxacin (CIPRO) 500 MG tablet Take 1 tablet by mouth 2 (Two) Times a Day. 14 tablet 0   • [DISCONTINUED] fluticasone (VERAMYST) 27.5 MCG/SPRAY nasal spray 2 sprays into each nostril daily.     • [DISCONTINUED] magnesium hydroxide (MILK OF MAGNESIA) 400 MG/5ML suspension Take 15 mL by mouth Daily As Needed for Constipation.           Current Facility-Administered Medications:   •  acetaminophen (TYLENOL) tablet 650 mg, 650 mg, Oral, Q4H PRN, Yuli Lombardi MD  •  aluminum-magnesium hydroxide-simethicone (MAALOX MAX) 400-400-40 MG/5ML suspension 15 mL, 15 mL, Oral, Q6H PRN, Yuli Lombardi MD  •  amLODIPine (NORVASC) tablet 5 mg, 5 mg, Oral, Daily, Yuli Lombardi MD, 5 mg at 01/07/20 1233  •  diphenhydrAMINE (BENADRYL) capsule 25 mg, 25 mg, Oral, Nightly PRN, Yuli Lombardi MD  •  gabapentin (NEURONTIN) capsule 200 mg, 200 mg, Oral, Nightly, Yuli Lombardi MD  •  HYDROmorphone  (DILAUDID) injection 1 mg, 1 mg, Intravenous, Q4H PRN **AND** naloxone (NARCAN) injection 0.4 mg, 0.4 mg, Intravenous, Q5 Min PRN, Yuli Lombardi MD  •  lactated ringers infusion, 125 mL/hr, Intravenous, Continuous, Yuli Lombardi MD, Last Rate: 125 mL/hr at 20 1607, 125 mL/hr at 20 1607  •  levothyroxine (SYNTHROID, LEVOTHROID) tablet 50 mcg, 50 mcg, Oral, Q AM, Yuli Lombardi MD, 50 mcg at 20 1233  •  ondansetron (ZOFRAN) injection 4 mg, 4 mg, Intravenous, Q6H PRN, Yuli Lombardi MD  •  oxyCODONE-acetaminophen (PERCOCET)  MG per tablet 1 tablet, 1 tablet, Oral, Q4H PRN, Yuli Lombardi MD  •  sodium chloride 0.9 % flush 10 mL, 10 mL, Intravenous, PRN, Douglas Park MD  •  sodium chloride 0.9 % flush 10 mL, 10 mL, Intravenous, Q12H, Yuli Lombardi MD  •  sodium chloride 0.9 % flush 10 mL, 10 mL, Intravenous, PRN, Yuli Lombardi MD  •  tamsulosin (FLOMAX) 24 hr capsule 0.8 mg, 0.8 mg, Oral, Daily, Yuli Lombardi MD, 0.8 mg at 20 1233    Family History   Problem Relation Age of Onset   • Heart disease Mother    • Heart disease Father      Social History     Socioeconomic History   • Marital status:      Spouse name: Not on file   • Number of children: Not on file   • Years of education: Not on file   • Highest education level: Not on file   Tobacco Use   • Smoking status: Former Smoker     Packs/day: 0.50     Years: 42.00     Pack years: 21.00     Last attempt to quit:      Years since quittin.0   • Smokeless tobacco: Never Used   • Tobacco comment: quit 24 years ago   Substance and Sexual Activity   • Alcohol use: No     Comment: 1988   • Drug use: No   • Sexual activity: Defer       Review of Systems:  Review of Systems   Constitutional: Positive for appetite change. Negative for chills and fever.   HENT: Negative for ear discharge, nosebleeds and sneezing.    Eyes: Negative for photophobia and visual disturbance.   Respiratory: Negative for cough, chest tightness and  "stridor.    Cardiovascular: Negative for chest pain and leg swelling.   Gastrointestinal: Positive for abdominal pain, nausea and vomiting. Negative for diarrhea.   Endocrine: Negative for polyphagia and polyuria.   Genitourinary: Negative for difficulty urinating, flank pain and hematuria.   Musculoskeletal: Negative for back pain and myalgias.   Skin: Negative for color change and pallor.   Allergic/Immunologic: Negative for immunocompromised state.   Neurological: Negative for dizziness, syncope and numbness.   Hematological: Negative for adenopathy.   Psychiatric/Behavioral: Negative for agitation and confusion.     Otherwise the 12 point review of systems is negative.    /78 (BP Location: Right arm, Patient Position: Lying)   Pulse 94   Temp 98 °F (36.7 °C) (Oral)   Resp 18   Ht 180.3 cm (71\")   Wt 113 kg (249 lb 12.8 oz)   SpO2 93%   BMI 34.84 kg/m²   Body mass index is 34.84 kg/m².    General: Pleasantly conversant  HEENT: PER, no icterus, normal sclerae  Cardiac: regular rhythm,  no audible rubs  Pulmonary: bilateral breath sounds, non labored  Abdominal: Soft, distended, right upper quadrant tenderness, no generalized peritonitis  Neurologic: awake, alert, no obvious focal deficits  Extremities: warm, no edema  Skin: no obvious rashes nor worrisome lesions seen     CBC  Results from last 7 days   Lab Units 01/07/20  0654   WBC 10*3/mm3 4.28   HEMOGLOBIN g/dL 12.3*   HEMATOCRIT % 35.9*   PLATELETS 10*3/mm3 114*       CMP  Results from last 7 days   Lab Units 01/07/20  0654   SODIUM mmol/L 137   POTASSIUM mmol/L 4.4   CHLORIDE mmol/L 100   CO2 mmol/L 25.0   BUN mg/dL 12   CREATININE mg/dL 1.36*   CALCIUM mg/dL 9.0   BILIRUBIN mg/dL 0.5   ALK PHOS U/L 69   ALT (SGPT) U/L 11   AST (SGOT) U/L 20   GLUCOSE mg/dL 121*       Radiology  Imaging Results (Last 72 Hours)     Procedure Component Value Units Date/Time    NM HIDA Scan With Pharmacological Intervention [[id number]] Collected:  01/07/20 1619     " Updated:  01/07/20 1658    Narrative:       EXAMINATION: NM HIDA SCAN WITH PHARMACOLOGICAL INTERVENTION- 01/07/2020     INDICATION: RUQ pain, no fever, no elevated WBC     TECHNIQUE: Patient was injected with 7.68 mCi of technetium choletec.  Images were obtained over a 60 minute duration.     COMPARISON: NONE     FINDINGS: There is initially uniform activity in the liver. No activity  is identified within the gallbladder. Subsequently the patient was given  intravenous morphine sulfate and imaged for an additional 30 minutes,  again showing no gallbladder activity.       Impression:       There is no isotope in the gallbladder initially nor  following intravenous morphine sulfate. These findings are consistent  with acute or chronic cholecystitis.     D:  01/07/2020  E:  01/07/2020           US Gallbladder [[id number]] Collected:  01/07/20 1029     Updated:  01/07/20 1100    Narrative:       EXAMINATION: US GALLBLADDER- 01/07/2020     INDICATION: RUQ pain     TECHNIQUE: Grayscale and color Doppler ultrasonographic imaging of the  gallbladder and right upper quadrant was performed.     COMPARISON: Gallbladder ultrasound 11/05/2019, CT abdomen and pelvis  01/07/2020.     FINDINGS: Examination is somewhat limited secondary to patient body  habitus and adjacent bowel gas artifact.     Visualized portions of the pancreas do not demonstrate obvious  abnormality although are limited secondary to adjacent bowel gas  artifact. Identified within the mid body of the pancreas is a hypoechoic  area measuring 5.2 x 3.0 x 3.6 cm which appears partially cystic and  likely relates to the lesion seen on the accompanying CT abdomen and  pelvis performed 01/07/2020 and may be overestimated in size on this  ultrasound exam. No peripancreatic fluid identified.     The liver appears homogeneous without evidence for hepatic mass or  lesion identified. No perihepatic fluid noted. Please note that patient  body habitus limits evaluation of  the liver specifically.     The gallbladder demonstrate multiple echogenic foci with posterior  acoustic shadowing and gallbladder debris most consistent with  cholelithiasis. The gallbladder wall is thickened measuring up to 7 mm  in thickness. No pericholecystic fluid is identified. The common bile  duct measures 5 mm in size, not unexpected in this patient's age of 87.     The right kidney measures 9.7 x 4.4 x 4.1 cm. No solid renal mass or  hydronephrosis appreciated. There is loss of normal cortical medullary  differentiation likely related to chronic medical renal disease similar  to prior.       Impression:       1. Cholelithiasis with interval development of gallbladder wall  thickening measuring up to 7 mm, new from 11/05/2019. No pericholecystic  fluid identified.     2. Cystic lesion involving the mid body of the pancreas as previously  noted on prior CT abdomen and pelvis examinations which may be  overestimated in size on this ultrasound exam measuring up to possibly  5.2 cm.     D:  01/07/2020  E:  01/07/2020                       CT Abdomen Pelvis With Contrast [[id number]] Collected:  01/07/20 0845     Updated:  01/07/20 0957    Narrative:       EXAMINATION: CT ABDOMEN PELVIS W CONTRAST- 01/07/2020     INDICATION: Abdominal pain     TECHNIQUE: Contrast enhanced CT imaging of the abdomen and pelvis was  performed with additional coronal and sagittal reformatted imaging  provided for review. Additional delayed imaging of the abdomen and  pelvis in the axial plane only was submitted for review.     The radiation dose reduction device was turned on for each scan per the  ALARA (As Low as Reasonably Achievable) protocol.     COMPARISON: CT abdomen and pelvis 11/05/2019     FINDINGS: The visualized lower lungs demonstrate nonspecific bilateral  pleural calcifications as previously seen. The liver is low in  attenuation suggestive of a mild hepatic steatosis. The portal system  appears patent as visualized.  The gallbladder demonstrates wall  thickening of the gallbladder apex measuring up to 1 cm without  pericholecystic inflammation appreciated by CT criteria. Questionable  cholelithiasis identified. The spleen is unremarkable in appearance. The  adrenal glands do not demonstrate abnormality. Again identified within  the mid body of the pancreas is a 2.5 x 2.9 cm hypoattenuated lesion  persisting from 11/05/2019 in which pancreatic neoplasm is not excluded  and requires further follow-up if clinically appropriate. The kidneys  are atrophic similar to prior. There is again identified a 4.5 cm left  renal cyst. Additional smaller subcentimeter cystic regions are  involving the left kidney which are too small to accurately characterize  but statistically represent small renal cysts. Ureters demonstrate  normal course and caliber without evidence for obstructive uropathy. The  previously noted left-sided renal calculus is no longer visualized. The  urinary bladder is partially decompressed. The prostate gland is  heterogenous with coarse calcification and enlarged measuring up to 5.5  cm. No pelvic mass identified. Scattered calcifications are noted in the  lower pelvis, similar to prior. No free fluid identified.  Atherosclerotic calcified and noncalcified disease of the abdominal  aorta and its branches are noted similar to prior.     No free air is identified. The stomach is partially decompressed and  otherwise unremarkable similar to prior exam. There is low-attenuation  gastric fold prominence which could relate to mild gastric edema in the  appropriate clinical setting. The small bowel is nondilated. No evidence  of bowel obstruction appreciated. The appendix appears normal. The colon  demonstrates scattered diverticular disease most pronounced involving  the sigmoid colon without convincing evidence of pericolonic  inflammation or diverticulitis. The distal colon is decompressed,  similar to prior. Surrounding  soft tissues do not reveal acute  abnormality. Delayed imaging of the abdomen and pelvis demonstrates  contrast within the ureters and urinary bladder without extravasation.  Small fat filled left inguinal hernia noted. The visualized bony pelvis  appears intact. Thoracolumbar degenerative changes are identified  without aggressive features. Multilevel posterior disc osteophyte  complexes are identified similar to prior.       Impression:       No acute intra-abdominal or pelvic process is appreciated.  Specifically, the previously noted obstructing right renal calculus has  been removed/resolved. No evidence of new hydronephrosis or obstructive  uropathy appreciated. There is a punctate not affecting left renal  calculus noted.      POTENTIALLY CLINICALLY SIGNIFICANT INCIDENTAL FINDINGS:     - Focal wall thickening of the gallbladder identified without  gallbladder distention or CT evidence for pericholecystic inflammation.  Correlate clinically.     - Mild gastric fold prominence which is a nonspecific finding can be  seen in gastritis. Consider further evaluation with endoscopy if  clinically appropriate.     -Redemonstration of a 2.9 cm hypoattenuated lesion within the mid body  of the pancreas, similar to 11/05/2019 in which underlying pancreatic  neoplasm is not excluded. If clinically appropriate follow-up CT or MRI  of the abdomen with IV contrast (pancreatic mass protocol) can further  evaluate.     -Findings suggestive of early hepatic steatosis.     -Diverticulosis without CT evidence of diverticulitis.     - Heavy calcified and noncalcified atherosclerotic disease of the  abdominal aorta and its branches with focal ectasia of the right common  iliac artery measuring up to 2.1 cm, similar to 11/05/2019.     D:  01/07/2020  E:  01/07/2020       XR Chest 1 View [[id number]] Collected:  01/07/20 0741     Updated:  01/07/20 0743    Narrative:       CR Chest 1 Vw    INDICATION:   Upper abdominal pain.  Right-sided upper abdominal pain for 2 weeks     COMPARISON:    Chest x-ray 11/9/2019.    FINDINGS:  Single portable AP view(s) of the chest.  Cardiac silhouette size is top normal. There are calcified pleural plaques best appreciated on the left. Please correlate for history of asbestos exposure area there is a small amount of bibasilar atelectasis or  scarring unchanged on the right and unchanged to slightly worse on the left. No pleural effusion or pneumothorax. No congestive failure. Allowing for the underlying pleural plaque, no definite acute infiltrate.      Impression:         1. Bilateral calcified pleural plaques. Please correlate for history of asbestos exposure.  2. Redemonstration of bibasilar atelectasis or scarring unchanged on the right unchanged to slightly worse on the left.    Signer Name: Shannon Walters MD   Signed: 1/7/2020 7:41 AM   Workstation Name: [email or web address]    [facility]            Assessment:  Acute cholecystitis  Acute kidney injury  Thrombocytopenia  History of stroke  New onset atrial fibrillation    Plan:  I reviewed the labs and imaging.  His imaging is consistent with acute cholecystitis.  I discussed cholecystectomy with the patient directly, including but not limited to: bleeding, infection, injury to adjacent viscera (duodenum, common bile duct, colon etc.), retained stones, bile leak, need for ERCP, postoperative abscess, an open operation in general, and medical issues from a cardiopulmonary and deep venous thrombosis standpoint.  He understands these risks and wishes to proceed with surgical intervention.  N.p.o. after midnight.      Arnaldo Gan MD  01/07/20  6:18 PM

## 2020-01-07 NOTE — PROGRESS NOTES
[facility] Medicine Services  Clinical Decision Unit  UPDATE NOTE    Patient evaluated by my partner earlier on today's date reviewed.  Interim findings, labs, and charting have been reviewed.  [facility] Problem List has been managed and updated.    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     Patient was admitted and placed on telemetry.  RN noticed a rhythm change and an EKG was ordered.   EKG shows new onset A. Fib.  When patient was asked, he denies what A. fib was or ever having an irregular heart rate. Patient denies any complaints at this time.  Dr. Gan saw the patient and was discussing surgery tomorrow.  He was notified of new onset A. fib and is not concerned.    Updates to Plan:   EKG ordered that shows A-fib with normal rate  Troponin now  EKG and labs in AM    Discharge Blueprint Update:    1. Presumably will be CCY candidate (hopefully for tomorrow 1/8/20) and would be appropriate for d/c post-op when agreeable with Dr. Gan  2. May need cardiology consult prior to surgery  3. Tolerating adequate PO  4. Ambulating independently or near his baseline  5. Pain controlled (takes chronic opiates so may marcio increased regimen for acute pain post-op)    Electronically signed by KERRI Malave, 01/07/20, 5:26 PM.

## 2020-01-08 ENCOUNTER — ANESTHESIA EVENT (OUTPATIENT)
Dept: PERIOP | Facility: HOSPITAL | Age: 85
End: 2020-01-08

## 2020-01-08 ENCOUNTER — ANESTHESIA (OUTPATIENT)
Dept: PERIOP | Facility: HOSPITAL | Age: 85
End: 2020-01-08

## 2020-01-08 ENCOUNTER — APPOINTMENT (OUTPATIENT)
Dept: GENERAL RADIOLOGY | Facility: HOSPITAL | Age: 85
End: 2020-01-08

## 2020-01-08 ENCOUNTER — APPOINTMENT (OUTPATIENT)
Dept: CARDIOLOGY | Facility: HOSPITAL | Age: 85
End: 2020-01-08

## 2020-01-08 LAB
ALBUMIN SERPL-MCNC: 3.2 G/DL (ref 3.5–5.2)
ALBUMIN/GLOB SERPL: 1.7 G/DL
ALP SERPL-CCNC: 53 U/L (ref 39–117)
ALT SERPL W P-5'-P-CCNC: 7 U/L (ref 1–41)
ANION GAP SERPL CALCULATED.3IONS-SCNC: 10 MMOL/L (ref 5–15)
ASCENDING AORTA: 3.6 CM
AST SERPL-CCNC: 11 U/L (ref 1–40)
BH CV ECHO MEAS - AO MAX PG (FULL): 4.1 MMHG
BH CV ECHO MEAS - AO MAX PG: 7.2 MMHG
BH CV ECHO MEAS - AO MEAN PG (FULL): 2.2 MMHG
BH CV ECHO MEAS - AO MEAN PG: 4 MMHG
BH CV ECHO MEAS - AO ROOT AREA (BSA CORRECTED): 1.6
BH CV ECHO MEAS - AO ROOT AREA: 10.6 CM^2
BH CV ECHO MEAS - AO ROOT DIAM: 3.7 CM
BH CV ECHO MEAS - AO V2 MAX: 134.5 CM/SEC
BH CV ECHO MEAS - AO V2 MEAN: 94.2 CM/SEC
BH CV ECHO MEAS - AO V2 VTI: 30.5 CM
BH CV ECHO MEAS - ASC AORTA: 3.6 CM
BH CV ECHO MEAS - AVA(I,A): 2.3 CM^2
BH CV ECHO MEAS - AVA(I,D): 2.3 CM^2
BH CV ECHO MEAS - AVA(V,A): 2.1 CM^2
BH CV ECHO MEAS - AVA(V,D): 2.1 CM^2
BH CV ECHO MEAS - BSA(HAYCOCK): 2.4 M^2
BH CV ECHO MEAS - BSA: 2.3 M^2
BH CV ECHO MEAS - BZI_BMI: 34.7 KILOGRAMS/M^2
BH CV ECHO MEAS - BZI_METRIC_HEIGHT: 180.3 CM
BH CV ECHO MEAS - BZI_METRIC_WEIGHT: 112.9 KG
BH CV ECHO MEAS - EDV(CUBED): 150.5 ML
BH CV ECHO MEAS - EDV(MOD-SP2): 108 ML
BH CV ECHO MEAS - EDV(MOD-SP4): 114 ML
BH CV ECHO MEAS - EDV(TEICH): 136.5 ML
BH CV ECHO MEAS - EF(CUBED): 46.1 %
BH CV ECHO MEAS - EF(MOD-BP): 62 %
BH CV ECHO MEAS - EF(MOD-SP2): 64.8 %
BH CV ECHO MEAS - EF(MOD-SP4): 52.6 %
BH CV ECHO MEAS - EF(TEICH): 38.2 %
BH CV ECHO MEAS - ESV(CUBED): 81.1 ML
BH CV ECHO MEAS - ESV(MOD-SP2): 38 ML
BH CV ECHO MEAS - ESV(MOD-SP4): 54 ML
BH CV ECHO MEAS - ESV(TEICH): 84.4 ML
BH CV ECHO MEAS - FS: 18.6 %
BH CV ECHO MEAS - IVS/LVPW: 1.1
BH CV ECHO MEAS - IVSD: 1.3 CM
BH CV ECHO MEAS - LA DIMENSION: 3.4 CM
BH CV ECHO MEAS - LA/AO: 0.92
BH CV ECHO MEAS - LAD MAJOR: 6.7 CM
BH CV ECHO MEAS - LAT PEAK E' VEL: 7.4 CM/SEC
BH CV ECHO MEAS - LATERAL E/E' RATIO: 10.1
BH CV ECHO MEAS - LV DIASTOLIC VOL/BSA (35-75): 49.2 ML/M^2
BH CV ECHO MEAS - LV MASS(C)D: 272.9 GRAMS
BH CV ECHO MEAS - LV MASS(C)DI: 117.9 GRAMS/M^2
BH CV ECHO MEAS - LV MAX PG: 3.2 MMHG
BH CV ECHO MEAS - LV MEAN PG: 1.8 MMHG
BH CV ECHO MEAS - LV SYSTOLIC VOL/BSA (12-30): 23.3 ML/M^2
BH CV ECHO MEAS - LV V1 MAX: 88.8 CM/SEC
BH CV ECHO MEAS - LV V1 MEAN: 63.8 CM/SEC
BH CV ECHO MEAS - LV V1 VTI: 22.5 CM
BH CV ECHO MEAS - LVIDD: 5.3 CM
BH CV ECHO MEAS - LVIDS: 4.3 CM
BH CV ECHO MEAS - LVLD AP2: 8.3 CM
BH CV ECHO MEAS - LVLD AP4: 7.3 CM
BH CV ECHO MEAS - LVLS AP2: 6.2 CM
BH CV ECHO MEAS - LVLS AP4: 6.1 CM
BH CV ECHO MEAS - LVOT AREA (M): 3.1 CM^2
BH CV ECHO MEAS - LVOT AREA: 3.2 CM^2
BH CV ECHO MEAS - LVOT DIAM: 2 CM
BH CV ECHO MEAS - LVPWD: 1.2 CM
BH CV ECHO MEAS - MED PEAK E' VEL: 4.7 CM/SEC
BH CV ECHO MEAS - MEDIAL E/E' RATIO: 15.6
BH CV ECHO MEAS - MV A MAX VEL: 106.1 CM/SEC
BH CV ECHO MEAS - MV DEC TIME: 0.44 SEC
BH CV ECHO MEAS - MV E MAX VEL: 75.5 CM/SEC
BH CV ECHO MEAS - MV E/A: 0.71
BH CV ECHO MEAS - MV MAX PG: 4.4 MMHG
BH CV ECHO MEAS - MV MEAN PG: 2.1 MMHG
BH CV ECHO MEAS - MV V2 MAX: 105.2 CM/SEC
BH CV ECHO MEAS - MV V2 MEAN: 69.9 CM/SEC
BH CV ECHO MEAS - MV V2 VTI: 35 CM
BH CV ECHO MEAS - MVA(VTI): 2 CM^2
BH CV ECHO MEAS - PA ACC SLOPE: 202.8 CM/SEC^2
BH CV ECHO MEAS - PA ACC TIME: 0.25 SEC
BH CV ECHO MEAS - PA MAX PG: 2.6 MMHG
BH CV ECHO MEAS - PA PR(ACCEL): -31.9 MMHG
BH CV ECHO MEAS - PA V2 MAX: 80.1 CM/SEC
BH CV ECHO MEAS - RAP SYSTOLE: 8 MMHG
BH CV ECHO MEAS - SI(AO): 139 ML/M^2
BH CV ECHO MEAS - SI(CUBED): 30 ML/M^2
BH CV ECHO MEAS - SI(LVOT): 30.8 ML/M^2
BH CV ECHO MEAS - SI(MOD-SP2): 30.2 ML/M^2
BH CV ECHO MEAS - SI(MOD-SP4): 25.9 ML/M^2
BH CV ECHO MEAS - SI(TEICH): 22.5 ML/M^2
BH CV ECHO MEAS - SV(AO): 321.8 ML
BH CV ECHO MEAS - SV(CUBED): 69.4 ML
BH CV ECHO MEAS - SV(LVOT): 71.2 ML
BH CV ECHO MEAS - SV(MOD-SP2): 70 ML
BH CV ECHO MEAS - SV(MOD-SP4): 60 ML
BH CV ECHO MEAS - SV(TEICH): 52.1 ML
BH CV ECHO MEAS - TAPSE (>1.6): 2.1 CM2
BH CV ECHO MEASUREMENTS AVERAGE E/E' RATIO: 12.48
BH CV VAS BP LEFT ARM: NORMAL MMHG
BH CV XLRA - RV BASE: 3.4 CM
BH CV XLRA - RV LENGTH: 8.1 CM
BH CV XLRA - RV MID: 3 CM
BH CV XLRA - TDI S': 12.6 CM/SEC
BILIRUB SERPL-MCNC: 0.6 MG/DL (ref 0.2–1.2)
BUN BLD-MCNC: 11 MG/DL (ref 8–23)
BUN/CREAT SERPL: 8.3 (ref 7–25)
CALCIUM SPEC-SCNC: 8.3 MG/DL (ref 8.6–10.5)
CHLORIDE SERPL-SCNC: 105 MMOL/L (ref 98–107)
CO2 SERPL-SCNC: 25 MMOL/L (ref 22–29)
CREAT BLD-MCNC: 1.33 MG/DL (ref 0.76–1.27)
DEPRECATED RDW RBC AUTO: 52.2 FL (ref 37–54)
ERYTHROCYTE [DISTWIDTH] IN BLOOD BY AUTOMATED COUNT: 14.3 % (ref 12.3–15.4)
GFR SERPL CREATININE-BSD FRML MDRD: 51 ML/MIN/1.73
GLOBULIN UR ELPH-MCNC: 1.9 GM/DL
GLUCOSE BLD-MCNC: 83 MG/DL (ref 65–99)
HCT VFR BLD AUTO: 33.1 % (ref 37.5–51)
HGB BLD-MCNC: 11 G/DL (ref 13–17.7)
LEFT ATRIUM VOLUME INDEX: 22.9 ML/M^2
LEFT ATRIUM VOLUME: 53 ML
LIPASE SERPL-CCNC: 7 U/L (ref 13–60)
MCH RBC QN AUTO: 33.4 PG (ref 26.6–33)
MCHC RBC AUTO-ENTMCNC: 33.2 G/DL (ref 31.5–35.7)
MCV RBC AUTO: 100.6 FL (ref 79–97)
PLATELET # BLD AUTO: 110 10*3/MM3 (ref 140–450)
PMV BLD AUTO: 9.8 FL (ref 6–12)
POTASSIUM BLD-SCNC: 4.4 MMOL/L (ref 3.5–5.2)
POTASSIUM BLD-SCNC: 4.5 MMOL/L (ref 3.5–5.2)
PROT SERPL-MCNC: 5.1 G/DL (ref 6–8.5)
RBC # BLD AUTO: 3.29 10*6/MM3 (ref 4.14–5.8)
SODIUM BLD-SCNC: 140 MMOL/L (ref 136–145)
WBC NRBC COR # BLD: 4.23 10*3/MM3 (ref 3.4–10.8)

## 2020-01-08 PROCEDURE — 80053 COMPREHEN METABOLIC PANEL: CPT | Performed by: SURGERY

## 2020-01-08 PROCEDURE — 25010000002 FENTANYL CITRATE (PF) 100 MCG/2ML SOLUTION: Performed by: ANESTHESIOLOGY

## 2020-01-08 PROCEDURE — A9270 NON-COVERED ITEM OR SERVICE: HCPCS | Performed by: ANESTHESIOLOGY

## 2020-01-08 PROCEDURE — 87186 SC STD MICRODIL/AGAR DIL: CPT | Performed by: SURGERY

## 2020-01-08 PROCEDURE — 87015 SPECIMEN INFECT AGNT CONCNTJ: CPT | Performed by: SURGERY

## 2020-01-08 PROCEDURE — 25010000002 ONDANSETRON PER 1 MG: Performed by: SURGERY

## 2020-01-08 PROCEDURE — 88312 SPECIAL STAINS GROUP 1: CPT | Performed by: SURGERY

## 2020-01-08 PROCEDURE — 25010000002 ONDANSETRON PER 1 MG: Performed by: ANESTHESIOLOGY

## 2020-01-08 PROCEDURE — 96366 THER/PROPH/DIAG IV INF ADDON: CPT

## 2020-01-08 PROCEDURE — 25010000002 PROPOFOL 10 MG/ML EMULSION: Performed by: ANESTHESIOLOGY

## 2020-01-08 PROCEDURE — 93005 ELECTROCARDIOGRAM TRACING: CPT | Performed by: PHYSICIAN ASSISTANT

## 2020-01-08 PROCEDURE — 25010000002 PIPERACILLIN SOD-TAZOBACTAM PER 1 G: Performed by: SURGERY

## 2020-01-08 PROCEDURE — 83690 ASSAY OF LIPASE: CPT | Performed by: SURGERY

## 2020-01-08 PROCEDURE — 25010000002 ONDANSETRON PER 1 MG: Performed by: INTERNAL MEDICINE

## 2020-01-08 PROCEDURE — 87070 CULTURE OTHR SPECIMN AEROBIC: CPT | Performed by: SURGERY

## 2020-01-08 PROCEDURE — G0378 HOSPITAL OBSERVATION PER HR: HCPCS

## 2020-01-08 PROCEDURE — 93306 TTE W/DOPPLER COMPLETE: CPT | Performed by: INTERNAL MEDICINE

## 2020-01-08 PROCEDURE — 93306 TTE W/DOPPLER COMPLETE: CPT

## 2020-01-08 PROCEDURE — 99225 PR SBSQ OBSERVATION CARE/DAY 25 MINUTES: CPT | Performed by: NURSE PRACTITIONER

## 2020-01-08 PROCEDURE — 25010000002 IOPAMIDOL 61 % SOLUTION: Performed by: SURGERY

## 2020-01-08 PROCEDURE — 63710000001 GABAPENTIN 100 MG CAPSULE: Performed by: INTERNAL MEDICINE

## 2020-01-08 PROCEDURE — 87205 SMEAR GRAM STAIN: CPT | Performed by: SURGERY

## 2020-01-08 PROCEDURE — 25010000003 LIDOCAINE 1 % SOLUTION: Performed by: ANESTHESIOLOGY

## 2020-01-08 PROCEDURE — 25010000002 HEPARIN (PORCINE) PER 1000 UNITS: Performed by: SURGERY

## 2020-01-08 PROCEDURE — 88304 TISSUE EXAM BY PATHOLOGIST: CPT | Performed by: SURGERY

## 2020-01-08 PROCEDURE — 87077 CULTURE AEROBIC IDENTIFY: CPT | Performed by: SURGERY

## 2020-01-08 PROCEDURE — 74300 X-RAY BILE DUCTS/PANCREAS: CPT

## 2020-01-08 PROCEDURE — 25010000002 NEOSTIGMINE 10 MG/10ML SOLUTION: Performed by: ANESTHESIOLOGY

## 2020-01-08 PROCEDURE — 93005 ELECTROCARDIOGRAM TRACING: CPT | Performed by: NURSE PRACTITIONER

## 2020-01-08 PROCEDURE — 25010000002 DEXAMETHASONE PER 1 MG: Performed by: ANESTHESIOLOGY

## 2020-01-08 PROCEDURE — A9270 NON-COVERED ITEM OR SERVICE: HCPCS | Performed by: INTERNAL MEDICINE

## 2020-01-08 PROCEDURE — 85027 COMPLETE CBC AUTOMATED: CPT | Performed by: NURSE PRACTITIONER

## 2020-01-08 PROCEDURE — 63710000001 FAMOTIDINE 20 MG TABLET: Performed by: ANESTHESIOLOGY

## 2020-01-08 PROCEDURE — 84132 ASSAY OF SERUM POTASSIUM: CPT | Performed by: ANESTHESIOLOGY

## 2020-01-08 PROCEDURE — 93010 ELECTROCARDIOGRAM REPORT: CPT | Performed by: INTERNAL MEDICINE

## 2020-01-08 PROCEDURE — 25010000002 PIPERACILLIN SOD-TAZOBACTAM PER 1 G: Performed by: FAMILY MEDICINE

## 2020-01-08 PROCEDURE — 99214 OFFICE O/P EST MOD 30 MIN: CPT | Performed by: INTERNAL MEDICINE

## 2020-01-08 RX ORDER — LIDOCAINE HYDROCHLORIDE 10 MG/ML
0.5 INJECTION, SOLUTION EPIDURAL; INFILTRATION; INTRACAUDAL; PERINEURAL ONCE AS NEEDED
Status: CANCELLED | OUTPATIENT
Start: 2020-01-08

## 2020-01-08 RX ORDER — ATRACURIUM BESYLATE 10 MG/ML
INJECTION, SOLUTION INTRAVENOUS AS NEEDED
Status: DISCONTINUED | OUTPATIENT
Start: 2020-01-08 | End: 2020-01-08 | Stop reason: SURG

## 2020-01-08 RX ORDER — SODIUM CHLORIDE 9 MG/ML
INJECTION, SOLUTION INTRAVENOUS AS NEEDED
Status: DISCONTINUED | OUTPATIENT
Start: 2020-01-08 | End: 2020-01-08 | Stop reason: HOSPADM

## 2020-01-08 RX ORDER — GLYCOPYRROLATE 0.2 MG/ML
INJECTION INTRAMUSCULAR; INTRAVENOUS AS NEEDED
Status: DISCONTINUED | OUTPATIENT
Start: 2020-01-08 | End: 2020-01-08 | Stop reason: SURG

## 2020-01-08 RX ORDER — PROPOFOL 10 MG/ML
VIAL (ML) INTRAVENOUS AS NEEDED
Status: DISCONTINUED | OUTPATIENT
Start: 2020-01-08 | End: 2020-01-08 | Stop reason: SURG

## 2020-01-08 RX ORDER — SODIUM CHLORIDE, SODIUM LACTATE, POTASSIUM CHLORIDE, CALCIUM CHLORIDE 600; 310; 30; 20 MG/100ML; MG/100ML; MG/100ML; MG/100ML
75 INJECTION, SOLUTION INTRAVENOUS CONTINUOUS
Status: ACTIVE | OUTPATIENT
Start: 2020-01-08 | End: 2020-01-09

## 2020-01-08 RX ORDER — FAMOTIDINE 10 MG/ML
20 INJECTION, SOLUTION INTRAVENOUS ONCE
Status: CANCELLED | OUTPATIENT
Start: 2020-01-08 | End: 2020-01-08

## 2020-01-08 RX ORDER — SODIUM CHLORIDE, SODIUM LACTATE, POTASSIUM CHLORIDE, CALCIUM CHLORIDE 600; 310; 30; 20 MG/100ML; MG/100ML; MG/100ML; MG/100ML
9 INJECTION, SOLUTION INTRAVENOUS CONTINUOUS PRN
Status: DISCONTINUED | OUTPATIENT
Start: 2020-01-08 | End: 2020-01-10 | Stop reason: SDUPTHER

## 2020-01-08 RX ORDER — ONDANSETRON 2 MG/ML
INJECTION INTRAMUSCULAR; INTRAVENOUS AS NEEDED
Status: DISCONTINUED | OUTPATIENT
Start: 2020-01-08 | End: 2020-01-08 | Stop reason: SURG

## 2020-01-08 RX ORDER — NEOSTIGMINE METHYLSULFATE 1 MG/ML
INJECTION, SOLUTION INTRAVENOUS AS NEEDED
Status: DISCONTINUED | OUTPATIENT
Start: 2020-01-08 | End: 2020-01-08 | Stop reason: SURG

## 2020-01-08 RX ORDER — SODIUM CHLORIDE 0.9 % (FLUSH) 0.9 %
10 SYRINGE (ML) INJECTION AS NEEDED
Status: CANCELLED | OUTPATIENT
Start: 2020-01-08

## 2020-01-08 RX ORDER — SODIUM CHLORIDE 0.9 % (FLUSH) 0.9 %
10 SYRINGE (ML) INJECTION EVERY 12 HOURS SCHEDULED
Status: CANCELLED | OUTPATIENT
Start: 2020-01-08

## 2020-01-08 RX ORDER — LIDOCAINE HYDROCHLORIDE 10 MG/ML
INJECTION, SOLUTION INFILTRATION; PERINEURAL AS NEEDED
Status: DISCONTINUED | OUTPATIENT
Start: 2020-01-08 | End: 2020-01-08 | Stop reason: SURG

## 2020-01-08 RX ORDER — FAMOTIDINE 20 MG/1
20 TABLET, FILM COATED ORAL ONCE
Status: CANCELLED | OUTPATIENT
Start: 2020-01-08 | End: 2020-01-08

## 2020-01-08 RX ORDER — DEXAMETHASONE SODIUM PHOSPHATE 4 MG/ML
INJECTION, SOLUTION INTRA-ARTICULAR; INTRALESIONAL; INTRAMUSCULAR; INTRAVENOUS; SOFT TISSUE AS NEEDED
Status: DISCONTINUED | OUTPATIENT
Start: 2020-01-08 | End: 2020-01-08 | Stop reason: SURG

## 2020-01-08 RX ORDER — SODIUM CHLORIDE 0.9 % (FLUSH) 0.9 %
10 SYRINGE (ML) INJECTION EVERY 12 HOURS SCHEDULED
Status: DISCONTINUED | OUTPATIENT
Start: 2020-01-08 | End: 2020-01-08 | Stop reason: HOSPADM

## 2020-01-08 RX ORDER — SODIUM CHLORIDE, SODIUM LACTATE, POTASSIUM CHLORIDE, CALCIUM CHLORIDE 600; 310; 30; 20 MG/100ML; MG/100ML; MG/100ML; MG/100ML
9 INJECTION, SOLUTION INTRAVENOUS CONTINUOUS
Status: CANCELLED | OUTPATIENT
Start: 2020-01-08

## 2020-01-08 RX ORDER — SODIUM CHLORIDE 0.9 % (FLUSH) 0.9 %
10 SYRINGE (ML) INJECTION AS NEEDED
Status: DISCONTINUED | OUTPATIENT
Start: 2020-01-08 | End: 2020-01-08 | Stop reason: HOSPADM

## 2020-01-08 RX ORDER — FAMOTIDINE 20 MG/1
20 TABLET, FILM COATED ORAL
Status: COMPLETED | OUTPATIENT
Start: 2020-01-08 | End: 2020-01-08

## 2020-01-08 RX ORDER — MAGNESIUM HYDROXIDE 1200 MG/15ML
LIQUID ORAL AS NEEDED
Status: DISCONTINUED | OUTPATIENT
Start: 2020-01-08 | End: 2020-01-08 | Stop reason: HOSPADM

## 2020-01-08 RX ORDER — SODIUM CHLORIDE, SODIUM LACTATE, POTASSIUM CHLORIDE, CALCIUM CHLORIDE 600; 310; 30; 20 MG/100ML; MG/100ML; MG/100ML; MG/100ML
INJECTION, SOLUTION INTRAVENOUS CONTINUOUS PRN
Status: DISCONTINUED | OUTPATIENT
Start: 2020-01-08 | End: 2020-01-08 | Stop reason: SURG

## 2020-01-08 RX ORDER — FENTANYL CITRATE 50 UG/ML
50 INJECTION, SOLUTION INTRAMUSCULAR; INTRAVENOUS
Status: DISCONTINUED | OUTPATIENT
Start: 2020-01-08 | End: 2020-01-08 | Stop reason: HOSPADM

## 2020-01-08 RX ORDER — FENTANYL CITRATE 50 UG/ML
INJECTION, SOLUTION INTRAMUSCULAR; INTRAVENOUS AS NEEDED
Status: DISCONTINUED | OUTPATIENT
Start: 2020-01-08 | End: 2020-01-08 | Stop reason: SURG

## 2020-01-08 RX ORDER — HYDROMORPHONE HYDROCHLORIDE 1 MG/ML
0.5 INJECTION, SOLUTION INTRAMUSCULAR; INTRAVENOUS; SUBCUTANEOUS
Status: DISCONTINUED | OUTPATIENT
Start: 2020-01-08 | End: 2020-01-08 | Stop reason: HOSPADM

## 2020-01-08 RX ORDER — BUPIVACAINE HYDROCHLORIDE 5 MG/ML
INJECTION, SOLUTION PERINEURAL AS NEEDED
Status: DISCONTINUED | OUTPATIENT
Start: 2020-01-08 | End: 2020-01-08 | Stop reason: HOSPADM

## 2020-01-08 RX ADMIN — FENTANYL CITRATE 100 MCG: 50 INJECTION, SOLUTION INTRAMUSCULAR; INTRAVENOUS at 18:00

## 2020-01-08 RX ADMIN — GLYCOPYRROLATE 0.4 MG: 0.2 INJECTION, SOLUTION INTRAMUSCULAR; INTRAVENOUS at 19:51

## 2020-01-08 RX ADMIN — PROPOFOL 100 MG: 10 INJECTION, EMULSION INTRAVENOUS at 18:02

## 2020-01-08 RX ADMIN — DEXAMETHASONE SODIUM PHOSPHATE 8 MG: 4 INJECTION, SOLUTION INTRAMUSCULAR; INTRAVENOUS at 18:05

## 2020-01-08 RX ADMIN — ATRACURIUM BESYLATE 40 MG: 10 INJECTION, SOLUTION INTRAVENOUS at 18:02

## 2020-01-08 RX ADMIN — ATRACURIUM BESYLATE 10 MG: 10 INJECTION, SOLUTION INTRAVENOUS at 19:37

## 2020-01-08 RX ADMIN — TAZOBACTAM SODIUM AND PIPERACILLIN SODIUM 3.38 G: 375; 3 INJECTION, SOLUTION INTRAVENOUS at 05:58

## 2020-01-08 RX ADMIN — TAZOBACTAM SODIUM AND PIPERACILLIN SODIUM 3.38 G: 375; 3 INJECTION, SOLUTION INTRAVENOUS at 23:56

## 2020-01-08 RX ADMIN — SODIUM CHLORIDE, PRESERVATIVE FREE 10 ML: 5 INJECTION INTRAVENOUS at 23:58

## 2020-01-08 RX ADMIN — FENTANYL CITRATE 25 MCG: 0.05 INJECTION, SOLUTION INTRAMUSCULAR; INTRAVENOUS at 20:11

## 2020-01-08 RX ADMIN — ONDANSETRON 4 MG: 2 INJECTION INTRAMUSCULAR; INTRAVENOUS at 19:46

## 2020-01-08 RX ADMIN — ONDANSETRON 4 MG: 2 INJECTION INTRAMUSCULAR; INTRAVENOUS at 21:01

## 2020-01-08 RX ADMIN — LEVOTHYROXINE SODIUM 50 MCG: 50 TABLET ORAL at 05:57

## 2020-01-08 RX ADMIN — SODIUM CHLORIDE, POTASSIUM CHLORIDE, SODIUM LACTATE AND CALCIUM CHLORIDE 75 ML/HR: 600; 310; 30; 20 INJECTION, SOLUTION INTRAVENOUS at 11:00

## 2020-01-08 RX ADMIN — METOPROLOL TARTRATE 12.5 MG: 25 TABLET, FILM COATED ORAL at 09:17

## 2020-01-08 RX ADMIN — SODIUM CHLORIDE, POTASSIUM CHLORIDE, SODIUM LACTATE AND CALCIUM CHLORIDE: 600; 310; 30; 20 INJECTION, SOLUTION INTRAVENOUS at 17:57

## 2020-01-08 RX ADMIN — TAZOBACTAM SODIUM AND PIPERACILLIN SODIUM 3.38 G: 375; 3 INJECTION, SOLUTION INTRAVENOUS at 14:37

## 2020-01-08 RX ADMIN — LIDOCAINE HYDROCHLORIDE 25 MG: 10 INJECTION, SOLUTION INFILTRATION; PERINEURAL at 18:02

## 2020-01-08 RX ADMIN — TAMSULOSIN HYDROCHLORIDE 0.8 MG: 0.4 CAPSULE ORAL at 09:17

## 2020-01-08 RX ADMIN — SODIUM CHLORIDE, POTASSIUM CHLORIDE, SODIUM LACTATE AND CALCIUM CHLORIDE 125 ML/HR: 600; 310; 30; 20 INJECTION, SOLUTION INTRAVENOUS at 05:58

## 2020-01-08 RX ADMIN — NEOSTIGMINE METHYLSULFATE 3 MG: 1 INJECTION, SOLUTION INTRAVENOUS at 19:51

## 2020-01-08 RX ADMIN — SODIUM CHLORIDE, POTASSIUM CHLORIDE, SODIUM LACTATE AND CALCIUM CHLORIDE 75 ML/HR: 600; 310; 30; 20 INJECTION, SOLUTION INTRAVENOUS at 23:56

## 2020-01-08 RX ADMIN — GABAPENTIN 200 MG: 100 CAPSULE ORAL at 23:56

## 2020-01-08 RX ADMIN — FAMOTIDINE 20 MG: 20 TABLET, FILM COATED ORAL at 16:10

## 2020-01-08 NOTE — ANESTHESIA PREPROCEDURE EVALUATION
Anesthesia Evaluation     Patient summary reviewed and Nursing notes reviewed   NPO Solid Status: > 8 hours  NPO Liquid Status: > 8 hours           Airway   Mallampati: I  TM distance: >3 FB  Neck ROM: full  No difficulty expected  Dental    (+) edentulous    Pulmonary     breath sounds clear to auscultation  (+) a smoker Former,   Cardiovascular   Exercise tolerance: poor (<4 METS)    Rhythm: regular  Rate: normal    (+) hypertension, past MI , CAD,  carotid artery disease      Neuro/Psych  (+) CVA,     GI/Hepatic/Renal/Endo    (+) obesity,   renal disease CRI, thyroid problem hypothyroidism    Musculoskeletal     Abdominal    Substance History      OB/GYN          Other   arthritis,                    Anesthesia Plan    ASA 3     general     intravenous induction     Anesthetic plan, all risks, benefits, and alternatives have been provided, discussed and informed consent has been obtained with: patient.

## 2020-01-08 NOTE — PROGRESS NOTES
[facility] Medicine Services  Clinical Decision Unit  PROGRESS NOTE    Patient Name: Miguel Angel Crane  : 1932  MRN: [id number]    Admission Date and Time: 2020  6:43 AM  Primary Care Physician: Yumiko Mata DO    Subjective   Subjective   CC:  RUQ abdominal pain    HPI:  Patient is sitting up in bed watching TV in NAD.  Patient denies any abdominal pain, Nausea, vomiting or diarrhea at this time.  Patient and wife just waiting for patient go to the OR.  No adverse events overnight.  Wife is in the room.    Review of Systems  Gen- No fevers, chills  CV- No chest pain, palpitations  Resp- No cough, dyspnea  GI- No N/V/D, abd pain  All other systems have been reviewed and the pertinent positives and negatives are listed above in the HPI or ROS    Objective   Objective   Vital Signs:   Temp:  [98 °F (36.7 °C)-99 °F (37.2 °C)] 98.4 °F (36.9 °C)  Heart Rate:  [63-95] 63  Resp:  [16-18] 16  BP: ()/(60-78) 99/62  Physical Exam:  Constitutional: Alert, WD, obese male in NAD  Eyes: EOMI, sclerae anicteric, no conjunctival injection  Head: NCAT  ENT: [place], moist mucous membranes   Respiratory: Non-labored, symmetrical chest expansion, CTAB  Cardiovascular: IRR IRR, no M/R/G, +DP pulses bilaterally  Gastrointestinal: Obese, soft, NT, ND +BS  Musculoskeletal: WARD; no LE edema bilaterally  Neurologic: Oriented x4, strength symmetric in all extremities, follows all commands, speech clear  Skin: No rashes on exposed skin  Psychiatric:Pleasant and cooperative; normal affect    Results Reviewed:  WBC 4.23  Troponin <0.010  Cr 1.33-stable  Lipase 7    Assessment/Plan   Assessment / Plan     Active Hospital Problems    Diagnosis  POA   • **Acute cholecystitis [K81.0]  Yes   • Coronary artery disease [I25.10]  Yes   • Hypertension [I10]  Yes   • CKD (chronic kidney disease), stage II [N18.2]  Yes   • Obesity (BMI 30-39.9) [E66.9]  Yes      Resolved Hospital Problems   No resolved problems  to display.     Brief course to date:  Miguel Angel Crane is a 87 y.o. male who presented with PMHx of HTN, hx of CVA, stable CAD/RAQUEL, hypothyroidism, BPH, and biliary colic presented to [facility] ED with 9/10 aching RUQ pain unrelieved by his home chronic pain medication for ~24-48hrs.  Has had similar pain for ~1 yr but over past 2 months has escalated in frequency and duration over time.     Plan:  Acute cholecystitis  --HIDA scan positive  --To OR with Malik this afternoon for lap sunil  --NPO  --Cardiology F/u at discharge for A-fib    Discharge Blueprint:   1. Presumably will be CCY candidate (hopefully for tomorrow 1/8/20) and would be appropriate for d/c post-op when agreeable with Dr. Gan  2. New onset A-fib F/U after Dc; Malik aware and not concerned  3. Tolerating adequate PO  4. Ambulating independently or near his baseline  5. Pain controlled (takes chronic opiates so may marcio increased regimen for acute pain post-op)    Electronically signed by KERRI Malave, 01/08/20, 7:42 AM.

## 2020-01-08 NOTE — PROGRESS NOTES
Chart reviewed along with today's laboratory examination.  I discussed the risks and benefits of cholecystectomy in the face of his symptoms again with the patient and his family.  Our discussion included, but was not limited to: Bleeding, infection, injury to adjacent viscera (common bile duct, duodenum, colon, liver etc.), bile leak, retained stones, need for ERCP, postoperative abscess formation, need for re-intervention, an open operation in general, and medical issues from a cardiopulmonary and deep venous thrombosis standpoint.  They understand and wish to proceed.

## 2020-01-08 NOTE — ANESTHESIA PROCEDURE NOTES
Airway  Urgency: elective    Date/Time: 1/8/2020 6:03 PM  End Time:1/8/2020 6:09 PM  Airway not difficult    General Information and Staff    Patient location during procedure: OR  Anesthesiologist: León Tavares MD    Indications and Patient Condition  Indications for airway management: airway protection    Preoxygenated: yes  MILS not maintained throughout  Mask difficulty assessment: 1 - vent by mask    Final Airway Details  Final airway type: endotracheal airway      Successful airway: ETT  Cuffed: yes   Successful intubation technique: direct laryngoscopy  Endotracheal tube insertion site: oral  Blade: Raoul  Blade size: 3  ETT size (mm): 7.5  Cormack-Lehane Classification: grade I - full view of glottis  Placement verified by: chest auscultation and capnometry   Measured from: lips  ETT/EBT  to lips (cm): 20  Number of attempts at approach: 1    Additional Comments  Negative epigastric sounds, Breath sound equal bilaterally with symmetric chest rise and fall

## 2020-01-08 NOTE — PLAN OF CARE
Pt has rested during the night, pt becomes very irritable when his sleep is interrupted for care, pt complained of pain but refused pain medication, VSS on 2L, Afib on the monitor, wife at bedside is very attentive to patient, CHG baths completed and ready for Cholecystectomy today. Pt has remained NPO since midnight.

## 2020-01-08 NOTE — CONSULTS
Clinical Nutrition   Reason For Visit: Identified at risk by screening criteria, MST score 2+, Unintentional weight loss    Patient Name: Miguel Angel Crane  YOB: 1932  MRN: [id number]  Date of Encounter: 01/08/20 1:28 PM  Admission date: 1/7/2020      Nutrition Assessment     Admission Problem List:  Abdominal pain  N/V x 2  Biliary colic  PAF (new onset)  TAZ on CKD  Acute cholecystitis      Applicable PMH:  HTN  CVA  RAQUEL/CAD  BPH  Biliary colic  Pancreatic mass  Chronic constipation 2/2 chronic pain medication  MI  CKD stage 2      Applicable medical tests/procedures since admission:  (1/8) - CCY planned       Reported/Observed/Food/Nutrition Related History   Patient and wife present during visit. Wife reports patient has been eating <50% of his usual PO intake during the past few weeks. Eats mostly soft foods. Abdominal pain has been going on for 1 year, but worse during the past 2 months. Resulting unintentional weight loss. Patient states he weighed 260 lbs a couple weeks ago. Patient states last solid food intake was Monday (1/6). Denies food allergies and difficulty chewing/swallowing. Declines oral nutrition supplements.    Anthropometrics   Height: 71 in  Weight: 249 lbs (bed scale weight 1/7 per ns doc)  BMI: 34.8  BMI classification: Obese Class I: 30-34.9kg/m2   IBW: 172 lbs    UBW: 260 lbs (standing wt at home couple of week prior to admission per patient)  Weight change: Unintentional weight loss of 11 lbs (4.2%) x 2 weeks.      Labs reviewed   Labs reviewed: Yes    Medications reviewed   Medications reviewed: Yes  Pertinent: zosyn    Current Nutrition Prescription   PO: NPO Diet    Evaluation of Received Nutrient/Fluid Intake: insufficient data      Nutrition Diagnosis     Problem Unintended weight loss   Etiology N/V, abdominal pain, decreased appetite   Signs/Symptoms Unintentional weight loss of 11 lbs (4.2%) x 2 weeks       Problem Inadequate oral intake   Etiology N/V, abdominal  pain, decreased appetite   Signs/Symptoms Patient's wife reports <50% of usual PO intake x 3 weeks       Intervention   Intervention: Follow treatment progress, Care plan reviewed, Await begin PO, Supplement offered/refused    Goal:   General: Nutrition support treatment  PO: Initiate diet as medically appropriate     Monitoring/Evaluation:       Monitoring/Evaluation: Per protocol, PO intake, Weight, Symptoms  Will Continue to follow per protocol  Liset De Jesus RD  Time Spent: 30 min

## 2020-01-09 ENCOUNTER — APPOINTMENT (OUTPATIENT)
Dept: GENERAL RADIOLOGY | Facility: HOSPITAL | Age: 85
End: 2020-01-09

## 2020-01-09 ENCOUNTER — ANESTHESIA EVENT (OUTPATIENT)
Dept: GASTROENTEROLOGY | Facility: HOSPITAL | Age: 85
End: 2020-01-09

## 2020-01-09 ENCOUNTER — ANESTHESIA (OUTPATIENT)
Dept: GASTROENTEROLOGY | Facility: HOSPITAL | Age: 85
End: 2020-01-09

## 2020-01-09 PROBLEM — I48.0 PAF (PAROXYSMAL ATRIAL FIBRILLATION): Status: ACTIVE | Noted: 2020-01-09

## 2020-01-09 PROBLEM — K80.50 CHOLEDOCHOLITHIASIS: Status: ACTIVE | Noted: 2020-01-09

## 2020-01-09 LAB
ALBUMIN SERPL-MCNC: 4 G/DL (ref 3.5–5.2)
ALBUMIN/GLOB SERPL: 1.7 G/DL
ALP SERPL-CCNC: 62 U/L (ref 39–117)
ALT SERPL W P-5'-P-CCNC: 18 U/L (ref 1–41)
ANION GAP SERPL CALCULATED.3IONS-SCNC: 15 MMOL/L (ref 5–15)
AST SERPL-CCNC: 34 U/L (ref 1–40)
BILIRUB SERPL-MCNC: 0.9 MG/DL (ref 0.2–1.2)
BUN BLD-MCNC: 15 MG/DL (ref 8–23)
BUN/CREAT SERPL: 10.9 (ref 7–25)
CALCIUM SPEC-SCNC: 8.9 MG/DL (ref 8.6–10.5)
CHLORIDE SERPL-SCNC: 100 MMOL/L (ref 98–107)
CO2 SERPL-SCNC: 23 MMOL/L (ref 22–29)
CREAT BLD-MCNC: 1.37 MG/DL (ref 0.76–1.27)
GFR SERPL CREATININE-BSD FRML MDRD: 49 ML/MIN/1.73
GLOBULIN UR ELPH-MCNC: 2.3 GM/DL
GLUCOSE BLD-MCNC: 130 MG/DL (ref 65–99)
POTASSIUM BLD-SCNC: 4.4 MMOL/L (ref 3.5–5.2)
PROT SERPL-MCNC: 6.3 G/DL (ref 6–8.5)
SODIUM BLD-SCNC: 138 MMOL/L (ref 136–145)

## 2020-01-09 PROCEDURE — 25010000002 ONDANSETRON PER 1 MG: Performed by: INTERNAL MEDICINE

## 2020-01-09 PROCEDURE — 99214 OFFICE O/P EST MOD 30 MIN: CPT | Performed by: INTERNAL MEDICINE

## 2020-01-09 PROCEDURE — G0378 HOSPITAL OBSERVATION PER HR: HCPCS

## 2020-01-09 PROCEDURE — A9270 NON-COVERED ITEM OR SERVICE: HCPCS | Performed by: INTERNAL MEDICINE

## 2020-01-09 PROCEDURE — 63710000001 OXYCODONE-ACETAMINOPHEN 10-325 MG TABLET: Performed by: INTERNAL MEDICINE

## 2020-01-09 PROCEDURE — 74330 X-RAY BILE/PANC ENDOSCOPY: CPT

## 2020-01-09 PROCEDURE — 63710000001 ALUMINUM-MAGNESIUM HYDROXIDE-SIMETHICONE 400-400-40 MG/5ML SUSPENSION: Performed by: INTERNAL MEDICINE

## 2020-01-09 PROCEDURE — C1769 GUIDE WIRE: HCPCS | Performed by: INTERNAL MEDICINE

## 2020-01-09 PROCEDURE — 43262 ENDO CHOLANGIOPANCREATOGRAPH: CPT | Performed by: INTERNAL MEDICINE

## 2020-01-09 PROCEDURE — 99226 PR SBSQ OBSERVATION CARE/DAY 35 MINUTES: CPT | Performed by: FAMILY MEDICINE

## 2020-01-09 PROCEDURE — 25010000002 HYDROMORPHONE 1 MG/ML SOLUTION: Performed by: SURGERY

## 2020-01-09 PROCEDURE — 25010000002 HYDROMORPHONE 1 MG/ML SOLUTION: Performed by: INTERNAL MEDICINE

## 2020-01-09 PROCEDURE — 99213 OFFICE O/P EST LOW 20 MIN: CPT | Performed by: INTERNAL MEDICINE

## 2020-01-09 PROCEDURE — 80053 COMPREHEN METABOLIC PANEL: CPT | Performed by: SURGERY

## 2020-01-09 PROCEDURE — 63710000001 AMOXICILLIN-CLAVULANATE 875-125 MG TABLET: Performed by: INTERNAL MEDICINE

## 2020-01-09 PROCEDURE — 25010000002 ONDANSETRON PER 1 MG: Performed by: SURGERY

## 2020-01-09 PROCEDURE — 25010000002 PIPERACILLIN SOD-TAZOBACTAM PER 1 G: Performed by: SURGERY

## 2020-01-09 PROCEDURE — 63710000001 LEVOTHYROXINE 50 MCG TABLET: Performed by: INTERNAL MEDICINE

## 2020-01-09 PROCEDURE — 63710000001 TAMSULOSIN 0.4 MG CAPSULE: Performed by: INTERNAL MEDICINE

## 2020-01-09 PROCEDURE — 43264 ERCP REMOVE DUCT CALCULI: CPT | Performed by: INTERNAL MEDICINE

## 2020-01-09 PROCEDURE — 25010000002 PROPOFOL 10 MG/ML EMULSION: Performed by: ANESTHESIOLOGY

## 2020-01-09 PROCEDURE — 63710000001 GABAPENTIN 100 MG CAPSULE: Performed by: INTERNAL MEDICINE

## 2020-01-09 RX ORDER — LIDOCAINE HYDROCHLORIDE 10 MG/ML
0.5 INJECTION, SOLUTION EPIDURAL; INFILTRATION; INTRACAUDAL; PERINEURAL ONCE AS NEEDED
Status: DISCONTINUED | OUTPATIENT
Start: 2020-01-09 | End: 2020-01-09 | Stop reason: HOSPADM

## 2020-01-09 RX ORDER — SODIUM CHLORIDE 0.9 % (FLUSH) 0.9 %
10 SYRINGE (ML) INJECTION AS NEEDED
Status: DISCONTINUED | OUTPATIENT
Start: 2020-01-09 | End: 2020-01-09 | Stop reason: HOSPADM

## 2020-01-09 RX ORDER — PROPOFOL 10 MG/ML
VIAL (ML) INTRAVENOUS AS NEEDED
Status: DISCONTINUED | OUTPATIENT
Start: 2020-01-09 | End: 2020-01-09 | Stop reason: SURG

## 2020-01-09 RX ORDER — FAMOTIDINE 20 MG/1
20 TABLET, FILM COATED ORAL
Status: DISCONTINUED | OUTPATIENT
Start: 2020-01-09 | End: 2020-01-09

## 2020-01-09 RX ORDER — PROMETHAZINE HYDROCHLORIDE 25 MG/1
25 SUPPOSITORY RECTAL ONCE AS NEEDED
Status: DISCONTINUED | OUTPATIENT
Start: 2020-01-09 | End: 2020-01-09 | Stop reason: HOSPADM

## 2020-01-09 RX ORDER — PROMETHAZINE HYDROCHLORIDE 25 MG/ML
6.25 INJECTION, SOLUTION INTRAMUSCULAR; INTRAVENOUS ONCE AS NEEDED
Status: DISCONTINUED | OUTPATIENT
Start: 2020-01-09 | End: 2020-01-09 | Stop reason: HOSPADM

## 2020-01-09 RX ORDER — AMOXICILLIN AND CLAVULANATE POTASSIUM 875; 125 MG/1; MG/1
1 TABLET, FILM COATED ORAL EVERY 12 HOURS SCHEDULED
Status: DISCONTINUED | OUTPATIENT
Start: 2020-01-09 | End: 2020-01-11 | Stop reason: HOSPADM

## 2020-01-09 RX ORDER — SODIUM CHLORIDE 9 MG/ML
10 INJECTION, SOLUTION INTRAVENOUS CONTINUOUS
Status: DISCONTINUED | OUTPATIENT
Start: 2020-01-09 | End: 2020-01-11 | Stop reason: HOSPADM

## 2020-01-09 RX ORDER — FENTANYL CITRATE 50 UG/ML
50 INJECTION, SOLUTION INTRAMUSCULAR; INTRAVENOUS
Status: DISCONTINUED | OUTPATIENT
Start: 2020-01-09 | End: 2020-01-09 | Stop reason: HOSPADM

## 2020-01-09 RX ORDER — PROMETHAZINE HYDROCHLORIDE 25 MG/1
25 TABLET ORAL ONCE AS NEEDED
Status: DISCONTINUED | OUTPATIENT
Start: 2020-01-09 | End: 2020-01-09 | Stop reason: HOSPADM

## 2020-01-09 RX ORDER — SODIUM CHLORIDE, SODIUM LACTATE, POTASSIUM CHLORIDE, CALCIUM CHLORIDE 600; 310; 30; 20 MG/100ML; MG/100ML; MG/100ML; MG/100ML
9 INJECTION, SOLUTION INTRAVENOUS CONTINUOUS PRN
Status: DISCONTINUED | OUTPATIENT
Start: 2020-01-09 | End: 2020-01-11 | Stop reason: HOSPADM

## 2020-01-09 RX ORDER — FAMOTIDINE 10 MG/ML
20 INJECTION, SOLUTION INTRAVENOUS ONCE
Status: COMPLETED | OUTPATIENT
Start: 2020-01-09 | End: 2020-01-09

## 2020-01-09 RX ORDER — PROPOFOL 10 MG/ML
VIAL (ML) INTRAVENOUS CONTINUOUS PRN
Status: DISCONTINUED | OUTPATIENT
Start: 2020-01-09 | End: 2020-01-09 | Stop reason: SURG

## 2020-01-09 RX ORDER — ONDANSETRON 2 MG/ML
4 INJECTION INTRAMUSCULAR; INTRAVENOUS ONCE AS NEEDED
Status: DISCONTINUED | OUTPATIENT
Start: 2020-01-09 | End: 2020-01-09 | Stop reason: HOSPADM

## 2020-01-09 RX ORDER — SODIUM CHLORIDE 0.9 % (FLUSH) 0.9 %
10 SYRINGE (ML) INJECTION EVERY 12 HOURS SCHEDULED
Status: DISCONTINUED | OUTPATIENT
Start: 2020-01-09 | End: 2020-01-09 | Stop reason: HOSPADM

## 2020-01-09 RX ADMIN — FAMOTIDINE 20 MG: 10 INJECTION INTRAVENOUS at 15:37

## 2020-01-09 RX ADMIN — TAZOBACTAM SODIUM AND PIPERACILLIN SODIUM 3.38 G: 375; 3 INJECTION, SOLUTION INTRAVENOUS at 08:23

## 2020-01-09 RX ADMIN — GABAPENTIN 200 MG: 100 CAPSULE ORAL at 22:00

## 2020-01-09 RX ADMIN — PROPOFOL 75 MCG/KG/MIN: 10 INJECTION, EMULSION INTRAVENOUS at 16:19

## 2020-01-09 RX ADMIN — ALUMINUM HYDROXIDE, MAGNESIUM HYDROXIDE, AND DIMETHICONE 15 ML: 400; 400; 40 SUSPENSION ORAL at 08:16

## 2020-01-09 RX ADMIN — SODIUM CHLORIDE: 9 INJECTION, SOLUTION INTRAVENOUS at 16:11

## 2020-01-09 RX ADMIN — AMOXICILLIN AND CLAVULANATE POTASSIUM 1 TABLET: 875; 125 TABLET, FILM COATED ORAL at 22:00

## 2020-01-09 RX ADMIN — SODIUM CHLORIDE 10 ML/HR: 9 INJECTION, SOLUTION INTRAVENOUS at 22:22

## 2020-01-09 RX ADMIN — SODIUM CHLORIDE 10 ML/HR: 9 INJECTION, SOLUTION INTRAVENOUS at 15:37

## 2020-01-09 RX ADMIN — ONDANSETRON 4 MG: 2 INJECTION INTRAMUSCULAR; INTRAVENOUS at 08:33

## 2020-01-09 RX ADMIN — TAMSULOSIN HYDROCHLORIDE 0.8 MG: 0.4 CAPSULE ORAL at 08:17

## 2020-01-09 RX ADMIN — SODIUM CHLORIDE, PRESERVATIVE FREE 10 ML: 5 INJECTION INTRAVENOUS at 22:25

## 2020-01-09 RX ADMIN — PROPOFOL 50 MG: 10 INJECTION, EMULSION INTRAVENOUS at 16:19

## 2020-01-09 RX ADMIN — OXYCODONE HYDROCHLORIDE AND ACETAMINOPHEN 1 TABLET: 10; 325 TABLET ORAL at 03:56

## 2020-01-09 RX ADMIN — LEVOTHYROXINE SODIUM 50 MCG: 50 TABLET ORAL at 06:32

## 2020-01-09 RX ADMIN — HYDROMORPHONE HYDROCHLORIDE 1 MG: 1 INJECTION, SOLUTION INTRAMUSCULAR; INTRAVENOUS; SUBCUTANEOUS at 22:22

## 2020-01-09 RX ADMIN — OXYCODONE HYDROCHLORIDE AND ACETAMINOPHEN 1 TABLET: 10; 325 TABLET ORAL at 08:17

## 2020-01-09 RX ADMIN — HYDROMORPHONE HYDROCHLORIDE 1 MG: 1 INJECTION, SOLUTION INTRAMUSCULAR; INTRAVENOUS; SUBCUTANEOUS at 11:38

## 2020-01-09 NOTE — OP NOTE
General Surgery Operative Note    Miguel Angel Crane  [id number]  4/9/1932    Date of Surgery:  1/8/2020 7:57 PM    Pre-op Diagnosis: Acute on chronic calculus cholecystitis    Post-op Diagnosis: Acute on chronic calculus cholecystitis    Procedure: Laparoscopic cholecystectomy    Surgeon: Arnaldo Gan MD    Assistant: None    Anesthesia: General    Fluids: 3500 mL of crystalloid    Estimated Blood Loss: Less than 50 mL    Urine Voided: Not recorded     Specimens:  Gallbladder                  Drains: 19 Armenian Lm gallbladder fossa    Findings and interpretation intraoperative cholangiogram:    Cannulation of the cystic duct noted.  Noted likely evidence of distal common  bile duct stones.    Complications: None apparent    History: 87-year-old gentleman presents with several month history of right upper quadrant abdominal pain which worsened over the last 2 weeks and became severe over the last 2 days.  Work-up demonstrated gallbladder inflammation with normal liver function tests.       I rehashed the risks and benefits of cholecystectomy, including but not limited to: bleeding, infection, injury to adjacent viscera (small bowel, large bowel, duodenum, common bile duct, hepatic artery, the liver), bile leak, retained stones, need for ERCP, an open operation and general, and medical issues from a cardiopulmonary venous thrombosis standpoint.    Procedure:      After informed consent the patient was taken to the operating room.  They were placed in the supine position on the operating table, general anesthesia administered, and the abdomen was then prepped and draped in the standard sterile fashion. A timeout was performed.      The operation began with a periumbilical Anmol trocar cutdown.  The skin was anesthetized and incised, the peritoneum entered sharply under direct visualization, bilateral 0 Vicryl fascial sutures were placed, and the blunt-tipped Anmol trocar was placed intra-abdominally.  The  abdomen was insufflated and the patient was placed in the head up position and rolled slightly onto the left hand side.  I then placed a subxiphoid port and two 5 mm trocars in the right upper quadrant under direct visualization.     The gallbladder was not visible secondary to omentum surrounding the gallbladder and densely adherent to the liver edge.  This was taken down sequentially with the [place] LigaSure.  The gallbladder appeared chronically scarred and was socked into place. The fundus of the gallbladder was grasped and retracted cephalad up over the liver edge with difficulty.  The gallbladder tore releasing clearly purulent material.  Slow difficult meticulous dissection allowed identification of what appeared to be the neck of the gallbladder.  The neck of the gallbladder was then retracted inferior laterally.  The peritoneal attachments to the cystic duct and cystic artery were stripped down and a meticulous dissection demonstrated what appeared to be the critical view.  I felt a cholangiogram was appropriate.  I then placed 1 clip proximally on the cystic duct and created a ductotomy with scissors.  I introduced the cholangiogram catheter in place.  A fluoroscopy study was performed demonstrating a patent common bile duct with stones, cannulation of the cystic duct, demonstration of the right and left hepatic ducts.  I then placed 2 clips proximally and one distally on the cystic duct, 2 clips proximally on the cystic artery and one distally.  The laparoscopic scissors were then used to transect these structures and removed the gallbladder from the gallbladder bed.  I placed the gallbladder into an Endo Catch bag and withdrew this from the abdomen.  I reinspected my clips, they were without bile leak or bleeding and the gallbladder fossa was similar.  A 19 Swiss Lm drain was placed in the gallbladder fossa and brought out the lateral 5 mm port.  This was sewn into place with a 2-0 nylon.  I  inspected my umbilical port site and this was without obvious complication.  All trocars were removed under direct visualization without bleeding.  The periumbilical fascial defect was closed with 0 Vicryl.  All skin incisions were closed with 4-0 Monocryl, Mastisol, Steri-Strips, Telfa, and Tegaderms.       The lap and needle count was correct at the end of the procedure ×2. The patient was then transferred to the recovery room in stable condition.   Arnaldo Gan MD     Date: 1/8/2020  Time: 7:57 PM

## 2020-01-09 NOTE — PLAN OF CARE
Problem: Patient Care Overview  Goal: Plan of Care Review  Outcome: Ongoing (interventions implemented as appropriate)  Flowsheets (Taken 1/8/2020 1944)  Progress: no change  Plan of Care Reviewed With: patient  Outcome Summary: VSS; cardiology consulted and cleared pt for surgery;  Goal: Individualization and Mutuality  Outcome: Ongoing (interventions implemented as appropriate)  Goal: Discharge Needs Assessment  Outcome: Ongoing (interventions implemented as appropriate)  Goal: Interprofessional Rounds/Family Conf  Outcome: Ongoing (interventions implemented as appropriate)     Problem: Pain, Acute (Adult)  Goal: Identify Related Risk Factors and Signs and Symptoms  Outcome: Ongoing (interventions implemented as appropriate)  Goal: Acceptable Pain Control/Comfort Level  Outcome: Ongoing (interventions implemented as appropriate)     Problem: Fall Risk (Adult)  Goal: Identify Related Risk Factors and Signs and Symptoms  Outcome: Ongoing (interventions implemented as appropriate)  Goal: Absence of Fall  Outcome: Ongoing (interventions implemented as appropriate)     Problem: Pain, Chronic (Adult)  Goal: Identify Related Risk Factors and Signs and Symptoms  Outcome: Ongoing (interventions implemented as appropriate)  Goal: Acceptable Pain/Comfort Level and Functional Ability  Outcome: Ongoing (interventions implemented as appropriate)

## 2020-01-09 NOTE — PROGRESS NOTES
[facility] at [facility]  IP Progress Note      Chief Complaint/Reason for service: #1 PAF    Subjective   Subjective: The patient is awake and alert.  He is complaining of some nausea and some abdominal pain.  He was seen by Dr. Doll who plans to do a procedure on him.    Past medical, surgical, social and family history reviewed in the patient's electronic medical record.    Objective     Vital Sign Min/Max for last 24 hours  Temp  Min: 97.8 °F (36.6 °C)  Max: 98.7 °F (37.1 °C)   BP  Min: 106/72  Max: 140/75   Pulse  Min: 55  Max: 77   Resp  Min: 16  Max: 18   SpO2  Min: 93 %  Max: 97 %   Flow (L/min)  Min: 2  Max: 2      Intake/Output Summary (Last 24 hours) at 1/9/2020 0804  Last data filed at 1/9/2020 0518  Gross per 24 hour   Intake 50 ml   Output 275 ml   Net -225 ml             Current Facility-Administered Medications:   •  acetaminophen (TYLENOL) tablet 650 mg, 650 mg, Oral, Q4H PRN, Arnaldo Gan MD  •  aluminum-magnesium hydroxide-simethicone (MAALOX MAX) 400-400-40 MG/5ML suspension 15 mL, 15 mL, Oral, Q6H PRN, Arnaldo Gan MD  •  diphenhydrAMINE (BENADRYL) capsule 25 mg, 25 mg, Oral, Nightly PRN, Arnaldo Gan MD  •  gabapentin (NEURONTIN) capsule 200 mg, 200 mg, Oral, Nightly, Arnaldo Gan MD, 200 mg at 01/08/20 1866  •  HYDROmorphone (DILAUDID) injection 1 mg, 1 mg, Intravenous, Q4H PRN **AND** naloxone (NARCAN) injection 0.4 mg, 0.4 mg, Intravenous, Q5 Min PRN, Arnaldo Gan MD  •  lactated ringers infusion, 75 mL/hr, Intravenous, Continuous, Arnaldo Gan MD, Stopped at 01/09/20 0557  •  lactated ringers infusion, 9 mL/hr, Intravenous, Continuous PRN, Arnaldo Gan MD  •  levothyroxine (SYNTHROID, LEVOTHROID) tablet 50 mcg, 50 mcg, Oral, Q AM, Arnaldo Gan MD, 50 mcg at 01/09/20 0632  •  ondansetron (ZOFRAN) injection 4 mg, 4 mg, Intravenous, Q6H PRN, Arnaldo Gan MD, 4 mg at  01/08/20 2101  •  oxyCODONE-acetaminophen (PERCOCET)  MG per tablet 1 tablet, 1 tablet, Oral, Q4H PRN, Arnaldo Gan MD, 1 tablet at 01/09/20 0356  •  piperacillin-tazobactam (ZOSYN) 3.375 g in iso-osmotic dextrose 50 ml (premix), 3.375 g, Intravenous, Q8H, Arnaldo Gan MD, Stopped at 01/09/20 0400  •  sodium chloride 0.9 % flush 10 mL, 10 mL, Intravenous, PRN, Arnaldo Gan MD  •  sodium chloride 0.9 % flush 10 mL, 10 mL, Intravenous, Q12H, Arnaldo Gan MD, 10 mL at 01/08/20 2358  •  sodium chloride 0.9 % flush 10 mL, 10 mL, Intravenous, PRN, Arnaldo Gan MD  •  tamsulosin (FLOMAX) 24 hr capsule 0.8 mg, 0.8 mg, Oral, Daily, Arnaldo Gan MD, 0.8 mg at 01/08/20 0917    Physical Exam: General obese male lying in bed at a 60 degree angle not dyspneic not tachypneic no acute distress current heart rate 82        HEENT: No JVP       Respiratory: Equal bilateral symmetrical expansion reduced breath sounds at the bases       Cardiovascular: Regular rate and rhythm without a murmur gallop or click and no peripheral edema to palpation               Neuro: Facial expressions are symmetrical moves all 4 extremities       Skin: Warm and dry with no edema to palpation       Psych: Pleasant affect    Results Review: Vital signs are stable.  Creatinine is 1.37.  Patient is afebrile    Radiology Results:  Imaging Results (Last 72 Hours)     Procedure Component Value Units Date/Time    FL Cholangiogram Operative [[id number]] Resulted:  01/08/20 1859     Updated:  01/08/20 1933    US Gallbladder [[id number]] Collected:  01/07/20 1029     Updated:  01/08/20 0926    Narrative:       EXAMINATION: US GALLBLADDER- 01/07/2020     INDICATION: RUQ pain     TECHNIQUE: Grayscale and color Doppler ultrasonographic imaging of the  gallbladder and right upper quadrant was performed.     COMPARISON: Gallbladder ultrasound 11/05/2019, CT abdomen and pelvis  01/07/2020.      FINDINGS: Examination is somewhat limited secondary to patient body  habitus and adjacent bowel gas artifact.     Visualized portions of the pancreas do not demonstrate obvious  abnormality although are limited secondary to adjacent bowel gas  artifact. Identified within the mid body of the pancreas is a hypoechoic  area measuring 5.2 x 3.0 x 3.6 cm which appears partially cystic and  likely relates to the lesion seen on the accompanying CT abdomen and  pelvis performed 01/07/2020 and may be overestimated in size on this  ultrasound exam. No peripancreatic fluid identified.     The liver appears homogeneous without evidence for hepatic mass or  lesion identified. No perihepatic fluid noted. Please note that patient  body habitus limits evaluation of the liver specifically.     The gallbladder demonstrate multiple echogenic foci with posterior  acoustic shadowing and gallbladder debris most consistent with  cholelithiasis. The gallbladder wall is thickened measuring up to 7 mm  in thickness. No pericholecystic fluid is identified. The common bile  duct measures 5 mm in size, not unexpected in this patient's age of 87.     The right kidney measures 9.7 x 4.4 x 4.1 cm. No solid renal mass or  hydronephrosis appreciated. There is loss of normal cortical medullary  differentiation likely related to chronic medical renal disease similar  to prior.       Impression:       1. Cholelithiasis with interval development of gallbladder wall  thickening measuring up to 7 mm, new from 11/05/2019. No pericholecystic  fluid identified.     2. Cystic lesion involving the mid body of the pancreas as previously  noted on prior CT abdomen and pelvis examinations which may be  overestimated in size on this ultrasound exam measuring up to possibly  5.2 cm.     D:  01/07/2020  E:  01/07/2020                     This report was finalized on 1/8/2020 9:22 AM by Dr. Jaxon Smart MD.       CT Abdomen Pelvis With Contrast [[id number]]  Collected:  01/07/20 0845     Updated:  01/08/20 0918    Narrative:       EXAMINATION: CT ABDOMEN PELVIS W CONTRAST- 01/07/2020     INDICATION: Abdominal pain     TECHNIQUE: Contrast enhanced CT imaging of the abdomen and pelvis was  performed with additional coronal and sagittal reformatted imaging  provided for review. Additional delayed imaging of the abdomen and  pelvis in the axial plane only was submitted for review.     The radiation dose reduction device was turned on for each scan per the  ALARA (As Low as Reasonably Achievable) protocol.     COMPARISON: CT abdomen and pelvis 11/05/2019     FINDINGS: The visualized lower lungs demonstrate nonspecific bilateral  pleural calcifications as previously seen. The liver is low in  attenuation suggestive of a mild hepatic steatosis. The portal system  appears patent as visualized. The gallbladder demonstrates wall  thickening of the gallbladder apex measuring up to 1 cm without  pericholecystic inflammation appreciated by CT criteria. Questionable  cholelithiasis identified. The spleen is unremarkable in appearance. The  adrenal glands do not demonstrate abnormality. Again identified within  the mid body of the pancreas is a 2.5 x 2.9 cm hypoattenuated lesion  persisting from 11/05/2019 in which pancreatic neoplasm is not excluded  and requires further follow-up if clinically appropriate. The kidneys  are atrophic similar to prior. There is again identified a 4.5 cm left  renal cyst. Additional smaller subcentimeter cystic regions are  involving the left kidney which are too small to accurately characterize  but statistically represent small renal cysts. Ureters demonstrate  normal course and caliber without evidence for obstructive uropathy. The  previously noted left-sided renal calculus is no longer visualized. The  urinary bladder is partially decompressed. The prostate gland is  heterogenous with coarse calcification and enlarged measuring up to 5.5  cm. No  pelvic mass identified. Scattered calcifications are noted in the  lower pelvis, similar to prior. No free fluid identified.  Atherosclerotic calcified and noncalcified disease of the abdominal  aorta and its branches are noted similar to prior.     No free air is identified. The stomach is partially decompressed and  otherwise unremarkable similar to prior exam. There is low-attenuation  gastric fold prominence which could relate to mild gastric edema in the  appropriate clinical setting. The small bowel is nondilated. No evidence  of bowel obstruction appreciated. The appendix appears normal. The colon  demonstrates scattered diverticular disease most pronounced involving  the sigmoid colon without convincing evidence of pericolonic  inflammation or diverticulitis. The distal colon is decompressed,  similar to prior. Surrounding soft tissues do not reveal acute  abnormality. Delayed imaging of the abdomen and pelvis demonstrates  contrast within the ureters and urinary bladder without extravasation.  Small fat filled left inguinal hernia noted. The visualized bony pelvis  appears intact. Thoracolumbar degenerative changes are identified  without aggressive features. Multilevel posterior disc osteophyte  complexes are identified similar to prior.       Impression:       No acute intra-abdominal or pelvic process is appreciated.  Specifically, the previously noted obstructing right renal calculus has  been removed/resolved. No evidence of new hydronephrosis or obstructive  uropathy appreciated. There is a punctate not affecting left renal  calculus noted.      POTENTIALLY CLINICALLY SIGNIFICANT INCIDENTAL FINDINGS:     - Focal wall thickening of the gallbladder identified without  gallbladder distention or CT evidence for pericholecystic inflammation.  Correlate clinically.     - Mild gastric fold prominence which is a nonspecific finding can be  seen in gastritis. Consider further evaluation with endoscopy  if  clinically appropriate.     -Redemonstration of a 2.9 cm hypoattenuated lesion within the mid body  of the pancreas, similar to 11/05/2019 in which underlying pancreatic  neoplasm is not excluded. If clinically appropriate follow-up CT or MRI  of the abdomen with IV contrast (pancreatic mass protocol) can further  evaluate.     -Findings suggestive of early hepatic steatosis.     -Diverticulosis without CT evidence of diverticulitis.     - Heavy calcified and noncalcified atherosclerotic disease of the  abdominal aorta and its branches with focal ectasia of the right common  iliac artery measuring up to 2.1 cm, similar to 11/05/2019.     D:  01/07/2020  E:  01/07/2020     This report was finalized on 1/8/2020 9:15 AM by Dr. Jaxon Smart MD.       NM HIDA Scan With Pharmacological Intervention [[id number]] Collected:  01/07/20 1619     Updated:  01/08/20 0843    Narrative:       EXAMINATION: NM HIDA SCAN WITH PHARMACOLOGICAL INTERVENTION- 01/07/2020     INDICATION: RUQ pain, no fever, no elevated WBC     TECHNIQUE: Patient was injected with 7.68 mCi of technetium choletec.  Images were obtained over a 60 minute duration.     COMPARISON: NONE     FINDINGS: There is initially uniform activity in the liver. No activity  is identified within the gallbladder. Subsequently the patient was given  intravenous morphine sulfate and imaged for an additional 30 minutes,  again showing no gallbladder activity.       Impression:       There is no isotope in the gallbladder initially nor  following intravenous morphine sulfate. These findings are consistent  with acute or chronic cholecystitis.     D:  01/07/2020  E:  01/07/2020         This report was finalized on 1/8/2020 8:40 AM by Dr. León Lizama MD.       XR Chest 1 View [[id number]] Collected:  01/07/20 0741     Updated:  01/07/20 0743    Narrative:       CR Chest 1 Vw    INDICATION:   Upper abdominal pain. Right-sided upper abdominal pain for 2 weeks     COMPARISON:     Chest x-ray 11/9/2019.    FINDINGS:  Single portable AP view(s) of the chest.  Cardiac silhouette size is top normal. There are calcified pleural plaques best appreciated on the left. Please correlate for history of asbestos exposure area there is a small amount of bibasilar atelectasis or  scarring unchanged on the right and unchanged to slightly worse on the left. No pleural effusion or pneumothorax. No congestive failure. Allowing for the underlying pleural plaque, no definite acute infiltrate.      Impression:         1. Bilateral calcified pleural plaques. Please correlate for history of asbestos exposure.  2. Redemonstration of bibasilar atelectasis or scarring unchanged on the right unchanged to slightly worse on the left.    Signer Name: Shannon Walters MD   Signed: 1/7/2020 7:41 AM   Workstation Name: [email or web address]    [facility]          EKG: Sinus rhythm    ECHO: Normal wall motion and EF with LVH    Tele: None available    Assessment   Assessment/Plan: 1 PAF-the patient spontaneously converted back to sinus rhythm.  While in A. fib he had slow ventricular response.  The patient is currently still undergoing procedures.  Recommend anticoagulation with Eliquis once cleared by all.    Zack Fry MD  01/09/20  8:04 AM

## 2020-01-09 NOTE — CONSULTS
[facility]    Referring Provider: No ref. provider found    Primary Care Provider: Yumiko Mata DO    Reason for Consultation: Choledocholithiasis    Chief complaint : Abdominal pain    History of present illness:  Miguel Angel Crane is a 87 y.o. male who is admitted with acute cholecystitis.  He underwent laparoscopic cholecystectomy yesterday IOC showed some stones in the distal common bile duct which was nondilated.  He continues to complain of abdominal pain.  No nausea or vomiting    Previous medical history positive for CVA, hypertension, CAD and hypothyroidism, BPH    Allergies:  Patient has no known allergies.    Scheduled Meds:    gabapentin 200 mg Oral Nightly   levothyroxine 50 mcg Oral Q AM   piperacillin-tazobactam 3.375 g Intravenous Q8H   sodium chloride 10 mL Intravenous Q12H   tamsulosin 0.8 mg Oral Daily        Infusions:    lactated ringers 75 mL/hr Last Rate: Stopped (01/09/20 0557)   lactated ringers 9 mL/hr        PRN Meds:  •  acetaminophen  •  aluminum-magnesium hydroxide-simethicone  •  diphenhydrAMINE  •  HYDROmorphone **AND** naloxone  •  lactated ringers  •  ondansetron  •  oxyCODONE-acetaminophen  •  sodium chloride  •  sodium chloride    Home Meds:  Medications Prior to Admission   Medication Sig Dispense Refill Last Dose   • amLODIPine (NORVASC) 5 MG tablet TAKE 1 TABLET BY MOUTH ONCE DAILY 90 tablet 0 11/15/2019 at 0430   • gabapentin (NEURONTIN) 100 MG capsule Take 200 mg by mouth Every Night.   11/14/2019 at 2000   • HYDROcodone-acetaminophen (NORCO)  MG per tablet Take 1 tablet by mouth Every 4 (Four) Hours As Needed. 25 tablet 0    • levothyroxine (SYNTHROID, LEVOTHROID) 50 MCG tablet Take 50 mcg by mouth daily.   11/15/2019 at 0430   • Multiple Vitamins-Minerals (PRESERVISION AREDS 2 PO) Take 1 tablet by mouth 2 (Two) Times a Day.   11/15/2019 at 0430   • tamsulosin (FLOMAX) 0.4 MG capsule 24 hr capsule Take 2 capsules by mouth Daily.   11/15/2019 at 0430  "      ROS: Review of Systems   Constitutional: Negative for chills, fever and unexpected weight change.   Respiratory: Negative for shortness of breath.    Cardiovascular: Negative for chest pain.   Gastrointestinal: Positive for abdominal distention and abdominal pain.   Skin: Negative for color change.   All other systems reviewed and are negative.      PAST MED HX: Pt  has a past medical history of Arthritis, BPH (benign prostatic hyperplasia), Cataracts, both eyes, Chronic constipation, CKD (chronic kidney disease), stage II, Coronary artery disease, Hypertension, Hypothyroidism, Kidney stones, Low back pain, Mass of pancreas, Myocardial infarct (CMS/HCC) (1993), and Stroke (CMS/[facility]).  PAST SURG HX: Pt  has a past surgical history that includes Tonsillectomy; Carotid stent (Left, 02/03/2016); Cataract extraction w/ intraocular lens  implant, bilateral (Bilateral); lumbar laminectomy discectomy decompression (N/A, 10/14/2016); cystoscopy ureteroscopy (Right, 11/6/2019); and extracorporeal shockwave lithotripsy (eswl), stent insertion/removal (Right, 11/15/2019).  FAM HX: family history includes Heart disease in his father and mother.  SOC HX: Pt  reports that he quit smoking about 28 years ago. He has a 21.00 pack-year smoking history. He has never used smokeless tobacco. He reports that he does not drink alcohol or use drugs.    /63 (BP Location: Left arm, Patient Position: Lying)   Pulse 71   Temp 98.6 °F (37 °C) (Oral)   Resp 18   Ht 180.3 cm (71\")   Wt 113 kg (249 lb)   SpO2 94%   BMI 34.73 kg/m²     Physical Exam  Wt Readings from Last 3 Encounters:   01/08/20 113 kg (249 lb)   11/15/19 109 kg (240 lb)   11/13/19 109 kg (240 lb)   ,body mass index is 34.73 kg/m².    General Well developed; well nourished; no acute distress.   ENT .  Oral mucosa pink & moist without thrush or lesions.    Neck Neck supple; trachea midline. No thyromegaly  Resp CTA; no rhonchi, rales, or wheezes.  Respiration " effort normal  CV RRR; ; no M/R/G. No lower extremity edema  GI Abd soft, distended, laparoscopic sites port he has a VEL drain in the right abdomen, normal active bowel sounds.   No abd hernia  Skin No rash; no lesions; no bruises.  Skin turgor normal  Musc No clubbing; no cyanosis.    Psych Oriented to time, place, and person.  Appropriate affect      Results Review:   I reviewed the patient's new clinical results.  I reviewed the patient's new imaging results and agree with the interpretation.    Lab Results   Component Value Date    WBC 4.23 01/08/2020    HGB 11.0 (L) 01/08/2020    HCT 33.1 (L) 01/08/2020    .6 (H) 01/08/2020     (L) 01/08/2020       Lab Results   Component Value Date    GLUCOSE 130 (H) 01/09/2020    BUN 15 01/09/2020    CREATININE 1.37 (H) 01/09/2020    EGFRIFNONA 49 (L) 01/09/2020    BCR 10.9 01/09/2020    CO2 23.0 01/09/2020    CALCIUM 8.9 01/09/2020    ALBUMIN 4.00 01/09/2020    AST 34 01/09/2020    ALT 18 01/09/2020   CT per radiology report:  IMPRESSION:  No acute intra-abdominal or pelvic process is appreciated.  Specifically, the previously noted obstructing right renal calculus has  been removed/resolved. No evidence of new hydronephrosis or obstructive  uropathy appreciated. There is a punctate not affecting left renal  calculus noted.      POTENTIALLY CLINICALLY SIGNIFICANT INCIDENTAL FINDINGS:     - Focal wall thickening of the gallbladder identified without  gallbladder distention or CT evidence for pericholecystic inflammation.  Correlate clinically.     - Mild gastric fold prominence which is a nonspecific finding can be  seen in gastritis. Consider further evaluation with endoscopy if  clinically appropriate.     -Redemonstration of a 2.9 cm hypoattenuated lesion within the mid body  of the pancreas, similar to 11/05/2019 in which underlying pancreatic  neoplasm is not excluded. If clinically appropriate follow-up CT or MRI  of the abdomen with IV contrast  (pancreatic mass protocol) can further  evaluate.     -Findings suggestive of early hepatic steatosis.     -Diverticulosis without CT evidence of diverticulitis.     - Heavy calcified and noncalcified atherosclerotic disease of the  abdominal aorta and its branches with focal ectasia of the right common  iliac artery measuring up to 2.1 cm, similar to 11/05/2019.         ASSESSMENTS/PLANS    1.  Choledocholithiasis  2.  Status post laparoscopic cholecystectomy with VEL drain  3.  CAD  4.  CKD  5.  Pancreatic mass    Concerning his choledocholithiasis will proceed with ERCP today.    Risk and benefits explained including incomplete cannulation, 10%;  Perforation, 1/ 500;  Bleeding, 1/500:  Infection; Pancreatitis, 5 to 10% mild to moderate;  and 5% severe with 1/1000 risk of death    Concerning his pancreatic mass.  He has been evaluated by Dr. Tay in November.  Records at that time states he has a CT scan dating back to April 2008 with a lesion in the same locations and same size as the current lesion.  He had an endoscopic ultrasound by Dr. Agustin at that time which did not demonstrate evidence for pancreatic pathology.  He is not an operative candidate secondary to his age.  He does not need further work-up.    I discussed the patients findings and my recommendations with patient    Manuel Doll MD  01/09/20  8:11 AM

## 2020-01-09 NOTE — PROGRESS NOTES
"General Surgery Post op Follow Up Note    Subjective:   Feeling a bit better.  ERC pending.    /76   Pulse 88   Temp 98.4 °F (36.9 °C) (Oral)   Resp 18   Ht 180.3 cm (71\")   Wt 113 kg (249 lb)   SpO2 94%   BMI 34.73 kg/m²     General Appearance:  in no acute distress  Abdomen: incision is clean and dry times 3. VEL noted.    CBC  Results from last 7 days   Lab Units 01/08/20  0622   WBC 10*3/mm3 4.23   HEMOGLOBIN g/dL 11.0*   HEMATOCRIT % 33.1*   PLATELETS 10*3/mm3 110*       CMP/BMP  Results from last 7 days   Lab Units 01/09/20  0517   SODIUM mmol/L 138   POTASSIUM mmol/L 4.4   CHLORIDE mmol/L 100   CO2 mmol/L 23.0   BUN mg/dL 15   CREATININE mg/dL 1.37*   CALCIUM mg/dL 8.9   BILIRUBIN mg/dL 0.9   ALK PHOS U/L 62   ALT (SGPT) U/L 18   AST (SGOT) U/L 34   GLUCOSE mg/dL 130*         ASSESSMENT/PLAN:    ERC with stone extraction today with Dr. Doll.  May advance diet as tolerated afterward.  Repeat labs in the morning.  May anticoagulate tomorrow from a surgical perspective.    Arnaldo Gan MD  1/9/2020  1:30 PM  "

## 2020-01-09 NOTE — ANESTHESIA PREPROCEDURE EVALUATION
Anesthesia Evaluation     Patient summary reviewed and Nursing notes reviewed                Airway   Mallampati: II  TM distance: >3 FB  Neck ROM: full  No difficulty expected  Dental - normal exam   (+) edentulous    Pulmonary - normal exam   (+) a smoker Former,   Cardiovascular - normal exam    (+) hypertension, CAD, dysrhythmias Paroxysmal Atrial Fib,  carotid artery disease (s/p stent) left carotid    ROS comment: Echo yesterday: normal EF, mild MAC, trace MR    Neuro/Psych  (+) CVA,     GI/Hepatic/Renal/Endo    (+) morbid obesity,  renal disease CRI, thyroid problem hypothyroidism    ROS Comment: S/p lap sunil yesterday; choledocholithiasis    Musculoskeletal (-) negative ROS    Abdominal  - normal exam    Bowel sounds: normal.   Substance History - negative use     OB/GYN negative ob/gyn ROS         Other                        Anesthesia Plan    ASA 3     general     intravenous induction     Anesthetic plan, all risks, benefits, and alternatives have been provided, discussed and informed consent has been obtained with: patient.    Plan discussed with CRNA.

## 2020-01-09 NOTE — OP NOTE
See complete ERCP report for details    ERCP completed with easy cannulation of the common bile duct with small filling defect noted on initial cholangiogram.  After biliary sphincterotomy and balloon sweep we were able to deliver 1 diminutive stone into the duodenal lumen and with multiple sweeps and final occlusion cholangiogram no other filling defects or stones were seen.  There is free flow of clear bile noted.  The endoscope was then withdrawn    Impression  Status post ERCP with CBD clearance    Plan  Observation  Advance diet

## 2020-01-09 NOTE — PLAN OF CARE
Problem: Patient Care Overview  Goal: Plan of Care Review  Outcome: Ongoing (interventions implemented as appropriate)  Flowsheets (Taken 1/9/2020 1700)  Progress: no change  Plan of Care Reviewed With: patient; spouse  Outcome Summary: VSS; 10/10 pain only relieved by dilaudid; ERCP at 1500  Goal: Individualization and Mutuality  Outcome: Ongoing (interventions implemented as appropriate)  Goal: Discharge Needs Assessment  Outcome: Ongoing (interventions implemented as appropriate)  Goal: Interprofessional Rounds/Family Conf  Outcome: Ongoing (interventions implemented as appropriate)     Problem: Fall Risk (Adult)  Goal: Identify Related Risk Factors and Signs and Symptoms  Outcome: Ongoing (interventions implemented as appropriate)  Goal: Absence of Fall  Outcome: Ongoing (interventions implemented as appropriate)     Problem: Pain, Chronic (Adult)  Goal: Identify Related Risk Factors and Signs and Symptoms  Outcome: Ongoing (interventions implemented as appropriate)  Goal: Acceptable Pain/Comfort Level and Functional Ability  Outcome: Ongoing (interventions implemented as appropriate)     Problem: Cholecystectomy (Adult)  Goal: Signs and Symptoms of Listed Potential Problems Will be Absent, Minimized or Managed (Cholecystectomy)  Outcome: Ongoing (interventions implemented as appropriate)  Goal: Anesthesia/Sedation Recovery  Outcome: Ongoing (interventions implemented as appropriate)     Problem: Skin Injury Risk (Adult)  Goal: Identify Related Risk Factors and Signs and Symptoms  Outcome: Ongoing (interventions implemented as appropriate)  Goal: Skin Health and Integrity  Outcome: Ongoing (interventions implemented as appropriate)

## 2020-01-09 NOTE — PLAN OF CARE
Pt had pain and intermittent nausea overnight. Ice chips were given sparingly. Vs stable. Pt on monitor

## 2020-01-09 NOTE — PROGRESS NOTES
Continued Stay Note  [facility]     Patient Name: Miguel Angel Crane  MRN: [id number]  Today's Date: 1/9/2020    Admit Date: 1/7/2020    Discharge Plan     Row Name 01/09/20 1203       Plan    Plan  Home at DC    Patient/Family in Agreement with Plan  yes    Plan Comments  I spoke with the pt. His goal remains home at DC. States he has no DC needs. PT/OT to see. CM will follow.        Discharge Codes    No documentation.       Expected Discharge Date and Time     Expected Discharge Date Expected Discharge Time    Jan 8, 2020             Apoorva Durand RN

## 2020-01-09 NOTE — ANESTHESIA POSTPROCEDURE EVALUATION
Patient: Miguel Angel Crane    Procedure Summary     Date:  01/09/20 Room / Location:  [facility] ENDOSCOPY 1 /  [facility]    Anesthesia Start:  1611 Anesthesia Stop:      Procedure:  ENDOSCOPIC RETROGRADE CHOLANGIOPANCREATOGRAPHY WITH CANNULATION, BALLOON SWEEP, AND STONE REMOVAL (N/A ) Diagnosis:      Surgeon:  Fredy Schaffer MD Provider:  Romeo Rodriguez MD    Anesthesia Type:  general ASA Status:  3          Anesthesia Type: general    Vitals  No vitals data found for the desired time range.          Post Anesthesia Care and Evaluation    Patient location during evaluation: PACU  Patient participation: complete - patient participated  Level of consciousness: awake  Pain score: 0  Pain management: adequate  Airway patency: patent  Anesthetic complications: No anesthetic complications  PONV Status: none  Cardiovascular status: acceptable and stable  Respiratory status: nasal cannula, unassisted, acceptable and spontaneous ventilation  Hydration status: acceptable

## 2020-01-09 NOTE — ANESTHESIA POSTPROCEDURE EVALUATION
Patient: Miguel Angel Crane    Procedure Summary     Date:  01/08/20 Room / Location:   [facility] /  [facility]    Anesthesia Start:  1757 Anesthesia Stop:  2004    Procedures:       CHOLECYSTECTOMY LAPAROSCOPIC (N/A Abdomen)      INTRAOPERATIVE CHOLANGIOGRAM (N/A Abdomen) Diagnosis:      Surgeon:  Arnaldo Gan MD Provider:  León Tavares MD    Anesthesia Type:  general ASA Status:  3          Anesthesia Type: general    Vitals  No vitals data found for the desired time range.          Post Anesthesia Care and Evaluation    Patient location during evaluation: PACU  Patient participation: complete - patient participated  Level of consciousness: awake  Pain score: 0  Pain management: adequate  Airway patency: patent  Anesthetic complications: No anesthetic complications  PONV Status: none  Cardiovascular status: acceptable  Respiratory status: acceptable  Hydration status: acceptable

## 2020-01-09 NOTE — PROGRESS NOTES
[facility] Medicine Services  Clinical Decision Unit  PROGRESS NOTE    Patient Name: Miguel Angel Crane  : 1932  MRN: [id number]    Admission Date and Time: 2020  6:43 AM  Primary Care Physician: Yumiko Mata DO    Subjective   Subjective   CC:  RUQ abdominal pain    HPI:  Status post CCY yesterday, still with epigastric pain but no nausea or emesis.  Noted during surgery patient with distal CBD stone, plan is for EGD extraction today.  Patient has VEL drain in place after CCY, overall feels generally weak.      ROS:  Gen- No fevers, chills  CV- No chest pain, palpitations  Resp- No cough, dyspnea  GI-see above  All other systems have been reviewed and the pertinent positives and negatives are listed above in the HPI or ROS    Objective   Objective   Vital Signs:   Temp:  [97.8 °F (36.6 °C)-98.7 °F (37.1 °C)] 98.4 °F (36.9 °C)  Heart Rate:  [63-96] 96  Resp:  [16-18] 18  BP: (116-140)/(56-76) 135/76  Physical Exam:  Constitutional: No acute distress, awake, alert, nontoxic, obese body habitus  Eyes: Pupils equal, sclerae anicteric  HENT: Mucous membranes dry  Respiratory: Decreased bases bilaterally, good effort, nonlabored respirations   Cardiovascular: RRR with NSR on telemetry  Gastrointestinal: Decreased bowel sounds, soft, epigastric TTP however palpation was performed gingerly due to POD #1, nondistended  Musculoskeletal: No peripheral edema, normal muscle tone for age  Psychiatric: Appropriate affect, good insight and judgement, cooperative  Skin: No jaundice    Results Reviewed:  Normal LFTs   Creatinine remains near his baseline range at 1.37    Assessment/Plan   Assessment / Plan     Active Hospital Problems    Diagnosis  POA   • **Acute cholecystitis [K81.0]  Yes   • Choledocholithiasis [K80.50]  Yes   • PAF (paroxysmal atrial fibrillation) (CMS/HCC) [I48.0]  Unknown   • Coronary artery disease [I25.10]  Yes   • Hypertension [I10]  Yes   • CKD (chronic kidney  disease), stage II [N18.2]  Yes   • Obesity (BMI 30-39.9) [E66.9]  Yes      Resolved Hospital Problems   No resolved problems to display.     Brief course to date:  Miguel Angel Crane is a 87 y.o. male who presented with PMHx of HTN, hx of CVA, stable CAD/RAQUEL, hypothyroidism, BPH, and biliary colic presented to [facility] ED with 9/10 aching RUQ pain unrelieved by his home chronic pain medication for ~24-48hrs.  Has had similar pain for ~1 yr but over past 2 months has escalated in frequency and duration over time.     - Continue antibiotics, will transition patient from Zosyn to oral Augmentin after ERCP  - ERCP planned for today for distal CBD stone extraction, clinically patient without signs of obstruction on exam and labs  - New PAF diagnosed this hospitalization, his home amlodipine has been stopped and exchanged for trial of metoprolol however patient heart rate would not tolerate --> currently rate controlled without medications  - Chads vasc = 5 and warrants full anticoagulation with Eliquis, will be started on 1/10/20 am      Discharge Blueprint:   1. ERCP performed  2. VEL drain at CCY site removed or has removal plan at post-op f/u  3. Evaluation by PT/OT as suspect patient may benefit from rehab, if does not qualify need to safely ambulate  4. Tolerating appropriate PO  5. Pain improved     Electronically signed by Yuli Lombardi MD, 01/09/20, 10:49 AM.

## 2020-01-10 LAB
ALBUMIN SERPL-MCNC: 3.8 G/DL (ref 3.5–5.2)
ALBUMIN/GLOB SERPL: 1.7 G/DL
ALP SERPL-CCNC: 54 U/L (ref 39–117)
ALT SERPL W P-5'-P-CCNC: 20 U/L (ref 1–41)
ANION GAP SERPL CALCULATED.3IONS-SCNC: 11 MMOL/L (ref 5–15)
AST SERPL-CCNC: 30 U/L (ref 1–40)
BILIRUB SERPL-MCNC: 0.6 MG/DL (ref 0.2–1.2)
BUN BLD-MCNC: 22 MG/DL (ref 8–23)
BUN/CREAT SERPL: 15.4 (ref 7–25)
CALCIUM SPEC-SCNC: 8.5 MG/DL (ref 8.6–10.5)
CHLORIDE SERPL-SCNC: 98 MMOL/L (ref 98–107)
CO2 SERPL-SCNC: 24 MMOL/L (ref 22–29)
CREAT BLD-MCNC: 1.43 MG/DL (ref 0.76–1.27)
DEPRECATED RDW RBC AUTO: 47.8 FL (ref 37–54)
ERYTHROCYTE [DISTWIDTH] IN BLOOD BY AUTOMATED COUNT: 13.6 % (ref 12.3–15.4)
GFR SERPL CREATININE-BSD FRML MDRD: 47 ML/MIN/1.73
GLOBULIN UR ELPH-MCNC: 2.2 GM/DL
GLUCOSE BLD-MCNC: 109 MG/DL (ref 65–99)
HCT VFR BLD AUTO: 33.8 % (ref 37.5–51)
HGB BLD-MCNC: 11.5 G/DL (ref 13–17.7)
LIPASE SERPL-CCNC: 12 U/L (ref 13–60)
MCH RBC QN AUTO: 32.6 PG (ref 26.6–33)
MCHC RBC AUTO-ENTMCNC: 34 G/DL (ref 31.5–35.7)
MCV RBC AUTO: 95.8 FL (ref 79–97)
PLATELET # BLD AUTO: 115 10*3/MM3 (ref 140–450)
PMV BLD AUTO: 9.6 FL (ref 6–12)
POTASSIUM BLD-SCNC: 4.3 MMOL/L (ref 3.5–5.2)
PROT SERPL-MCNC: 6 G/DL (ref 6–8.5)
RBC # BLD AUTO: 3.53 10*6/MM3 (ref 4.14–5.8)
SODIUM BLD-SCNC: 133 MMOL/L (ref 136–145)
WBC NRBC COR # BLD: 7.35 10*3/MM3 (ref 3.4–10.8)

## 2020-01-10 PROCEDURE — 97162 PT EVAL MOD COMPLEX 30 MIN: CPT

## 2020-01-10 PROCEDURE — G0378 HOSPITAL OBSERVATION PER HR: HCPCS

## 2020-01-10 PROCEDURE — 63710000001 AMOXICILLIN-CLAVULANATE 875-125 MG TABLET: Performed by: INTERNAL MEDICINE

## 2020-01-10 PROCEDURE — 63710000001 HYDROCODONE-ACETAMINOPHEN 10-325 MG TABLET: Performed by: NURSE PRACTITIONER

## 2020-01-10 PROCEDURE — 63710000001 SENNOSIDES-DOCUSATE 8.6-50 MG TABLET: Performed by: FAMILY MEDICINE

## 2020-01-10 PROCEDURE — A9270 NON-COVERED ITEM OR SERVICE: HCPCS | Performed by: INTERNAL MEDICINE

## 2020-01-10 PROCEDURE — 99225 PR SBSQ OBSERVATION CARE/DAY 25 MINUTES: CPT | Performed by: FAMILY MEDICINE

## 2020-01-10 PROCEDURE — A9270 NON-COVERED ITEM OR SERVICE: HCPCS | Performed by: FAMILY MEDICINE

## 2020-01-10 PROCEDURE — 63710000001 MAGNESIUM HYDROXIDE 2400 MG/10ML SUSPENSION: Performed by: FAMILY MEDICINE

## 2020-01-10 PROCEDURE — 80053 COMPREHEN METABOLIC PANEL: CPT | Performed by: INTERNAL MEDICINE

## 2020-01-10 PROCEDURE — 63710000001 APIXABAN 5 MG TABLET: Performed by: INTERNAL MEDICINE

## 2020-01-10 PROCEDURE — 85027 COMPLETE CBC AUTOMATED: CPT | Performed by: INTERNAL MEDICINE

## 2020-01-10 PROCEDURE — A9270 NON-COVERED ITEM OR SERVICE: HCPCS | Performed by: NURSE PRACTITIONER

## 2020-01-10 PROCEDURE — 99212 OFFICE O/P EST SF 10 MIN: CPT | Performed by: PHYSICIAN ASSISTANT

## 2020-01-10 PROCEDURE — 63710000001 TAMSULOSIN 0.4 MG CAPSULE: Performed by: INTERNAL MEDICINE

## 2020-01-10 PROCEDURE — 63710000001 LEVOTHYROXINE 50 MCG TABLET: Performed by: INTERNAL MEDICINE

## 2020-01-10 PROCEDURE — 83690 ASSAY OF LIPASE: CPT | Performed by: INTERNAL MEDICINE

## 2020-01-10 PROCEDURE — 97166 OT EVAL MOD COMPLEX 45 MIN: CPT

## 2020-01-10 PROCEDURE — 63710000001 GABAPENTIN 100 MG CAPSULE: Performed by: INTERNAL MEDICINE

## 2020-01-10 RX ORDER — HYDROCODONE BITARTRATE AND ACETAMINOPHEN 10; 325 MG/1; MG/1
1 TABLET ORAL EVERY 4 HOURS PRN
Status: DISCONTINUED | OUTPATIENT
Start: 2020-01-10 | End: 2020-01-11 | Stop reason: HOSPADM

## 2020-01-10 RX ORDER — AMOXICILLIN 250 MG
2 CAPSULE ORAL NIGHTLY
Status: DISCONTINUED | OUTPATIENT
Start: 2020-01-10 | End: 2020-01-11 | Stop reason: HOSPADM

## 2020-01-10 RX ADMIN — APIXABAN 5 MG: 5 TABLET, FILM COATED ORAL at 09:49

## 2020-01-10 RX ADMIN — MAGNESIUM HYDROXIDE 10 ML: 2400 SUSPENSION ORAL at 11:27

## 2020-01-10 RX ADMIN — TAMSULOSIN HYDROCHLORIDE 0.8 MG: 0.4 CAPSULE ORAL at 11:24

## 2020-01-10 RX ADMIN — LEVOTHYROXINE SODIUM 50 MCG: 50 TABLET ORAL at 06:07

## 2020-01-10 RX ADMIN — AMOXICILLIN AND CLAVULANATE POTASSIUM 1 TABLET: 875; 125 TABLET, FILM COATED ORAL at 09:48

## 2020-01-10 RX ADMIN — AMOXICILLIN AND CLAVULANATE POTASSIUM 1 TABLET: 875; 125 TABLET, FILM COATED ORAL at 20:41

## 2020-01-10 RX ADMIN — APIXABAN 5 MG: 5 TABLET, FILM COATED ORAL at 20:42

## 2020-01-10 RX ADMIN — SODIUM CHLORIDE, PRESERVATIVE FREE 10 ML: 5 INJECTION INTRAVENOUS at 09:49

## 2020-01-10 RX ADMIN — HYDROCODONE BITARTRATE AND ACETAMINOPHEN 1 TABLET: 10; 325 TABLET ORAL at 06:23

## 2020-01-10 RX ADMIN — SENNOSIDES AND DOCUSATE SODIUM 2 TABLET: 8.6; 5 TABLET ORAL at 20:41

## 2020-01-10 RX ADMIN — GABAPENTIN 200 MG: 100 CAPSULE ORAL at 20:41

## 2020-01-10 NOTE — PLAN OF CARE
VSS.  Pt on room air much of day; when sleeping pt sats drop to 85% requiring O2.  VEL drain removed today, as well as bandages over lap sites.  Pt sat in chair for much of the day.  Will continue to monitor.

## 2020-01-10 NOTE — PLAN OF CARE
Problem: Patient Care Overview  Goal: Plan of Care Review  Flowsheets (Taken 1/10/2020 1030)  Progress: improving  Plan of Care Reviewed With: spouse; patient  Outcome Summary: OT eval completed Pt cond I STS and transfer to chair, CGA bed mob only, wife states assists at baseline for ADLs and presents at baseline ADL completion, no further recom IPOT at this time, recom home with 24/7 assist

## 2020-01-10 NOTE — THERAPY EVALUATION
Patient Name: Miguel Angel Crane  : 1932    MRN: [id number]                              Today's Date: 1/10/2020       Admit Date: 2020    Visit Dx:     ICD-10-CM ICD-9-CM   1. Biliary colic K80.50 574.20   2. Non-intractable vomiting with nausea, unspecified vomiting type R11.2 787.01   3. Acute cholecystitis K81.0 575.0   4. Cholecystitis, acute K81.0 575.0   5. Choledocholithiasis K80.50 574.50   6. Impaired mobility and ADLs Z74.09 799.89   7. Impaired functional mobility, balance, gait, and endurance Z74.09 V49.89     Patient Active Problem List   Diagnosis   • Carotid stenosis, symptomatic, with infarction (CMS/HCC)   • Degenerative disc disease, lumbar   • Spinal stenosis of lumbar region   • Spinal stenosis of lumbar region at multiple levels   • Gallbladder disease   • Obesity (BMI 30-39.9)   • Pancreatic mass   • Acute cholecystitis   • Coronary artery disease   • Hypertension   • CKD (chronic kidney disease), stage II   • Choledocholithiasis   • PAF (paroxysmal atrial fibrillation) (CMS/HCC)     Past Medical History:   Diagnosis Date   • Arthritis    • BPH (benign prostatic hyperplasia)    • Cataracts, both eyes    • Chronic constipation    • CKD (chronic kidney disease), stage II    • Coronary artery disease    • Hypertension    • Hypothyroidism    • Kidney stones    • Low back pain    • Mass of pancreas    • Myocardial infarct (CMS/HCC)    • PAF (paroxysmal atrial fibrillation) (CMS/HCC) 2020    New diagnosis 2020   • Stroke (CMS/HCC)     left MCA, secondary to left carotid stenosis     Past Surgical History:   Procedure Laterality Date   • CAROTID STENT Left 2016    sx with prior CVA   • CATARACT EXTRACTION W/ INTRAOCULAR LENS  IMPLANT, BILATERAL Bilateral    • CHOLECYSTECTOMY N/A 2020    Procedure: CHOLECYSTECTOMY LAPAROSCOPIC WITH IOC ;  Surgeon: Arnaldo Gan MD;  Location: [facility];  Service: General   • CYSTOSCOPY URETEROSCOPY Right 2019    Procedure:  CYSTOSCOPY RIGHT URETERSCOPY RIGHT URETERAL STENT PLACEMENT;  Surgeon: Britton Saba MD;  Location:  [facility];  Service: Urology   • ERCP N/A 1/9/2020    Procedure: ENDOSCOPIC RETROGRADE CHOLANGIOPANCREATOGRAPHY WITH CANNULATION, BALLOON SWEEP, AND STONE REMOVAL;  Surgeon: Fredy Schaffer MD;  Location: [facility] ENDOSCOPY;  Service: Gastroenterology   • EXTRACORPOREAL SHOCKWAVE LITHOTRIPSY (ESWL), STENT INSERTION/REMOVAL Right 11/15/2019    Procedure: EXTRACORPOREAL SHOCKWAVE LITHOTRIPSY WITH  STENT REMOVAL;  Surgeon: Britton Saba MD;  Location:  [facility];  Service: Urology   • LUMBAR LAMINECTOMY DISCECTOMY DECOMPRESSION N/A 10/14/2016    Procedure: LUMBAR LAMINECTOMY  L3-L4;  Surgeon: George Richmond MD;  Location:  [facility];  Service:    • TONSILLECTOMY       General Information     Row Name 01/10/20 1027          PT Evaluation Time/Intention    Document Type  evaluation  -     Mode of Treatment  individual therapy  -     Row Name 01/10/20 1027          General Information    Patient Profile Reviewed?  yes  -     Prior Level of Function  independent:;all household mobility;community mobility;gait;transfer;bed mobility  -     Existing Precautions/Restrictions  fall;oxygen therapy device and L/min pranav drain  -     Barriers to Rehab  medically complex  -     Row Name 01/10/20 1027          Relationship/Environment    Lives With  spouse  -     Row Name 01/10/20 1027          Resource/Environmental Concerns    Current Living Arrangements  home/apartment/condo  -     Row Name 01/10/20 1027          Cognitive Assessment/Intervention- PT/OT    Orientation Status (Cognition)  oriented to;person;place;situation  -     Row Name 01/10/20 1027          Safety Issues, Functional Mobility    Safety Issues Affecting Function (Mobility)  awareness of need for assistance;insight into deficits/self awareness;positioning of assistive device  -     Impairments Affecting Function (Mobility)   balance;strength  -SJ       User Key  (r) = Recorded By, (t) = Taken By, (c) = Cosigned By    Initials Name Provider Type    Mami Orozco PT Physical Therapist        Mobility     Row Name 01/10/20 1113          Bed Mobility Assessment/Treatment    Comment (Bed Mobility)  UIC  -SJ     Row Name 01/10/20 1113          Transfer Assessment/Treatment    Comment (Transfers)  cues for safety and sequencing  -SJ     Row Name 01/10/20 1113          Sit-Stand Transfer    Sit-Stand [place] (Transfers)  contact guard;1 person assist  -SJ     Assistive Device (Sit-Stand Transfers)  walker, front-wheeled  -SJ     Row Name 01/10/20 1113          Gait/Stairs Assessment/Training    Gait/Stairs Assessment/Training  distance ambulated  -SJ     [place] Level (Gait)  contact guard;1 person assist  -SJ     Assistive Device (Gait)  walker, front-wheeled  -SJ     Distance in Feet (Gait)  200  -SJ     Pattern (Gait)  step-through  -SJ     Deviations/Abnormal Patterns (Gait)  bilateral deviations;gait speed decreased  -SJ     Bilateral Gait Deviations  forward flexed posture  -SJ     Comment (Gait/Stairs)  Pt dem fwd flexed posture, slow speed. No LOB.  -SJ       User Key  (r) = Recorded By, (t) = Taken By, (c) = Cosigned By    Initials Name Provider Type    Mami Orozco PT Physical Therapist        Obj/Interventions     Row Name 01/10/20 1115          General ROM    GENERAL ROM COMMENTS  BLE's WFL  -SJ     Row Name 01/10/20 1115          MMT (Manual Muscle Testing)    General MMT Comments  BLE's 5/5  -SJ     Row Name 01/10/20 1115          Static Sitting Balance    Level of [place] (Unsupported Sitting, Static Balance)  supervision  -SJ     Sitting Position (Unsupported Sitting, Static Balance)  sitting in chair  -SJ     Row Name 01/10/20 1115          Static Standing Balance    Level of [place] (Supported Standing, Static Balance)  standby assist  -SJ     Assistive Device Utilized (Supported Standing,  Static Balance)  walker, rolling  -SJ       User Key  (r) = Recorded By, (t) = Taken By, (c) = Cosigned By    Initials Name Provider Type    Mami Orozco PT Physical Therapist        Goals/Plan     Row Name 01/10/20 1120          Bed Mobility Goal 1 (PT)    Activity/Assistive Device (Bed Mobility Goal 1, PT)  sit to supine;supine to sit  -SJ     [place] Level/Cues Needed (Bed Mobility Goal 1, PT)  conditional independence  -SJ     Time Frame (Bed Mobility Goal 1, PT)  long term goal (LTG);1 week  -SJ     Row Name 01/10/20 1120          Transfer Goal 1 (PT)    Activity/Assistive Device (Transfer Goal 1, PT)  sit-to-stand/stand-to-sit;bed-to-chair/chair-to-bed  -SJ     [place] Level/Cues Needed (Transfer Goal 1, PT)  conditional independence  -SJ     Time Frame (Transfer Goal 1, PT)  long term goal (LTG);1 week  -SJ     Row Name 01/10/20 1120          Gait Training Goal 1 (PT)    Activity/Assistive Device (Gait Training Goal 1, PT)  improve balance and speed;walker, rolling  -SJ     [place] Level (Gait Training Goal 1, PT)  conditional independence  -SJ     Distance (Gait Goal 1, PT)  300  -SJ     Time Frame (Gait Training Goal 1, PT)  long term goal (LTG);2 weeks  -SJ       User Key  (r) = Recorded By, (t) = Taken By, (c) = Cosigned By    Initials Name Provider Type    Mami Orozco PT Physical Therapist        Clinical Impression     Row Name 01/10/20 1116          Pain Assessment    Additional Documentation  Pain Scale: Numbers Pre/Post-Treatment (Group)  -[facility] Name 01/10/20 1116          Pain Scale: Numbers Pre/Post-Treatment    Pain Scale: Numbers, Pretreatment  0/10 - no pain  -     Pain Scale: Numbers, Post-Treatment  0/10 - no pain  -     Row Name 01/10/20 1116          Plan of Care Review    Plan of Care Reviewed With  patient;spouse  -     Outcome Summary  PT eval completed. Pt presents with difficulty walking per PLOF. Pt amb 200ft with RW with CGA on 2LO2nc, dem  fwd flexed posture and slow gait speed. Pt required encouragement to participate in mobility this date, frustrated at times. VSS upon return to room. Room air sat at rest was 94%. PT recommends d/c home with assist.  -     Row Name 01/10/20 1116          Physical Therapy Clinical Impression    Patient/Family Goals Statement (PT Clinical Impression)  return to home  -     Criteria for Skilled Interventions Met (PT Clinical Impression)  yes;treatment indicated  -     Rehab Potential (PT Clinical Summary)  good, to achieve stated therapy goals  -     Predicted Duration of Therapy (PT)  2wks  -     Row Name 01/10/20 1116          Vital Signs    Posttreatment Heart Rate (beats/min)  93  -SJ     Post SpO2 (%)  94  -SJ     O2 Delivery Post Treatment  room air  -     Row Name 01/10/20 1116          Positioning and Restraints    Pre-Treatment Position  sitting in chair/recliner  -SJ     Post Treatment Position  chair  -SJ     In Chair  reclined;call light within reach;encouraged to call for assist;exit alarm on;with family/caregiver;waffle cushion;heels elevated  -       User Key  (r) = Recorded By, (t) = Taken By, (c) = Cosigned By    Initials Name Provider Type    Mami Orozco, PT Physical Therapist        Outcome Measures     Row Name 01/10/20 1121          How much help from another person do you currently need...    Turning from your back to your side while in flat bed without using bedrails?  3  -SJ     Moving from lying on back to sitting on the side of a flat bed without bedrails?  3  -SJ     Moving to and from a bed to a chair (including a wheelchair)?  3  -SJ     Standing up from a chair using your arms (e.g., wheelchair, bedside chair)?  4  -SJ     Climbing 3-5 steps with a railing?  3  -SJ     To walk in hospital room?  3  -SJ     AM-PAC 6 Clicks Score (PT)  19  -     Row Name 01/10/20 1121          Functional Assessment    Outcome Measure Options  AM-PAC 6 Clicks Basic Mobility (PT)  -        User Key  (r) = Recorded By, (t) = Taken By, (c) = Cosigned By    Initials Name Provider Type    Mami Orozco, PT Physical Therapist          PT Recommendation and Plan  Planned Therapy Interventions (PT Eval): balance training, bed mobility training, gait training, home exercise program, patient/family education, strengthening, transfer training  Outcome Summary/Treatment Plan (PT)  Anticipated Discharge Disposition (PT): home with assist  Plan of Care Reviewed With: patient, spouse  Outcome Summary: PT eval completed. Pt presents with difficulty walking per PLOF. Pt amb 200ft with RW with CGA on 2LO2nc, dem fwd flexed posture and slow gait speed. Pt required encouragement to participate in mobility this date, frustrated at times. VSS upon return to room. Room air sat at rest was 94%. PT recommends d/c home with assist.     Time Calculation:   PT Charges     Row Name 01/10/20 1122             Time Calculation    Start Time  1027  -SJ      PT Received On  01/10/20  -      PT Goal Re-Cert Due Date  01/20/20  -        User Key  (r) = Recorded By, (t) = Taken By, (c) = Cosigned By    Initials Name Provider Type    Mami Orozco, PT Physical Therapist        Therapy Charges for Today     Code Description Service Date Service Provider Modifiers Qty    38454242319 HC PT EVAL MOD COMPLEXITY 4 1/10/2020 Mami Kovacs PT GP 1          PT G-Codes  Outcome Measure Options: AM-PAC 6 Clicks Basic Mobility (PT)  AM-PAC 6 Clicks Score (PT): 19  AM-PAC 6 Clicks Score (OT): 16    Mami Kovacs PT  1/10/2020

## 2020-01-10 NOTE — PROGRESS NOTES
"GI Daily Progress Note  Subjective:    Chief Complaint:  Follow up choledocholithiasis     Feels well.  He is in good spirits and denies any abdominal pain.   Tolerating low fat diet.       Objective:    /68 (BP Location: Left arm, Patient Position: Lying)   Pulse 80   Temp 98.3 °F (36.8 °C) (Oral)   Resp 16   Ht 180.3 cm (71\")   Wt 113 kg (249 lb)   SpO2 92%   BMI 34.73 kg/m²     Physical Exam   Cardiovascular: Normal rate and regular rhythm.   Pulmonary/Chest: Effort normal and breath sounds normal. No respiratory distress.   Abdominal: Soft. Bowel sounds are normal. He exhibits no distension. There is no tenderness.   Skin: Skin is warm and dry.   Nursing note and vitals reviewed.      Lab  Lab Results   Component Value Date    WBC 7.35 01/10/2020    HGB 11.5 (L) 01/10/2020    HGB 11.0 (L) 01/08/2020    HGB 12.3 (L) 01/07/2020    MCV 95.8 01/10/2020     (L) 01/10/2020       Lab Results   Component Value Date    GLUCOSE 109 (H) 01/10/2020    BUN 22 01/10/2020    CREATININE 1.43 (H) 01/10/2020    EGFRIFNONA 47 (L) 01/10/2020    BCR 15.4 01/10/2020     (L) 01/10/2020    K 4.3 01/10/2020    CO2 24.0 01/10/2020    CALCIUM 8.5 (L) 01/10/2020    ALBUMIN 3.80 01/10/2020    ALKPHOS 54 01/10/2020    BILITOT 0.6 01/10/2020    ALT 20 01/10/2020    AST 30 01/10/2020       Assessment:    1.  Choledocholithiasis s/p ERCP with sphincterotomy and stone removal   2.  Cholecystitis s/p cholecystectomy with VEL drain on 1/8/20.   3.  CAD  4.  CKD  5.  Pancreatic mass, no further work up needed.  See consult note on 1/9/20.      Plan:    >>> Tolerated ERCP well.   Choledocholithiasis removed.       Will sign off.  Please call for questions or concerns.      LASHAWN Joseph  01/10/20  1:20 PM    "

## 2020-01-10 NOTE — PLAN OF CARE
Problem: Patient Care Overview  Goal: Plan of Care Review  Flowsheets (Taken 1/10/2020 1116)  Plan of Care Reviewed With: patient;spouse  Outcome Summary: PT eval completed. Pt presents with difficulty walking per PLOF. Pt amb 200ft with RW with CGA on 2LO2nc, dem fwd flexed posture and slow gait speed. Pt required encouragement to participate in mobility this date, frustrated at times. VSS upon return to room. Room air sat at rest was 94%. PT recommends d/c home with assist.

## 2020-01-10 NOTE — THERAPY DISCHARGE NOTE
Acute Care - Occupational Therapy Initial Eval/Discharge  [facility]     Patient Name: Miguel Angel Crane  : 1932  MRN: [id number]  Today's Date: 1/10/2020  Onset of Illness/Injury or Date of Surgery: 20  Date of Referral to OT: 20         Admit Date: 2020       ICD-10-CM ICD-9-CM   1. Biliary colic K80.50 574.20   2. Non-intractable vomiting with nausea, unspecified vomiting type R11.2 787.01   3. Acute cholecystitis K81.0 575.0   4. Cholecystitis, acute K81.0 575.0   5. Choledocholithiasis K80.50 574.50   6. Impaired mobility and ADLs Z74.09 799.89     Patient Active Problem List   Diagnosis   • Carotid stenosis, symptomatic, with infarction (CMS/HCC)   • Degenerative disc disease, lumbar   • Spinal stenosis of lumbar region   • Spinal stenosis of lumbar region at multiple levels   • Gallbladder disease   • Obesity (BMI 30-39.9)   • Pancreatic mass   • Acute cholecystitis   • Coronary artery disease   • Hypertension   • CKD (chronic kidney disease), stage II   • Choledocholithiasis   • PAF (paroxysmal atrial fibrillation) (CMS/HCC)     Past Medical History:   Diagnosis Date   • Arthritis    • BPH (benign prostatic hyperplasia)    • Cataracts, both eyes    • Chronic constipation    • CKD (chronic kidney disease), stage II    • Coronary artery disease    • Hypertension    • Hypothyroidism    • Kidney stones    • Low back pain    • Mass of pancreas    • Myocardial infarct (CMS/HCC)    • PAF (paroxysmal atrial fibrillation) (CMS/HCC) 2020    New diagnosis 2020   • Stroke (CMS/HCC)     left MCA, secondary to left carotid stenosis     Past Surgical History:   Procedure Laterality Date   • CAROTID STENT Left 2016    sx with prior CVA   • CATARACT EXTRACTION W/ INTRAOCULAR LENS  IMPLANT, BILATERAL Bilateral    • CHOLECYSTECTOMY N/A 2020    Procedure: CHOLECYSTECTOMY LAPAROSCOPIC WITH IOC ;  Surgeon: Arnaldo Gan MD;  Location: [facility];  Service: General   •  CYSTOSCOPY URETEROSCOPY Right 11/6/2019    Procedure: CYSTOSCOPY RIGHT URETERSCOPY RIGHT URETERAL STENT PLACEMENT;  Surgeon: Britton Saba MD;  Location:  [facility];  Service: Urology   • ERCP N/A 1/9/2020    Procedure: ENDOSCOPIC RETROGRADE CHOLANGIOPANCREATOGRAPHY WITH CANNULATION, BALLOON SWEEP, AND STONE REMOVAL;  Surgeon: Fredy Schaffer MD;  Location: [facility] ENDOSCOPY;  Service: Gastroenterology   • EXTRACORPOREAL SHOCKWAVE LITHOTRIPSY (ESWL), STENT INSERTION/REMOVAL Right 11/15/2019    Procedure: EXTRACORPOREAL SHOCKWAVE LITHOTRIPSY WITH  STENT REMOVAL;  Surgeon: Britton Saba MD;  Location:  [facility];  Service: Urology   • LUMBAR LAMINECTOMY DISCECTOMY DECOMPRESSION N/A 10/14/2016    Procedure: LUMBAR LAMINECTOMY  L3-L4;  Surgeon: George Richmond MD;  Location:  [facility];  Service:    • TONSILLECTOMY            OT ASSESSMENT FLOWSHEET (last 12 hours)      Occupational Therapy Evaluation     Row Name 01/10/20 1030                   OT Evaluation Time/Intention    Subjective Information  no complaints  -KF        Document Type  discharge evaluation/summary  -KF        Mode of Treatment  occupational therapy  -KF        Patient Effort  good  -KF           General Information    Patient Profile Reviewed?  yes  -KF        Onset of Illness/Injury or Date of Surgery  01/07/20  -KF        Patient Observations  alert;cooperative;agree to therapy  -KF        Prior Level of Function  ADL's;max assist:;grooming;dressing;bathing;independent:;all household mobility;transfer  -KF        Equipment Currently Used at Home  walker, rolling  -KF        Existing Precautions/Restrictions  oxygen therapy device and L/min;other (see comments) VEL drain  -KF        Risks Reviewed  patient and family:;nausea/vomiting;LOB;dizziness;increased discomfort;change in vital signs  -KF        Benefits Reviewed  patient and family:;improve function;increase independence;increase strength;increase balance;increase  knowledge  -KF        Barriers to Rehab  previous functional deficit  -KF           Relationship/Environment    Primary Source of Support/Comfort  spouse  -KF        Lives With  spouse  -KF        Family Caregiver if Needed  spouse  -KF           Resource/Environmental Concerns    Current Living Arrangements  home/apartment/condo  -KF           Home Main Entrance    Number of Stairs, Main Entrance  two  -KF        Stair Railings, Main Entrance  railing on left side (ascending)  -KF           Cognitive Assessment/Interventions    Additional Documentation  Cognitive Assessment/Intervention (Group)  -KF           Cognitive Assessment/Intervention- PT/OT    Affect/Mental Status (Cognitive)  WNL  -KF        Orientation Status (Cognition)  oriented x 4  -KF        Follows Commands (Cognition)  WFL;follows one step commands;over 90% accuracy  -KF        Cognitive Function (Cognitive)  safety deficit  -KF        Safety Deficit (Cognitive)  mild deficit;awareness of need for assistance  -KF        Cognitive Interventions (Cognitive)  occupation/activity based interventions;process/task specific training  -KF           Safety Issues, Functional Mobility    Safety Issues Affecting Function (Mobility)  judgment;insight into deficits/self awareness  -KF        Impairments Affecting Function (Mobility)  balance  -KF        Comment, Safety Issues/Impairments (Mobility)  residual R side deficits in sensation  -KF           Bed Mobility Assessment/Treatment    Bed Mobility Assessment/Treatment  rolling left;scooting/bridging;supine-sit  -KF        Rolling Left [place] (Bed Mobility)  contact guard  -KF        Scooting/Bridging [place] (Bed Mobility)  contact guard  -KF        Supine-Sit [place] (Bed Mobility)  contact guard;verbal cues  -KF        Bed Mobility, Safety Issues  decreased use of arms for pushing/pulling;decreased use of legs for bridging/pushing  -KF        Assistive Device (Bed Mobility)  bed rails;head  of bed elevated  -KF           Functional Mobility    Functional Mobility- Ind. Level  standby assist  -KF        Functional Mobility- Device  rolling walker  -KF        Functional Mobility-Distance (Feet)  6  -KF        Functional Mobility- Safety Issues  supplemental O2  -KF           Transfer Assessment/Treatment    Transfer Assessment/Treatment  sit-stand transfer;stand-sit transfer;bed-chair transfer  -KF           Bed-Chair Transfer    Bed-Chair [place] (Transfers)  conditional independence  -KF        Assistive Device (Bed-Chair Transfers)  walker, front-wheeled  -KF           Sit-Stand Transfer    Sit-Stand [place] (Transfers)  conditional independence  -KF        Assistive Device (Sit-Stand Transfers)  walker, front-wheeled  -KF           Stand-Sit Transfer    Stand-Sit [place] (Transfers)  conditional independence  -KF        Assistive Device (Stand-Sit Transfers)  walker, front-wheeled  -KF           ADL Assessment/Intervention    BADL Assessment/Intervention  upper body dressing;lower body dressing  -KF           Upper Body Dressing Assessment/Training    Upper Body Dressing [place] Level  don;front opening garment;set up  -KF        Upper Body Dressing Position  unsupported sitting  -KF        Comment (Upper Body Dressing)  only set up for line management   -KF           Lower Body Dressing Assessment/Training    Lower Body Dressing [place] Level  don;doff;socks;maximum assist (25% patient effort)  -KF        Lower Body Dressing Position  edge of bed sitting  -KF        Comment (Lower Body Dressing)  this is baseline per wife and Pt  -KF           BADL Safety/Performance    Impairments, BADL Safety/Performance  balance;range of motion;coordination  -KF        Skilled BADL Treatment/Intervention  adaptive equipment training;BADL process/adaptation training  -KF           General ROM    GENERAL ROM COMMENTS  BUE WNL   -KF           MMT (Manual Muscle Testing)    General MMT  Comments  BUE grossly 4+/5  -KF           Motor Assessment/Interventions    Additional Documentation  Balance (Group);Balance Interventions (Group);Gross Motor Coordination (Group)  -KF           Gross Motor Coordination    Gross Motor Impairments  AROM (active range of motion)  -KF        Gross Motor Skill, Impairments Detail  mild deficits  RUE   -KF           Balance    Balance  static sitting balance;static standing balance;dynamic sitting balance;dynamic standing balance  -KF           Static Sitting Balance    Level of [place] (Unsupported Sitting, Static Balance)  independent  -KF        Sitting Position (Unsupported Sitting, Static Balance)  sitting on edge of bed  -KF           Dynamic Sitting Balance    Level of [place], Reaches Outside Midline (Sitting, Dynamic Balance)  independent  -KF        Sitting Position, Reaches Outside Midline (Sitting, Dynamic Balance)  sitting on edge of bed;sitting in chair  -KF           Static Standing Balance    Level of [place] (Supported Standing, Static Balance)  supervision  -KF        Time Able to Maintain Position (Supported Standing, Static Balance)  1 to 2 minutes  -KF        Assistive Device Utilized (Supported Standing, Static Balance)  walker, rolling  -KF           Dynamic Standing Balance    Level of [place], Reaches Outside Midline (Standing, Dynamic Balance)  standby assist  -KF        Time Able to Maintain Position, Reaches Outside Midline (Standing, Dynamic Balance)  1 to 2 minutes  -KF        Assistive Device Utilized (Supported Standing, Dynamic Balance)  walker, rolling  -KF        Comment, Reaches Outside Midline (Standing, Dynamic Balance)  no LOB throughout   -KF           Sensory Assessment/Intervention    Sensory General Assessment  light touch sensation deficits identified;hot/cold sensation deficits identified  -KF           Light Touch Sensation Assessment    Right Upper Extremity: Light Touch Sensation Assessment  moderate  impairment, 50 to 74% correct responses  -KF           Hot/Cold Sensation Assessment    Right Upper Extremity: Hot/Cold Sensation Assessment  moderate impairment, 50 to 74% correct responses  -KF           Positioning and Restraints    Pre-Treatment Position  in bed  -KF        Post Treatment Position  chair  -KF        In Chair  notified nsg;sitting;call light within reach;encouraged to call for assist;with PT;with family/caregiver  -KF           Pain Assessment    Additional Documentation  Pain Scale: Numbers Pre/Post-Treatment (Group)  -KF           Pain Scale: Numbers Pre/Post-Treatment    Pain Scale: Numbers, Pretreatment  0/10 - no pain  -KF        Pain Scale: Numbers, Post-Treatment  0/10 - no pain  -KF        Pain Intervention(s)  Repositioned;Ambulation/increased activity  -KF           Wound 01/08/20 lower;midline abdomen Incision    Wound - Properties Group Date first assessed: 01/08/20  -TP Present on Hospital Admission: N  -TP Orientation: lower;midline  -TP Location: abdomen  -TP Primary Wound Type: Incision  -TP       Clinical Impression (OT)    Date of Referral to OT  01/09/20  -KF        OT Diagnosis  ADL decline   -KF        Patient/Family Goals Statement (OT Eval)  states at baseline ADL completion declined AD recom  -KF        Criteria for Skilled Therapeutic Interventions Met (OT Eval)  no;treatment indicated;no problems identified which require skilled intervention  -KF        Therapy Frequency (OT Eval)  evaluation only  -KF        Care Plan Review (OT)  care plan/treatment goals reviewed;evaluation/treatment results reviewed;risks/benefits reviewed;current/potential barriers reviewed;patient/other agree to care plan  -KF        Care Plan Review, Other Participant (OT Eval)  spouse  -KF        Anticipated Equipment Needs at Discharge (OT)  -- declined tub transfer bench info  -KF           Vital Signs    Pre Systolic BP Rehab  -- RN cleared VSS   -KF        Pre SpO2 (%)  98  -KF        O2  Delivery Pre Treatment  supplemental O2  -KF        Post SpO2 (%)  94  -KF        O2 Delivery Post Treatment  room air  -KF        Pre Patient Position  Supine  -KF        Intra Patient Position  Standing  -KF        Post Patient Position  Sitting  -KF           Discharge Summary (Occupational Therapy)    Additional Documentation  Discharge Summary, OT Eval (Group)  -KF           Discharge Summary, OT Eval    Reason for Discharge (OT Discharge Summary)  no further expectation of functional progress  -KF           Living Environment    Home Accessibility  stairs to enter home;tub/shower is not walk in  -KF          User Key  (r) = Recorded By, (t) = Taken By, (c) = Cosigned By    Initials Name Effective Dates    KF Liat Lockett, OT 04/03/18 -     TP Yessenia Brooks RN 12/10/19 -               OT Recommendation and Plan  Outcome Summary/Treatment Plan (OT)  Anticipated Equipment Needs at Discharge (OT): (declined tub transfer bench info)  Anticipated Discharge Disposition (OT): home with assist, home with 24/7 care  Reason for Discharge (OT Discharge Summary): no further expectation of functional progress  Therapy Frequency (OT Eval): evaluation only  Plan of Care Review  Plan of Care Reviewed With: spouse, patient  Plan of Care Reviewed With: spouse, patient  Outcome Summary: OT eval completed Pt cond I STS and transfer to chair, CGA bed mob only, wife states assists at baseline for ADLs and presents at baseline ADL completion, no further recom IPOT at this time, recom home with 24/7 assist         Outcome Measures     Row Name 01/10/20 1030             How much help from another is currently needed...    Putting on and taking off regular lower body clothing?  2  -KF      Bathing (including washing, rinsing, and drying)  2  -KF      Toileting (which includes using toilet bed pan or urinal)  3  -KF      Putting on and taking off regular upper body clothing  3  -KF      Taking care of personal grooming (such as  brushing teeth)  3  -KF      Eating meals  3  -KF      AM-PAC 6 Clicks Score (OT)  16  -KF         Functional Assessment    Outcome Measure Options  AM-PAC 6 Clicks Daily Activity (OT)  -KF        User Key  (r) = Recorded By, (t) = Taken By, (c) = Cosigned By    Initials Name Provider Type    Liat Parada OT Occupational Therapist          Time Calculation:   Time Calculation- OT     Row Name 01/10/20 1030             Time Calculation- OT    OT Start Time  1030  -KF      Total Timed Code Minutes- OT  0 minute(s)  -KF      OT Received On  01/10/20  -KF      OT Goal Re-Cert Due Date  --  -KF        User Key  (r) = Recorded By, (t) = Taken By, (c) = Cosigned By    Initials Name Provider Type    Liat Parada OT Occupational Therapist        Therapy Suggested Charges     Code   Minutes Charges    None           Therapy Charges for Today     Code Description Service Date Service Provider Modifiers Qty    [id number] HC OT EVAL MOD COMPLEXITY 4 1/10/2020 Liat Lockett OT GO 1               OT Discharge Summary  Anticipated Discharge Disposition (OT): home with assist, home with 24/7 care  Reason for Discharge: At baseline function  Outcomes Achieved: Refer to plan of care for updates on goals achieved  Discharge Destination: Home, Home with assist    Liat Lockett OT  1/10/2020

## 2020-01-10 NOTE — PLAN OF CARE
VSS. Pain controlled with IV medication. Lap sites CDI. Oxygen level maintained about 90 on 2L [place]. Will continue to monitor.

## 2020-01-10 NOTE — PROGRESS NOTES
Mr. Crane has been started on Eliquis for stroke prevention in the setting of new-onset afib. Education was provided both verbally and in writing 1/10/2020.  Discussed effects of this new medication, drug-drug interactions, and signs/symptoms of bleeding and stroke / TIA. Mr. Crane and his wife both verbalized understanding through teach back, and all pertinent questions were answered.     Marcy Hooper, PharmD  1/10/2020  10:52 AM

## 2020-01-10 NOTE — PROGRESS NOTES
[facility] Medicine Services  Clinical Decision Unit  PROGRESS NOTE    Patient Name: Miguel Angel Crane  : 1932  MRN: [id number]    Admission Date and Time: 2020  6:43 AM  Primary Care Physician: Yumiko Mata DO    Subjective   Subjective   CC:  RUQ abdominal pain    HPI:  Status post ERCP with stone extraction yesterday, complete resolution of epigastric abdominal pain.  Tolerating clears well, patient asked to be advanced in diet.  Only complaint this morning is constipation with no BM since admission.  Patient typically uses milk of magnesia at home with good results.    VEL drain charted as 50+ml in past 24hr.      ROS:  Gen- No fevers, chills  CV- No chest pain, palpitations  Resp- No cough, dyspnea  GI-see above  All other systems have been reviewed and the pertinent positives and negatives are listed above in the HPI or ROS    Objective   Objective   Vital Signs:   Temp:  [97.7 °F (36.5 °C)-98.9 °F (37.2 °C)] 98.3 °F (36.8 °C)  Heart Rate:  [83-94] 94  Resp:  [12-18] 16  BP: (125-166)/() 125/68  Physical Exam:  Constitutional: No acute distress, awake, alert, nontoxic, obese body habitus  Eyes: Pupils equal, sclerae anicteric  HENT: Mucous membranes moist  Respiratory: Decreased bases bilaterally, good effort, nonlabored respirations   Cardiovascular: RRR with NSR on telemetry  Gastrointestinal: Decreased bowel sounds, soft, appropriately tender for POD#2, mild distended  Musculoskeletal: No peripheral edema, normal muscle tone for age  Psychiatric: Appropriate affect, good insight and judgement, cooperative  Skin: No jaundice    Results Reviewed:  Normal LFTs   Creatinine bumped slightly to 1.43 (baseline ~1.3)    Assessment/Plan   Assessment / Plan     Active Hospital Problems    Diagnosis  POA   • **Acute cholecystitis [K81.0]  Yes   • Choledocholithiasis [K80.50]  Yes   • PAF (paroxysmal atrial fibrillation) (CMS/HCC) [I48.0]  Unknown   • Coronary artery  disease [I25.10]  Yes   • Hypertension [I10]  Yes   • CKD (chronic kidney disease), stage II [N18.2]  Yes   • Obesity (BMI 30-39.9) [E66.9]  Yes      Resolved Hospital Problems   No resolved problems to display.     Brief course to date:  Miguel Angel Crane is a 87 y.o. male who presented with PMHx of HTN, hx of CVA, stable CAD/RAQUEL, hypothyroidism, BPH, and biliary colic presented to [facility] ED with 9/10 aching RUQ pain unrelieved by his home chronic pain medication for ~24-48hrs.  Has had similar pain for ~1 yr but over past 2 months has escalated in frequency and duration over time.     - Continue antibiotics, will transition patient from Zosyn to oral Augmentin after ERCP  - ERCP planned for today for distal CBD stone extraction, clinically patient without signs of obstruction on exam and labs  - New PAF diagnosed this hospitalization, his home amlodipine has been stopped and exchanged for trial of metoprolol however patient heart rate would not tolerate --> currently rate controlled without medications  - Chads vasc = 5 and warrants full anticoagulation with Eliquis, will be started on 1/10/20 am      Discharge Blueprint:   1. ERCP performed  2. VEL drain at CCY site removed or has removal plan at post-op f/u  3. Evaluation by PT/OT as suspect patient may benefit from rehab, if does not qualify need to safely ambulate  4. Tolerating appropriate PO  5. Pain improved     Electronically signed by Yuli Lombardi MD, 01/10/20, 10:21 AM.

## 2020-01-10 NOTE — PROGRESS NOTES
Clinical Nutrition   Reason For Visit: Follow-up protocol, NPO/Clear liquid >/= 3 days    Patient Name: Miguel Angel Crane  YOB: 1932  MRN: [id number]  Date of Encounter: 01/10/20 8:17 AM  Admission date: 1/7/2020    NPO/Clear liquids >/= 3 days during this admission (4 days total).  RD notes patient receiving Boost Breeze.  Will continue to monitor PO status.    Nutrition Assessment     Admission Problem List:  Abdominal pain  N/V x 2  Biliary colic  PAF (new onset)  TAZ on CKD  Acute on chronic calculus cholecystitis      Applicable PMH:  HTN  CVA  RAQUEL/CAD  BPH  Biliary colic  Pancreatic mass  Chronic constipation 2/2 chronic pain medication  MI  CKD stage 2      Applicable medical tests/procedures since admission:  (1/8) s/p CCY  (1/9) s/p ERCP - distal CBD stone removed       Reported/Observed/Food/Nutrition Related History   Patient and wife present during visit. Wife reports patient has been eating <50% of his usual PO intake during the past few weeks. Eats mostly soft foods. Abdominal pain has been going on for 1 year, but worse during the past 2 months. Resulting unintentional weight loss. Patient states he weighed 260 lbs a couple weeks ago. Patient states last solid food intake was Monday (1/6). Denies food allergies and difficulty chewing/swallowing. Declines oral nutrition supplements.    Anthropometrics   Height: 71 in  Weight: 249 lbs (bed scale weight 1/7 per [facility] doc)  BMI: 34.8  BMI classification: Obese Class I: 30-34.9kg/m2   IBW: 172 lbs    Per RD note (1/8):  UBW: 260 lbs (standing wt at home couple of week prior to admission per patient)  Weight change: Unintentional weight loss of 11 lbs (4.2%) x 2 weeks.      Labs reviewed   Labs reviewed: Yes    Medications reviewed   Medications reviewed: Yes  Pertinent: augmentin  GTT: IVF @ 10 ml/hr  PRN: zofran, maalox max    Current Nutrition Prescription   PO: Diet Clear Liquid   Oral Nutrition Supplement: Boost Breeze 2x daily    Evaluation  of Received Nutrient/Fluid Intake: insufficient data      Nutrition Diagnosis     1/8  Problem Unintended weight loss   Etiology N/V, abdominal pain, decreased appetite   Signs/Symptoms Unintentional weight loss of 11 lbs (4.2%) x 2 weeks     1/8 (updated 1/10)  Problem Inadequate oral intake   Etiology N/V, abdominal pain, decreased appetite   Signs/Symptoms Patient's wife reports <50% of usual PO intake x 3 weeks prior to admission; NPO/Clear liquids x 3 days during this admission (4 days total)   Status: ongoing      Intervention   Intervention: Follow treatment progress, Care plan reviewed, Encourage intake    Goal:   General: Nutrition support treatment  PO: Tolerate PO, Increase intake, Advace diet as medically appropriate    Monitoring/Evaluation:       Monitoring/Evaluation: Per protocol, I&O, PO intake, Supplement intake, Weight, Symptoms  Will Continue to follow per protocol  Liset De Jesus RD  Time Spent: 20 min

## 2020-01-10 NOTE — PROGRESS NOTES
"General Surgery Post op Follow Up Note    Subjective:   Feeling much better today.    /68 (BP Location: Left arm, Patient Position: Lying)   Pulse 94   Temp 98.3 °F (36.8 °C) (Oral)   Resp 16   Ht 180.3 cm (71\")   Wt 113 kg (249 lb)   SpO2 95%   BMI 34.73 kg/m²     General Appearance:  in no acute distress  Abdomen: incision is clean and dry, x3.  VEL benign.    CBC  Results from last 7 days   Lab Units 01/10/20  0429   WBC 10*3/mm3 7.35   HEMOGLOBIN g/dL 11.5*   HEMATOCRIT % 33.8*   PLATELETS 10*3/mm3 115*       CMP/BMP  Results from last 7 days   Lab Units 01/10/20  0428   SODIUM mmol/L 133*   POTASSIUM mmol/L 4.3   CHLORIDE mmol/L 98   CO2 mmol/L 24.0   BUN mg/dL 22   CREATININE mg/dL 1.43*   CALCIUM mg/dL 8.5*   BILIRUBIN mg/dL 0.6   ALK PHOS U/L 54   ALT (SGPT) U/L 20   AST (SGOT) U/L 30   GLUCOSE mg/dL 109*         ASSESSMENT/PLAN:  S/p lap sunil with IOC.   S/p ERC with stone extraction.    Appropriate low-fat diet as tolerated.  Remove the dressings. DC the VEL drain.  On anticoagulation.  Repeat labs in the morning.  Will defer abx to medicine    Arnaldo Gan MD  1/10/2020  11:50 AM  "

## 2020-01-10 NOTE — PROGRESS NOTES
Continued Stay Note  [facility]     Patient Name: Miguel Angel Crane  MRN: [id number]  Today's Date: 1/10/2020    Admit Date: 1/7/2020    Discharge Plan     Row Name 01/10/20 1444       Plan    Plan Comments  Per PT/OT notes, rehab not needed at [facility].        Discharge Codes    No documentation.       Expected Discharge Date and Time     Expected Discharge Date Expected Discharge Time    Jan 8, 2020             Apoorva Durand RN

## 2020-01-11 VITALS
BODY MASS INDEX: 34.86 KG/M2 | SYSTOLIC BLOOD PRESSURE: 99 MMHG | RESPIRATION RATE: 16 BRPM | TEMPERATURE: 99.5 F | WEIGHT: 249 LBS | HEIGHT: 71 IN | DIASTOLIC BLOOD PRESSURE: 70 MMHG | OXYGEN SATURATION: 98 % | HEART RATE: 73 BPM

## 2020-01-11 PROBLEM — K81.0 ACUTE CHOLECYSTITIS: Status: RESOLVED | Noted: 2020-01-07 | Resolved: 2020-01-11

## 2020-01-11 PROBLEM — K80.50 CHOLEDOCHOLITHIASIS: Status: RESOLVED | Noted: 2020-01-09 | Resolved: 2020-01-11

## 2020-01-11 LAB
ALBUMIN SERPL-MCNC: 3.7 G/DL (ref 3.5–5.2)
ALBUMIN/GLOB SERPL: 1.8 G/DL
ALP SERPL-CCNC: 51 U/L (ref 39–117)
ALT SERPL W P-5'-P-CCNC: 16 U/L (ref 1–41)
ANION GAP SERPL CALCULATED.3IONS-SCNC: 9 MMOL/L (ref 5–15)
AST SERPL-CCNC: 21 U/L (ref 1–40)
BACTERIA FLD CULT: ABNORMAL
BACTERIA FLD CULT: ABNORMAL
BILIRUB SERPL-MCNC: 0.6 MG/DL (ref 0.2–1.2)
BUN BLD-MCNC: 27 MG/DL (ref 8–23)
BUN/CREAT SERPL: 18.5 (ref 7–25)
CALCIUM SPEC-SCNC: 8.7 MG/DL (ref 8.6–10.5)
CHLORIDE SERPL-SCNC: 102 MMOL/L (ref 98–107)
CO2 SERPL-SCNC: 27 MMOL/L (ref 22–29)
CREAT BLD-MCNC: 1.46 MG/DL (ref 0.76–1.27)
DEPRECATED RDW RBC AUTO: 50.2 FL (ref 37–54)
ERYTHROCYTE [DISTWIDTH] IN BLOOD BY AUTOMATED COUNT: 14 % (ref 12.3–15.4)
GFR SERPL CREATININE-BSD FRML MDRD: 46 ML/MIN/1.73
GLOBULIN UR ELPH-MCNC: 2.1 GM/DL
GLUCOSE BLD-MCNC: 104 MG/DL (ref 65–99)
GRAM STN SPEC: ABNORMAL
HCT VFR BLD AUTO: 34.2 % (ref 37.5–51)
HGB BLD-MCNC: 11.7 G/DL (ref 13–17.7)
MCH RBC QN AUTO: 33.4 PG (ref 26.6–33)
MCHC RBC AUTO-ENTMCNC: 34.2 G/DL (ref 31.5–35.7)
MCV RBC AUTO: 97.7 FL (ref 79–97)
PLATELET # BLD AUTO: 115 10*3/MM3 (ref 140–450)
PMV BLD AUTO: 10.3 FL (ref 6–12)
POTASSIUM BLD-SCNC: 4.1 MMOL/L (ref 3.5–5.2)
PROT SERPL-MCNC: 5.8 G/DL (ref 6–8.5)
RBC # BLD AUTO: 3.5 10*6/MM3 (ref 4.14–5.8)
SODIUM BLD-SCNC: 138 MMOL/L (ref 136–145)
WBC NRBC COR # BLD: 4.92 10*3/MM3 (ref 3.4–10.8)

## 2020-01-11 PROCEDURE — 63710000001 TAMSULOSIN 0.4 MG CAPSULE: Performed by: INTERNAL MEDICINE

## 2020-01-11 PROCEDURE — A9270 NON-COVERED ITEM OR SERVICE: HCPCS | Performed by: INTERNAL MEDICINE

## 2020-01-11 PROCEDURE — 63710000001 LEVOTHYROXINE 50 MCG TABLET: Performed by: INTERNAL MEDICINE

## 2020-01-11 PROCEDURE — 85027 COMPLETE CBC AUTOMATED: CPT | Performed by: SURGERY

## 2020-01-11 PROCEDURE — 63710000001 AMOXICILLIN-CLAVULANATE 875-125 MG TABLET: Performed by: INTERNAL MEDICINE

## 2020-01-11 PROCEDURE — 99217 PR OBSERVATION CARE DISCHARGE MANAGEMENT: CPT | Performed by: FAMILY MEDICINE

## 2020-01-11 PROCEDURE — 80053 COMPREHEN METABOLIC PANEL: CPT | Performed by: SURGERY

## 2020-01-11 PROCEDURE — 63710000001 APIXABAN 5 MG TABLET: Performed by: INTERNAL MEDICINE

## 2020-01-11 PROCEDURE — G0378 HOSPITAL OBSERVATION PER HR: HCPCS

## 2020-01-11 RX ORDER — HYDROCODONE BITARTRATE AND ACETAMINOPHEN 10; 325 MG/1; MG/1
1 TABLET ORAL EVERY 4 HOURS PRN
Qty: 25 TABLET | Refills: 0 | Status: SHIPPED | OUTPATIENT
Start: 2020-01-11 | End: 2020-01-20

## 2020-01-11 RX ORDER — AMOXICILLIN AND CLAVULANATE POTASSIUM 875; 125 MG/1; MG/1
1 TABLET, FILM COATED ORAL EVERY 12 HOURS SCHEDULED
Qty: 9 TABLET | Refills: 0 | Status: SHIPPED | OUTPATIENT
Start: 2020-01-11 | End: 2020-01-16

## 2020-01-11 RX ADMIN — LEVOTHYROXINE SODIUM 50 MCG: 50 TABLET ORAL at 05:43

## 2020-01-11 RX ADMIN — TAMSULOSIN HYDROCHLORIDE 0.8 MG: 0.4 CAPSULE ORAL at 09:23

## 2020-01-11 RX ADMIN — SODIUM CHLORIDE, PRESERVATIVE FREE 10 ML: 5 INJECTION INTRAVENOUS at 09:24

## 2020-01-11 RX ADMIN — APIXABAN 5 MG: 5 TABLET, FILM COATED ORAL at 09:23

## 2020-01-11 RX ADMIN — AMOXICILLIN AND CLAVULANATE POTASSIUM 1 TABLET: 875; 125 TABLET, FILM COATED ORAL at 09:23

## 2020-01-11 NOTE — DISCHARGE INSTRUCTIONS
Apixaban oral tablets  What is this medicine?  APIXABAN (a PIX a ban) is an anticoagulant (blood thinner). It is used to lower the chance of stroke in people with a medical condition called atrial fibrillation. It is also used to treat or prevent blood clots in the lungs or in the veins.  This medicine may be used for other purposes; ask your health care provider or pharmacist if you have questions.  COMMON BRAND NAME(S): Eliquis  What should I tell my health care provider before I take this medicine?  They need to know if you have any of these conditions:  -antiphospholipid antibody syndrome  -bleeding disorders  -bleeding in the brain  -blood in your stools (black or tarry stools) or if you have blood in your vomit  -history of blood clots  -history of stomach bleeding  -kidney disease  -liver disease  -mechanical heart valve  -an unusual or allergic reaction to apixaban, other medicines, foods, dyes, or preservatives  -pregnant or trying to get pregnant  -breast-feeding  How should I use this medicine?  Take this medicine by mouth with a glass of water. Follow the directions on the prescription label. You can take it with or without food. If it upsets your stomach, take it with food. Take your medicine at regular intervals. Do not take it more often than directed. Do not stop taking except on your doctor's advice. Stopping this medicine may increase your risk of a blood clot. Be sure to refill your prescription before you run out of medicine.  Talk to your pediatrician regarding the use of this medicine in children. Special care may be needed.  Overdosage: If you think you have taken too much of this medicine contact a poison control center or emergency room at once.  NOTE: This medicine is only for you. Do not share this medicine with others.  What if I miss a dose?  If you miss a dose, take it as soon as you can. If it is almost time for your next dose, take only that dose. Do not take double or extra  doses.  What may interact with this medicine?  This medicine may interact with the following:  -aspirin and aspirin-like medicines  -certain medicines for fungal infections like ketoconazole and itraconazole  -certain medicines for seizures like carbamazepine and phenytoin  -certain medicines that treat or prevent blood clots like warfarin, enoxaparin, and dalteparin  -clarithromycin  -NSAIDs, medicines for pain and inflammation, like ibuprofen or naproxen  -rifampin  -ritonavir  -Palominas's wort  This list may not describe all possible interactions. Give your health care provider a list of all the medicines, herbs, non-prescription drugs, or dietary supplements you use. Also tell them if you smoke, drink alcohol, or use illegal drugs. Some items may interact with your medicine.  What should I watch for while using this medicine?  Visit your healthcare professional for regular checks on your progress. You may need blood work done while you are taking this medicine. Your condition will be monitored carefully while you are receiving this medicine. It is important not to miss any appointments.  Avoid sports and activities that might cause injury while you are using this medicine. Severe falls or injuries can cause unseen bleeding. Be careful when using sharp tools or knives. Consider using an electric razor. Take special care brushing or flossing your teeth. Report any injuries, bruising, or red spots on the skin to your healthcare professional.  If you are going to need surgery or other procedure, tell your healthcare professional that you are taking this medicine.  Wear a medical ID bracelet or chain. Carry a card that describes your disease and details of your medicine and dosage times.  What side effects may I notice from receiving this medicine?  Side effects that you should report to your doctor or health care professional as soon as possible:  -allergic reactions like skin rash, itching or hives, swelling of the  face, lips, or tongue  -signs and symptoms of bleeding such as bloody or black, tarry stools; red or dark-brown urine; spitting up blood or brown material that looks like coffee grounds; red spots on the skin; unusual bruising or bleeding from the eye, gums, or nose  -signs and symptoms of a blood clot such as chest pain; shortness of breath; pain, swelling, or warmth in the leg  -signs and symptoms of a stroke such as changes in vision; confusion; trouble speaking or understanding; severe headaches; sudden numbness or weakness of the face, arm or leg; trouble walking; dizziness; loss of coordination  This list may not describe all possible side effects. Call your doctor for medical advice about side effects. You may report side effects to FDA at 8-780-FDA-1540.  Where should I keep my medicine?  Keep out of the reach of children.  Store at room temperature between 20 and 25 degrees C (68 and 77 degrees F). Throw away any unused medicine after the expiration date.  NOTE: This sheet is a summary. It may not cover all possible information. If you have questions about this medicine, talk to your doctor, pharmacist, or health care provider.  © 2019 Elsevier/Gold Standard (2019-08-28 17:39:34)  Amoxicillin capsules or tablets  What is this medicine?  AMOXICILLIN (a mox i ANH in) is a penicillin antibiotic. It is used to treat certain kinds of bacterial infections. It will not work for colds, flu, or other viral infections.  This medicine may be used for other purposes; ask your health care provider or pharmacist if you have questions.  COMMON BRAND NAME(S): Amoxil, Moxilin, Sumox, Trimox  What should I tell my health care provider before I take this medicine?  They need to know if you have any of these conditions:  -asthma  -kidney disease  -an unusual or allergic reaction to amoxicillin, other penicillins, cephalosporin antibiotics, other medicines, foods, dyes, or preservatives  -pregnant or trying to get  pregnant  -breast-feeding  How should I use this medicine?  Take this medicine by mouth with a glass of water. Follow the directions on your prescription label. You may take this medicine with food or on an empty stomach. Take your medicine at regular intervals. Do not take your medicine more often than directed. Take all of your medicine as directed even if you think your are better. Do not skip doses or stop your medicine early.  Talk to your pediatrician regarding the use of this medicine in children. While this drug may be prescribed for selected conditions, precautions do apply.  Overdosage: If you think you have taken too much of this medicine contact a poison control center or emergency room at once.  NOTE: This medicine is only for you. Do not share this medicine with others.  What if I miss a dose?  If you miss a dose, take it as soon as you can. If it is almost time for your next dose, take only that dose. Do not take double or extra doses.  What may interact with this medicine?  -amiloride  -birth control pills  -chloramphenicol  -macrolides  -probenecid  -sulfonamides  -tetracyclines  This list may not describe all possible interactions. Give your health care provider a list of all the medicines, herbs, non-prescription drugs, or dietary supplements you use. Also tell them if you smoke, drink alcohol, or use illegal drugs. Some items may interact with your medicine.  What should I watch for while using this medicine?  Tell your doctor or health care professional if your symptoms do not improve in 2 or 3 days. Take all of the doses of your medicine as directed. Do not skip doses or stop your medicine early.  If you are diabetic, you may get a false positive result for sugar in your urine with certain brands of urine tests. Check with your doctor.  Do not treat diarrhea with over-the-counter products. Contact your doctor if you have diarrhea that lasts more than 2 days or if the diarrhea is severe and  watery.  What side effects may I notice from receiving this medicine?  Side effects that you should report to your doctor or health care professional as soon as possible:  -allergic reactions like skin rash, itching or hives, swelling of the face, lips, or tongue  -breathing problems  -dark urine  -redness, blistering, peeling or loosening of the skin, including inside the mouth  -seizures  -severe or watery diarrhea  -trouble passing urine or change in the amount of urine  -unusual bleeding or bruising  -unusually weak or tired  -yellowing of the eyes or skin  Side effects that usually do not require medical attention (report to your doctor or health care professional if they continue or are bothersome):  -dizziness  -headache  -stomach upset  -trouble sleeping  This list may not describe all possible side effects. Call your doctor for medical advice about side effects. You may report side effects to FDA at 1-630-FDA-5073.  Where should I keep my medicine?  Keep out of the reach of children.  Store between 68 and 77 degrees F (20 and 25 degrees C). Keep bottle closed tightly. Throw away any unused medicine after the expiration date.  NOTE: This sheet is a summary. It may not cover all possible information. If you have questions about this medicine, talk to your doctor, pharmacist, or health care provider.  © 2019 Elsevier/Gold Standard (2009-03-10 14:10:59)

## 2020-01-11 NOTE — DISCHARGE SUMMARY
[facility] Medicine Services  Clinical Decision Unit  DISCHARGE SUMMARY    Patient Name: Miguel Angel Crane  : 1932  MRN: [id number]    Admission Date and Time: 2020  6:43 AM  Discharge Date and Time:  20    Primary Care Physician: Yumiko Mata DO    Hospital Course     Active Hospital Problems    Diagnosis  POA   • PAF (paroxysmal atrial fibrillation) (CMS/HCC) [I48.0]  No   • Coronary artery disease [I25.10]  Yes   • Hypertension [I10]  Yes   • CKD (chronic kidney disease), stage II [N18.2]  Yes   • Obesity (BMI 30-39.9) [E66.9]  Yes      Resolved Hospital Problems    Diagnosis Date Resolved POA   • **Acute cholecystitis [K81.0] 2020 Yes   • Choledocholithiasis [K80.50] 2020 Yes          Brief CDU Course:  Miguel Angel Crane is a 87 y.o. male who presented with PMHx of HTN, hx of CVA, stable CAD/RAQUEL, hypothyroidism, BPH, and biliary colic presented to [facility] ED with 9/10 aching RUQ pain unrelieved by his home chronic pain medication for ~24-48hrs.  Has had similar pain for ~1 yr but over past 2 months has escalated in frequency and duration over time.      - S/p Zosyn during hospital stay now transitioned to Augmentin for remainder of course  - s/p lap CCY by Dr. Gan 20 and ERCP for distal CBD stone extraction 20  - New PAF diagnosed this hospitalization, his home amlodipine has been stopped and exchanged for trial of metoprolol however patient heart rate would not tolerate --> currently rate controlled without medications and BP's normotensive  - Chads vasc = 5 and warrants full anticoagulation with Eliquis      Key Discharge Recommendations:  - continue Eliquis anticoagulation for new dx PAF with CHADsVASc = 5  - f/u with PCP within 2 weeks of discharge  - f/u with Dr. Gan in 7 - 10 days  - f/u with [facility] in 1 month    Discharge Evaluation     Vital Signs:   Temp:  [97.9 °F (36.6 °C)-99.5 °F (37.5 °C)] 99.5 °F (37.5 °C)  Heart Rate:   [72-95] 95  Resp:  [16] 16  BP: ()/(52-82) 99/70     Physical Exam:  Constitutional: No acute distress, awake, alert, nontoxic, centrally obese body habitus  Respiratory: Clear to auscultation bilaterally, good effort, nonlabored respirations   Cardiovascular: RRR  Gastrointestinal: Positive bowel sounds, soft, vaguely but appropriately post-op tender, nondistended  Musculoskeletal: No peripheral edema, normal muscle tone for age  Psychiatric: Appropriate affect, good insight and judgement, cooperative      Discharge Medications and Follow-Up        Discharge Medications      New Medications      Instructions Start Date   amoxicillin-clavulanate 875-125 MG per tablet  Commonly known as:  AUGMENTIN   1 tablet, Oral, Every 12 Hours Scheduled      apixaban 5 MG tablet tablet  Commonly known as:  ELIQUIS   5 mg, Oral, Every 12 Hours Scheduled         Continue These Medications      Instructions Start Date   gabapentin 100 MG capsule  Commonly known as:  NEURONTIN   200 mg, Oral, Nightly      HYDROcodone-acetaminophen  MG per tablet  Commonly known as:  NORCO   1 tablet, Oral, Every 4 Hours PRN      levothyroxine 50 MCG tablet  Commonly known as:  SYNTHROID, LEVOTHROID   50 mcg, Oral, Daily      PRESERVISION AREDS 2 PO   1 tablet, Oral, 2 Times Daily      tamsulosin 0.4 MG capsule 24 hr capsule  Commonly known as:  FLOMAX   2 capsules, Oral, Daily         Stop These Medications    amLODIPine 5 MG tablet  Commonly known as:  NORVASC              No future appointments.        Time Spent on Discharge:  40 minutes    Electronically signed by Yuli Lombardi MD, 01/11/20, 9:29 AM.

## 2020-01-11 NOTE — PLAN OF CARE
VSS. No c/o pain throughout the shift. O2 2L NC administered while asleep, RA while awake. No complaints currently. Will continue to monitor.

## 2020-01-11 NOTE — PLAN OF CARE
Problem: Patient Care Overview  Goal: Plan of Care Review  Outcome: Outcome(s) achieved  Note:   D/c orders received . Will work on AVS  Goal: Individualization and Mutuality  Outcome: Outcome(s) achieved  Goal: Discharge Needs Assessment  Outcome: Outcome(s) achieved  Goal: Interprofessional Rounds/Family Conf  Outcome: Outcome(s) achieved     Problem: Fall Risk (Adult)  Goal: Identify Related Risk Factors and Signs and Symptoms  Outcome: Outcome(s) achieved  Goal: Absence of Fall  Outcome: Outcome(s) achieved     Problem: Pain, Chronic (Adult)  Goal: Identify Related Risk Factors and Signs and Symptoms  Outcome: Outcome(s) achieved  Goal: Acceptable Pain/Comfort Level and Functional Ability  Outcome: Outcome(s) achieved     Problem: Cholecystectomy (Adult)  Goal: Signs and Symptoms of Listed Potential Problems Will be Absent, Minimized or Managed (Cholecystectomy)  Outcome: Outcome(s) achieved  Goal: Anesthesia/Sedation Recovery  Outcome: Outcome(s) achieved     Problem: Skin Injury Risk (Adult)  Goal: Identify Related Risk Factors and Signs and Symptoms  Outcome: Outcome(s) achieved  Goal: Skin Health and Integrity  Outcome: Outcome(s) achieved

## 2020-01-11 NOTE — NURSING NOTE
Went over discharge instructions with patient. Had no additional questions and voiced understanding. IV catheter removed with tip intact. Telemetry removed. Patient left my care in stable condition.

## 2020-01-12 ENCOUNTER — READMISSION MANAGEMENT (OUTPATIENT)
Dept: CALL CENTER | Facility: HOSPITAL | Age: 85
End: 2020-01-12

## 2020-01-12 NOTE — OUTREACH NOTE
Prep Survey      Responses   Facility patient discharged from?  [facility]   Is patient eligible?  Yes   Discharge diagnosis  CHOLECYSTECTOMY LAPAROSCOPIC    Does the patient have one of the following disease processes/diagnoses(primary or secondary)?  General Surgery   Does the patient have Home health ordered?  No   Is there a DME ordered?  No   Prep survey completed?  Yes          Carolina Haro RN

## 2020-01-13 LAB
CYTO UR: NORMAL
LAB AP CASE REPORT: NORMAL
LAB AP CLINICAL INFORMATION: NORMAL
LAB AP DIAGNOSIS COMMENT: NORMAL
PATH REPORT.ADDENDUM SPEC: NORMAL
PATH REPORT.FINAL DX SPEC: NORMAL
PATH REPORT.GROSS SPEC: NORMAL

## 2020-01-14 ENCOUNTER — READMISSION MANAGEMENT (OUTPATIENT)
Dept: CALL CENTER | Facility: HOSPITAL | Age: 85
End: 2020-01-14

## 2020-01-14 NOTE — OUTREACH NOTE
General Surgery Week 1 Survey      Responses   Facility patient discharged from?  [facility]   Does the patient have one of the following disease processes/diagnoses(primary or secondary)?  General Surgery   Is there a successful TCM telephone encounter documented?  No   Week 1 attempt successful?  Yes   Call start time  1150   Call end time  1156   Discharge diagnosis  CHOLECYSTECTOMY LAPAROSCOPIC    Person spoke with today (if not patient) and relationship  Leigh-daughter   Meds reviewed with patient/caregiver?  Yes   Is the patient having any side effects they believe may be caused by any medication additions or changes?  No   Does the patient have all medications related to this admission filled (includes all antibiotics, pain medications, etc.)  Yes   Is the patient taking all medications as directed (includes completed medication regime)?  Yes   Does the patient have a follow up appointment scheduled with their surgeon?  Yes   Has the patient kept scheduled appointments due by today?  N/A   Psychosocial issues?  No   Nursing interventions  Educated on MyChart   What is the patient's perception of their health status since discharge?  Improving   Nursing interventions  Nurse provided patient education   Is the patient /caregiver able to teach back basic post-op care?  Lifting as instructed by MD in discharge instructions, Drive as instructed by MD in discharge instructions, No tub bath, swimming, or hot tub until instructed by MD, Take showers only when approved by MD-sponge bathe until then   Is the patient/caregiver able to teach back signs and symptoms of incisional infection?  Fever, Increased redness, swelling or pain at the incisonal site, Increased drainage or bleeding   Is the patient/caregiver able to teach back steps to recovery at home?  Set small, achievable goals for return to baseline health, Eat a well-balance diet   If the patient is a current smoker, are they able to teach back resources for  cessation?  -- [Pt is a nonsmoker]   Week 1 call completed?  Yes          Carolina Haro RN

## 2020-01-21 ENCOUNTER — READMISSION MANAGEMENT (OUTPATIENT)
Dept: CALL CENTER | Facility: HOSPITAL | Age: 85
End: 2020-01-21

## 2020-01-21 NOTE — OUTREACH NOTE
General Surgery Week 2 Survey      Responses   Facility patient discharged from?  [place]   Does the patient have one of the following disease processes/diagnoses(primary or secondary)?  General Surgery   Week 2 attempt successful?  Yes   Call start time  1517   Call end time  1518   Discharge diagnosis  CHOLECYSTECTOMY LAPAROSCOPIC    List who call center can speak with  wife   Meds reviewed with patient/caregiver?  Yes   Is the patient having any side effects they believe may be caused by any medication additions or changes?  No   Does the patient have all medications related to this admission filled (includes all antibiotics, pain medications, etc.)  Yes   Is the patient taking all medications as directed (includes completed medication regime)?  Yes   Does the patient have a follow up appointment scheduled with their surgeon?  Yes   Has the patient kept scheduled appointments due by today?  Yes   Psychosocial issues?  No   Did the patient receive a copy of their discharge instructions?  Yes   Nursing interventions  Reviewed instructions with patient   What is the patient's perception of their health status since discharge?  Improving   Nursing interventions  Nurse provided patient education   Is the patient /caregiver able to teach back basic post-op care?  Lifting as instructed by MD in discharge instructions, Drive as instructed by MD in discharge instructions, No tub bath, swimming, or hot tub until instructed by MD, Take showers only when approved by MD-sponge bathe until then   Is the patient/caregiver able to teach back signs and symptoms of incisional infection?  Fever, Increased redness, swelling or pain at the incisonal site, Increased drainage or bleeding   Is the patient/caregiver able to teach back steps to recovery at home?  Set small, achievable goals for return to baseline health, Eat a well-balance diet   Week 2 call completed?  Yes          Sultana Cha RN

## 2020-01-28 ENCOUNTER — READMISSION MANAGEMENT (OUTPATIENT)
Dept: CALL CENTER | Facility: HOSPITAL | Age: 85
End: 2020-01-28

## 2020-01-28 ENCOUNTER — APPOINTMENT (OUTPATIENT)
Dept: CT IMAGING | Facility: HOSPITAL | Age: 85
End: 2020-01-28

## 2020-01-28 ENCOUNTER — HOSPITAL ENCOUNTER (EMERGENCY)
Facility: HOSPITAL | Age: 85
Discharge: HOME | End: 2020-01-28
Attending: EMERGENCY MEDICINE | Admitting: EMERGENCY MEDICINE

## 2020-01-28 VITALS
BODY MASS INDEX: 38.51 KG/M2 | DIASTOLIC BLOOD PRESSURE: 76 MMHG | HEART RATE: 98 BPM | WEIGHT: 260 LBS | OXYGEN SATURATION: 97 % | HEIGHT: 69 IN | TEMPERATURE: 98.3 F | SYSTOLIC BLOOD PRESSURE: 136 MMHG | RESPIRATION RATE: 18 BRPM

## 2020-01-28 DIAGNOSIS — N18.9 CHRONIC KIDNEY DISEASE, UNSPECIFIED CKD STAGE: ICD-10-CM

## 2020-01-28 DIAGNOSIS — I10 ELEVATED BLOOD PRESSURE READING WITH DIAGNOSIS OF HYPERTENSION: ICD-10-CM

## 2020-01-28 DIAGNOSIS — Z98.890 POST-OPERATIVE STATE: ICD-10-CM

## 2020-01-28 DIAGNOSIS — R10.11 RIGHT UPPER QUADRANT ABDOMINAL PAIN: Primary | ICD-10-CM

## 2020-01-28 LAB
ALBUMIN SERPL-MCNC: 4.1 G/DL (ref 3.5–5.2)
ALBUMIN/GLOB SERPL: 1.7 G/DL
ALP SERPL-CCNC: 71 U/L (ref 39–117)
ALT SERPL W P-5'-P-CCNC: 13 U/L (ref 1–41)
ANION GAP SERPL CALCULATED.3IONS-SCNC: 10 MMOL/L (ref 5–15)
AST SERPL-CCNC: 17 U/L (ref 1–40)
BASOPHILS # BLD AUTO: 0.04 10*3/MM3 (ref 0–0.2)
BASOPHILS NFR BLD AUTO: 0.9 % (ref 0–1.5)
BILIRUB SERPL-MCNC: 0.7 MG/DL (ref 0.2–1.2)
BILIRUB UR QL STRIP: NEGATIVE
BUN BLD-MCNC: 13 MG/DL (ref 8–23)
BUN/CREAT SERPL: 8.7 (ref 7–25)
CALCIUM SPEC-SCNC: 9.1 MG/DL (ref 8.6–10.5)
CHLORIDE SERPL-SCNC: 103 MMOL/L (ref 98–107)
CLARITY UR: CLEAR
CO2 SERPL-SCNC: 28 MMOL/L (ref 22–29)
COLOR UR: YELLOW
CREAT BLD-MCNC: 1.5 MG/DL (ref 0.76–1.27)
D-LACTATE SERPL-SCNC: 1.3 MMOL/L (ref 0.5–2)
DEPRECATED RDW RBC AUTO: 50.7 FL (ref 37–54)
EOSINOPHIL # BLD AUTO: 0.26 10*3/MM3 (ref 0–0.4)
EOSINOPHIL NFR BLD AUTO: 5.7 % (ref 0.3–6.2)
ERYTHROCYTE [DISTWIDTH] IN BLOOD BY AUTOMATED COUNT: 14.5 % (ref 12.3–15.4)
GFR SERPL CREATININE-BSD FRML MDRD: 44 ML/MIN/1.73
GLOBULIN UR ELPH-MCNC: 2.4 GM/DL
GLUCOSE BLD-MCNC: 98 MG/DL (ref 65–99)
GLUCOSE UR STRIP-MCNC: NEGATIVE MG/DL
HCT VFR BLD AUTO: 33.8 % (ref 37.5–51)
HGB BLD-MCNC: 11.2 G/DL (ref 13–17.7)
HGB UR QL STRIP.AUTO: NEGATIVE
IMM GRANULOCYTES # BLD AUTO: 0.01 10*3/MM3 (ref 0–0.05)
IMM GRANULOCYTES NFR BLD AUTO: 0.2 % (ref 0–0.5)
KETONES UR QL STRIP: NEGATIVE
LEUKOCYTE ESTERASE UR QL STRIP.AUTO: NEGATIVE
LIPASE SERPL-CCNC: 13 U/L (ref 13–60)
LYMPHOCYTES # BLD AUTO: 1.72 10*3/MM3 (ref 0.7–3.1)
LYMPHOCYTES NFR BLD AUTO: 37.7 % (ref 19.6–45.3)
MCH RBC QN AUTO: 32.7 PG (ref 26.6–33)
MCHC RBC AUTO-ENTMCNC: 33.1 G/DL (ref 31.5–35.7)
MCV RBC AUTO: 98.5 FL (ref 79–97)
MONOCYTES # BLD AUTO: 0.42 10*3/MM3 (ref 0.1–0.9)
MONOCYTES NFR BLD AUTO: 9.2 % (ref 5–12)
NEUTROPHILS # BLD AUTO: 2.11 10*3/MM3 (ref 1.7–7)
NEUTROPHILS NFR BLD AUTO: 46.3 % (ref 42.7–76)
NITRITE UR QL STRIP: NEGATIVE
NRBC BLD AUTO-RTO: 0 /100 WBC (ref 0–0.2)
PH UR STRIP.AUTO: 8.5 [PH] (ref 5–8)
PLATELET # BLD AUTO: 129 10*3/MM3 (ref 140–450)
PMV BLD AUTO: 10.3 FL (ref 6–12)
POTASSIUM BLD-SCNC: 4.4 MMOL/L (ref 3.5–5.2)
PROT SERPL-MCNC: 6.5 G/DL (ref 6–8.5)
PROT UR QL STRIP: NEGATIVE
RBC # BLD AUTO: 3.43 10*6/MM3 (ref 4.14–5.8)
SODIUM BLD-SCNC: 141 MMOL/L (ref 136–145)
SP GR UR STRIP: 1.02 (ref 1–1.03)
UROBILINOGEN UR QL STRIP: ABNORMAL
WBC NRBC COR # BLD: 4.56 10*3/MM3 (ref 3.4–10.8)

## 2020-01-28 PROCEDURE — 96374 THER/PROPH/DIAG INJ IV PUSH: CPT

## 2020-01-28 PROCEDURE — 25010000002 KETOROLAC TROMETHAMINE PER 15 MG: Performed by: EMERGENCY MEDICINE

## 2020-01-28 PROCEDURE — 80053 COMPREHEN METABOLIC PANEL: CPT | Performed by: EMERGENCY MEDICINE

## 2020-01-28 PROCEDURE — 83605 ASSAY OF LACTIC ACID: CPT | Performed by: EMERGENCY MEDICINE

## 2020-01-28 PROCEDURE — 99284 EMERGENCY DEPT VISIT MOD MDM: CPT

## 2020-01-28 PROCEDURE — 85025 COMPLETE CBC W/AUTO DIFF WBC: CPT | Performed by: EMERGENCY MEDICINE

## 2020-01-28 PROCEDURE — 74176 CT ABD & PELVIS W/O CONTRAST: CPT

## 2020-01-28 PROCEDURE — 83690 ASSAY OF LIPASE: CPT | Performed by: EMERGENCY MEDICINE

## 2020-01-28 PROCEDURE — 25010000002 ONDANSETRON PER 1 MG: Performed by: EMERGENCY MEDICINE

## 2020-01-28 PROCEDURE — 96375 TX/PRO/DX INJ NEW DRUG ADDON: CPT

## 2020-01-28 PROCEDURE — 81003 URINALYSIS AUTO W/O SCOPE: CPT | Performed by: EMERGENCY MEDICINE

## 2020-01-28 RX ORDER — KETOROLAC TROMETHAMINE 15 MG/ML
15 INJECTION, SOLUTION INTRAMUSCULAR; INTRAVENOUS ONCE
Status: COMPLETED | OUTPATIENT
Start: 2020-01-28 | End: 2020-01-28

## 2020-01-28 RX ORDER — SODIUM CHLORIDE 0.9 % (FLUSH) 0.9 %
10 SYRINGE (ML) INJECTION AS NEEDED
Status: DISCONTINUED | OUTPATIENT
Start: 2020-01-28 | End: 2020-01-28 | Stop reason: HOSPADM

## 2020-01-28 RX ORDER — HYDROCODONE BITARTRATE AND ACETAMINOPHEN 5; 325 MG/1; MG/1
1 TABLET ORAL ONCE
Status: COMPLETED | OUTPATIENT
Start: 2020-01-28 | End: 2020-01-28

## 2020-01-28 RX ORDER — ONDANSETRON 8 MG/1
8 TABLET, ORALLY DISINTEGRATING ORAL EVERY 8 HOURS PRN
Qty: 15 TABLET | Refills: 0 | Status: SHIPPED | OUTPATIENT
Start: 2020-01-28 | End: 2020-02-21

## 2020-01-28 RX ORDER — FAMOTIDINE 10 MG/ML
20 INJECTION, SOLUTION INTRAVENOUS ONCE
Status: COMPLETED | OUTPATIENT
Start: 2020-01-28 | End: 2020-01-28

## 2020-01-28 RX ORDER — ONDANSETRON 2 MG/ML
4 INJECTION INTRAMUSCULAR; INTRAVENOUS ONCE
Status: COMPLETED | OUTPATIENT
Start: 2020-01-28 | End: 2020-01-28

## 2020-01-28 RX ORDER — DICYCLOMINE HYDROCHLORIDE 10 MG/1
20 CAPSULE ORAL ONCE
Status: COMPLETED | OUTPATIENT
Start: 2020-01-28 | End: 2020-01-28

## 2020-01-28 RX ADMIN — ONDANSETRON 4 MG: 2 INJECTION INTRAMUSCULAR; INTRAVENOUS at 12:03

## 2020-01-28 RX ADMIN — DICYCLOMINE HYDROCHLORIDE 20 MG: 10 CAPSULE ORAL at 12:03

## 2020-01-28 RX ADMIN — HYDROCODONE BITARTRATE AND ACETAMINOPHEN 1 TABLET: 5; 325 TABLET ORAL at 14:26

## 2020-01-28 RX ADMIN — FAMOTIDINE 20 MG: 10 INJECTION, SOLUTION INTRAVENOUS at 12:03

## 2020-01-28 RX ADMIN — SODIUM CHLORIDE, POTASSIUM CHLORIDE, SODIUM LACTATE AND CALCIUM CHLORIDE 1000 ML: 600; 310; 30; 20 INJECTION, SOLUTION INTRAVENOUS at 12:03

## 2020-01-28 RX ADMIN — KETOROLAC TROMETHAMINE 15 MG: 15 INJECTION, SOLUTION INTRAMUSCULAR; INTRAVENOUS at 12:03

## 2020-01-28 NOTE — OUTREACH NOTE
General Surgery Week 3 Survey      Responses   Facility patient discharged from?  [place]   Does the patient have one of the following disease processes/diagnoses(primary or secondary)?  General Surgery   Week 3 attempt successful?  Yes   Call start time  1728   Call end time  1730   Meds reviewed with patient/caregiver?  Yes   Is the patient taking all medications as directed (includes completed medication regime)?  Yes   Has the patient kept scheduled appointments due by today?  Yes   What is the patient's perception of their health status since discharge?  Worsening   Additional teach back comments  Was sent to the ER today by his surgeon due to continued pain and nausea.  Wife states that he got meds and hopefully they will help.   Week 3 call completed?  Yes          Apoorva Mays, RN

## 2020-02-05 ENCOUNTER — READMISSION MANAGEMENT (OUTPATIENT)
Dept: CALL CENTER | Facility: HOSPITAL | Age: 85
End: 2020-02-05

## 2020-02-05 NOTE — OUTREACH NOTE
General Surgery Week 4 Survey      Responses   Facility patient discharged from?  [place]   Does the patient have one of the following disease processes/diagnoses(primary or secondary)?  General Surgery   Week 4 attempt successful?  No          José Miguel Wen RN

## 2020-02-11 ENCOUNTER — OFFICE VISIT (OUTPATIENT)
Dept: NEUROSURGERY | Facility: CLINIC | Age: 85
End: 2020-02-11

## 2020-02-11 VITALS — BODY MASS INDEX: 38.51 KG/M2 | TEMPERATURE: 98.1 F | WEIGHT: 260 LBS | HEIGHT: 69 IN

## 2020-02-11 DIAGNOSIS — E66.9 OBESITY (BMI 30-39.9): Primary | ICD-10-CM

## 2020-02-11 DIAGNOSIS — M48.061 SPINAL STENOSIS OF LUMBAR REGION, UNSPECIFIED WHETHER NEUROGENIC CLAUDICATION PRESENT: ICD-10-CM

## 2020-02-11 DIAGNOSIS — M54.50 CHRONIC RIGHT-SIDED LOW BACK PAIN WITHOUT SCIATICA: ICD-10-CM

## 2020-02-11 DIAGNOSIS — G89.29 CHRONIC RIGHT-SIDED LOW BACK PAIN WITHOUT SCIATICA: ICD-10-CM

## 2020-02-11 DIAGNOSIS — M51.36 DEGENERATIVE DISC DISEASE, LUMBAR: ICD-10-CM

## 2020-02-11 PROCEDURE — 99214 OFFICE O/P EST MOD 30 MIN: CPT | Performed by: PHYSICIAN ASSISTANT

## 2020-02-11 RX ORDER — FLUTICASONE PROPIONATE 50 MCG
SPRAY, SUSPENSION (ML) NASAL
COMMUNITY
Start: 2019-12-20

## 2020-02-11 NOTE — PROGRESS NOTES
Subjective   Patient: Miguel Angel Crane  : 1932  Chart #: [id number]    Date of Service: 2020    Chief Complaint   Patient presents with   • Back Pain   • Leg Pain     HPI  87-year-old gentleman referred for leg and knee pain that is been going on for a year.  He reports constant pain it starts from the side and radiates down his back to the leg and stops at the knee.  He has associated numbness, his pain is worse with standing, he is currently on Neurontin and hydrocodone which helps some.  He denies any falls.  Bladder function is at baseline.  He previously had a lumbar laminectomy at L3-4 for spinal stenosis in 2016.  He recently had gallbladder surgery, 2020.  He was recently seen back in the emergency room on 2024 right upper quadrant pain.  He is scheduled to see his doctor regarding his heart and heart rate.      Past Medical History:   Diagnosis Date   • Arthritis    • BPH (benign prostatic hyperplasia)    • Cataracts, both eyes    • Chronic constipation    • CKD (chronic kidney disease), stage II    • Coronary artery disease    • Hypertension    • Hypothyroidism    • Kidney stones    • Low back pain    • Mass of pancreas    • Myocardial infarct (CMS/HCC)    • PAF (paroxysmal atrial fibrillation) (CMS/HCC) 2020    New diagnosis 2020   • Stroke (CMS/[facility])     left MCA, secondary to left carotid stenosis       Allergies   Allergen Reactions   • Oxycodone GI Intolerance     constipation         Current Outpatient Medications:   •  apixaban (ELIQUIS) 5 MG tablet tablet, Take 1 tablet by mouth Every 12 (Twelve) Hours., Disp: 60 tablet, Rfl: 1  •  fluticasone (FLONASE) 50 MCG/ACT nasal spray, USE 2 SPRAY(S) IN EACH NOSTRIL ONCE DAILY AT BEDTIME, Disp: , Rfl:   •  HYDROcodone-acetaminophen (HYCET) 7.5-325 MG/15ML solution, Take 5 mL by mouth Every 4 (Four) Hours As Needed for Mild Pain  or Moderate Pain ., Disp: 30 mL, Rfl: 0  •  levothyroxine (SYNTHROID, LEVOTHROID) 50 MCG  tablet, Take 50 mcg by mouth daily., Disp: , Rfl:   •  magnesium hydroxide (MILK OF MAGNESIA) 2400 MG/10ML suspension suspension, Take 10 mL by mouth Daily As Needed., Disp: , Rfl:   •  Multiple Vitamins-Minerals (PRESERVISION AREDS 2 PO), Take 1 tablet by mouth 2 (Two) Times a Day., Disp: , Rfl:   •  PROMETHAZINE HCL PO, Take  by mouth., Disp: , Rfl:   •  tamsulosin (FLOMAX) 0.4 MG capsule 24 hr capsule, Take 2 capsules by mouth Daily., Disp: , Rfl:   •  gabapentin (NEURONTIN) 100 MG capsule, Take 200 mg by mouth Every Night., Disp: , Rfl:   •  hyoscyamine (LEVSIN) 0.125 MG SL tablet, Take 1 tablet by mouth Every 4 (Four) Hours As Needed for Cramping., Disp: 15 tablet, Rfl: 0  •  ondansetron ODT (ZOFRAN-ODT) 8 MG disintegrating tablet, Take 1 tablet by mouth Every 8 (Eight) Hours As Needed for Nausea or Vomiting., Disp: 15 tablet, Rfl: 0    Social History     Socioeconomic History   • Marital status:      Spouse name: Not on file   • Number of children: Not on file   • Years of education: Not on file   • Highest education level: Not on file   Tobacco Use   • Smoking status: Former Smoker     Packs/day: 0.50     Years: 42.00     Pack years: 21.00     Last attempt to quit:      Years since quittin.1   • Smokeless tobacco: Never Used   • Tobacco comment: quit 24 years ago   Substance and Sexual Activity   • Alcohol use: No     Comment: 1988   • Drug use: No   • Sexual activity: Defer       Family History   Problem Relation Age of Onset   • Heart disease Mother    • Cancer Mother    • Heart disease Father    • Cancer Father        Review of Systems   Constitutional: Positive for appetite change and unexpected weight change. Negative for activity change, chills, diaphoresis, fatigue and fever.   HENT: Positive for postnasal drip. Negative for congestion, dental problem, drooling, ear discharge, ear pain, facial swelling, hearing loss, mouth sores, nosebleeds, rhinorrhea, sinus pressure, sinus pain,  "sneezing, sore throat, tinnitus, trouble swallowing and voice change.    Eyes: Negative for photophobia, pain, discharge, redness, itching and visual disturbance.   Respiratory: Positive for shortness of breath. Negative for apnea, cough, choking, chest tightness, wheezing and stridor.    Cardiovascular: Positive for palpitations. Negative for chest pain and leg swelling.   Gastrointestinal: Positive for abdominal pain. Negative for abdominal distention, anal bleeding, blood in stool, constipation, diarrhea, nausea, rectal pain and vomiting.   Endocrine: Negative for cold intolerance, heat intolerance, polydipsia, polyphagia and polyuria.   Genitourinary: Positive for flank pain. Negative for decreased urine volume, difficulty urinating, discharge, dysuria, enuresis, frequency, genital sores, hematuria, penile pain, penile swelling, scrotal swelling, testicular pain and urgency.   Musculoskeletal: Positive for arthralgias and back pain. Negative for gait problem, joint swelling, myalgias, neck pain and neck stiffness.   Skin: Negative for color change, pallor, rash and wound.   Allergic/Immunologic: Negative for environmental allergies, food allergies and immunocompromised state.   Neurological: Positive for numbness. Negative for dizziness, tremors, seizures, syncope, facial asymmetry, speech difficulty, weakness, light-headedness and headaches.   Hematological: Negative for adenopathy. Does not bruise/bleed easily.   Psychiatric/Behavioral: Negative for agitation, behavioral problems, confusion, decreased concentration, dysphoric mood, hallucinations, self-injury, sleep disturbance and suicidal ideas. The patient is not nervous/anxious and is not hyperactive.          Physical examination:  Temperature 98.1 °F (36.7 °C), height 175.3 cm (69\"), weight 118 kg (260 lb).    HEENT- normocephalic, atraumatic, sclera clear  Lungs-normal expansion, no wheezing  Heart-regular rate and rhythm  Extremities-positive pulses, " no edema    Neurologic Exam  WDWN  A/A/C, speech clear, attention normal, conversant, answers questions appropriately, good historian.  CN II - XII intact.  Motor examination does not reveal weakness in the , upper or lower extremities.   Sensation is intact.  No tremors are noted.  Reflexes are absent.    Radiographic Imaging:  CT the abdomen pelvis is reviewed showing calcification of the abdominal aorta, diffuse osteopenia and degenerative changes to the pubic symphysis and SI joints as well as the thoracolumbar spine.    Medical Decision Makin.  Severe degenerative disc disease in the thoraco-lumbar spine.  2.  Degenerative osteoarthritis  3.  Degenerative SI joints  4.  Chronic low back pain-Dr. Richmond has provided a small dose of Percocet for his chronic degenerative pain.    Shyanne Mack PA-C        Patient Care Team:  Yumiko Mata DO as PCP - General (Family Medicine)

## 2020-02-19 PROBLEM — M54.50 LOW BACK PAIN: Status: ACTIVE | Noted: 2020-02-19

## 2020-02-20 NOTE — PROGRESS NOTES
OFFICE VISIT  NOTE  [facility] CARDIOLOGY      Name: Miguel Angel Crane    Date: 2020  MRN:  [id number]  :  1932      REFERRING/PRIMARY PROVIDER:  No ref. provider found    Chief Complaint   Patient presents with   • Coronary Artery Disease       HPI: Miguel Angel Crane is a 87 y.o. male who presents today for hospital follow-up for paroxysmal atrial fibrillation.  Patient was admitted 2020 abdominal pain, found to have acute cholecystitis, underwent laparoscopic cholecystectomy, and ERCP for choledocholithiasis.  His associate history of CKD stage III, hypertension, hypothyroidism, kidney stones, previous history of left MCA stroke status post left carotid stent by Dr. Isaak Kellogg .  Patient has done well since discharge.  He was started on Eliquis 5 mg twice daily after discharge, no bleeding complications.  He was tried on metoprolol during his hospital stay but had bradycardia therefore was sent home with no rate control and was actually in sinus rhythm at discharge, spontaneously converted.  Denies palpitations chest pain or shortness of breath.  Main complaint is low back pain which requires hydrocodone.    Past Medical History:   Diagnosis Date   • Arthritis    • BPH (benign prostatic hyperplasia)    • Cataracts, both eyes    • Chronic constipation    • CKD (chronic kidney disease), stage II    • Coronary artery disease    • Hypertension    • Hypothyroidism    • Kidney stones    • Low back pain    • Mass of pancreas    • Myocardial infarct (CMS/HCC)    • PAF (paroxysmal atrial fibrillation) (CMS/HCC) 2020    New diagnosis 2020   • Stroke (CMS/HCC)     left MCA, secondary to left carotid stenosis       Past Surgical History:   Procedure Laterality Date   • CAROTID STENT Left 2016    sx with prior CVA   • CATARACT EXTRACTION W/ INTRAOCULAR LENS  IMPLANT, BILATERAL Bilateral    • CHOLECYSTECTOMY N/A 2020    Procedure: CHOLECYSTECTOMY LAPAROSCOPIC  WITH IOC ;  Surgeon: Arnaldo Gan MD;  Location:  [facility];  Service: General   • CYSTOSCOPY URETEROSCOPY Right 2019    Procedure: CYSTOSCOPY RIGHT URETERSCOPY RIGHT URETERAL STENT PLACEMENT;  Surgeon: Britton Saba MD;  Location:  [facility];  Service: Urology   • ERCP N/A 2020    Procedure: ENDOSCOPIC RETROGRADE CHOLANGIOPANCREATOGRAPHY WITH CANNULATION, BALLOON SWEEP, AND STONE REMOVAL;  Surgeon: Fredy Schaffer MD;  Location:  [facility];  Service: Gastroenterology   • EXTRACORPOREAL SHOCKWAVE LITHOTRIPSY (ESWL), STENT INSERTION/REMOVAL Right 11/15/2019    Procedure: EXTRACORPOREAL SHOCKWAVE LITHOTRIPSY WITH  STENT REMOVAL;  Surgeon: Britton Saba MD;  Location:  [facility];  Service: Urology   • LUMBAR LAMINECTOMY DISCECTOMY DECOMPRESSION N/A 10/14/2016    Procedure: LUMBAR LAMINECTOMY  L3-L4;  Surgeon: George Richmond MD;  Location:  [facility];  Service:    • TONSILLECTOMY         Social History     Socioeconomic History   • Marital status:      Spouse name: Not on file   • Number of children: Not on file   • Years of education: Not on file   • Highest education level: Not on file   Tobacco Use   • Smoking status: Former Smoker     Packs/day: 0.50     Years: 42.00     Pack years: 21.00     Last attempt to quit:      Years since quittin.1   • Smokeless tobacco: Never Used   • Tobacco comment: quit 24 years ago   Substance and Sexual Activity   • Alcohol use: No     Comment: 1988   • Drug use: No   • Sexual activity: Defer       Family History   Problem Relation Age of Onset   • Heart disease Mother    • Cancer Mother    • Heart disease Father    • Cancer Father         ROS:   Constitutional no fever,  no weight loss   Skin no rash, no subcutaneous nodules   Otolaryngeal no difficulty swallowing   Cardiovascular See HPI   Pulmonary no cough, no sputum production   Gastrointestinal no constipation, no diarrhea   Genitourinary no dysuria, no  "hematuria   Hematologic no easy bruisability, no abnormal bleeding   Musculoskeletal no muscle pain   Neurologic no dizziness, no falls         Allergies   Allergen Reactions   • Oxycodone GI Intolerance     constipation         Current Outpatient Medications:   •  apixaban (ELIQUIS) 5 MG tablet tablet, Take 1 tablet by mouth Every 12 (Twelve) Hours., Disp: 60 tablet, Rfl: 1  •  fluticasone (FLONASE) 50 MCG/ACT nasal spray, USE 2 SPRAY(S) IN EACH NOSTRIL ONCE DAILY AT BEDTIME, Disp: , Rfl:   •  HYDROcodone-acetaminophen (HYCET) 7.5-325 MG/15ML solution, Take 5 mL by mouth Every 4 (Four) Hours As Needed for Mild Pain  or Moderate Pain . (Patient taking differently: Take 5 mL by mouth 2 (Two) Times a Day.), Disp: 30 mL, Rfl: 0  •  levothyroxine (SYNTHROID, LEVOTHROID) 50 MCG tablet, Take 50 mcg by mouth daily., Disp: , Rfl:   •  magnesium hydroxide (MILK OF MAGNESIA) 2400 MG/10ML suspension suspension, Take 10 mL by mouth Daily As Needed., Disp: , Rfl:   •  tamsulosin (FLOMAX) 0.4 MG capsule 24 hr capsule, Take 2 capsules by mouth Daily., Disp: , Rfl:     Vitals:    02/21/20 1303   BP: 110/62   BP Location: Right arm   Patient Position: Sitting   Pulse: 101   SpO2: 93%   Weight: 108 kg (239 lb)   Height: 181.6 cm (71.5\")     Body mass index is 32.87 kg/m².    PHYSICAL EXAM:    General Appearance:   · well developed  · well nourished  HENT:   · oropharynx moist  · lips not cyanotic  Neck:  · thyroid not enlarged  · supple  Respiratory:  · no respiratory distress  · normal breath sounds  · no rales  Cardiovascular:  · no jugular venous distention  · regular rhythm  · apical impulse normal  · S1 normal, S2 normal  · no S3, no S4   · no murmur  · no rub, no thrill  · carotid pulses normal; no bruit  · pedal pulses normal  · lower extremity edema: none    Gastrointestinal:   · bowel sounds normal  · non-tender  · no hepatomegaly, no splenomegaly  Musculoskeletal:  · no clubbing of fingers.   · normocephalic, head " atraumatic  Skin:   · warm, dry  Psychiatric:  · judgement and insight appropriate  · normal mood and affect    RESULTS:     ECG 12 Lead  Date/Time: 2/21/2020 2:00 PM  Performed by: Zack Kruger MD  Authorized by: Zack Kruger MD   Comparison: compared with previous ECG from 1/12/2020  Similar to previous ECG  Rhythm: sinus tachycardia  Rate: tachycardic  BPM: 103  QRS axis: normal    Clinical impression: non-specific ECG            Results for orders placed during the hospital encounter of 01/07/20   Adult Transthoracic Echo Complete W/ Cont if Necessary Per Protocol    Narrative · Left ventricular systolic function is normal. Calculated EF = 62.0%.   Estimated EF appears to be in the range of 56 - 60%  · Left ventricular wall thickness is consistent with mild to moderate   septal asymmetric hypertrophy.  · Left ventricular diastolic dysfunction is noted (grade I) consistent   with impaired relaxation.  · Mild MAC is present. Trace mitral valve regurgitation is present.            Labs:  Lab Results   Component Value Date    AST 17 01/28/2020    ALT 13 01/28/2020     Lab Results   Component Value Date    HGBA1C 6.00 10/13/2016     No components found for: CREATINININE  eGFR Non  Amer   Date Value Ref Range Status   01/28/2020 44 (L) >60 mL/min/1.73 Final   01/11/2020 46 (L) >60 mL/min/1.73 Final   01/10/2020 47 (L) >60 mL/min/1.73 Final         ASSESSMENT:  Problem List Items Addressed This Visit        Cardiovascular and Mediastinum    Carotid stenosis, symptomatic, with infarction (CMS/HCC)    Overview     09/14/16 Carotid ultrasound  · No evidence of hemodynamically significant stenosis of bilateral carotid arteries.  · Mild bilateral scattered plaque is noted with patent left carotid stent.  · Elevated right ICA systolic velocities may be due to vessel tortuosity, however moderate stenosis cannot be excluded.         Hypertension    PAF (paroxysmal atrial fibrillation) (CMS/HCC) - Primary     Overview     01/08/20 Echo  · LVEF = 62.0%.  · Left ventricular diastolic dysfunction is noted (grade I) consistent with impaired relaxation.            Digestive    Obesity (BMI 30-39.9)          PLAN:    1.  Paroxysmal atrial fibrillation:  Spontaneous converted during hospitalization 1/2020 for acute cholecystitis  Continue Eliquis 5 mg twice daily, currently no bleeding complications, discussed risk and benefit of this therapy with patient and family, all in agreement to continue.  Patient is sinus tachycardia today with a heart rate of 103  Start metoprolol 12.5 mg twice daily    Echocardiogram dated 1/8/2020 reviewed, EF 60%, grade 1 diastolic dysfunction, moderate LVH    2.  Carotid stenosis, left carotid stenosis status post stent placement in 2012  Last carotid duplex 9/2016 showed no evidence of hemodynamically significant stenosis of bilateral carotids bilateral scattered plaque noted.    3.  Chronic diastolic heart failure, grade 1:  Start metoprolol 12.5 mg twice daily  Increase exercise, decrease sodium intake    Return to clinic in 3 months, or sooner as needed.    Thank you for the opportunity to share in the care of your patient; please do not hesitate to call me with any questions.     Zack Kruger MD, [facility]  Office: [phone] [address] [place] [address]

## 2020-02-21 ENCOUNTER — OFFICE VISIT (OUTPATIENT)
Dept: CARDIOLOGY | Facility: CLINIC | Age: 85
End: 2020-02-21

## 2020-02-21 VITALS
HEIGHT: 72 IN | DIASTOLIC BLOOD PRESSURE: 62 MMHG | WEIGHT: 239 LBS | SYSTOLIC BLOOD PRESSURE: 110 MMHG | BODY MASS INDEX: 32.37 KG/M2 | OXYGEN SATURATION: 93 % | HEART RATE: 101 BPM

## 2020-02-21 DIAGNOSIS — I48.0 PAF (PAROXYSMAL ATRIAL FIBRILLATION) (HCC): Primary | ICD-10-CM

## 2020-02-21 DIAGNOSIS — I63.239 CAROTID STENOSIS, SYMPTOMATIC, WITH INFARCTION (HCC): ICD-10-CM

## 2020-02-21 DIAGNOSIS — E66.9 OBESITY (BMI 30-39.9): ICD-10-CM

## 2020-02-21 DIAGNOSIS — I10 ESSENTIAL HYPERTENSION: ICD-10-CM

## 2020-02-21 PROCEDURE — 99214 OFFICE O/P EST MOD 30 MIN: CPT | Performed by: INTERNAL MEDICINE

## 2020-02-21 PROCEDURE — 93000 ELECTROCARDIOGRAM COMPLETE: CPT | Performed by: INTERNAL MEDICINE

## 2020-03-30 ENCOUNTER — TELEPHONE (OUTPATIENT)
Dept: NEUROSURGERY | Facility: CLINIC | Age: 85
End: 2020-03-30

## 2020-03-30 NOTE — TELEPHONE ENCOUNTER
Basilio left a script for this pt last week for Norco 10#40.spoke with pt, address verified and script mailed.

## 2020-03-30 NOTE — TELEPHONE ENCOUNTER
Called and spoke with the pt. He reports no new symptoms and states everything is the exact same as when he was last seen.

## 2020-03-30 NOTE — TELEPHONE ENCOUNTER
Provider: Basilio   Caller: pt's wife  Time of call:1187     Phone #: [phone]    Last visit: 02/11   Next visit: prn    TEGAN: 02/11/2020 Hydrocodone/Acetaminophen  325MG/10MG  04/09/1932 120 30 George Richmond  01/28/2020 Hydrocodone Bitartrate/Ac  325MG/15ML/7.5MG/15ML/6.7  04/09/1932 30 2 Twan Rolon  1/17/2020 Hydrocodone/Acetaminophen  325MG/10MG  04/09/1932 25 5 Britton Saba  01 /11/2020 Hydrocodone/Acetaminophen  325MG/10MG  04/09/1932 25 5 Yuli Lombardi        Reason for call: pt's wife called to request a refill of Hydrocodone 10. She states she was told at the last OV that we would refill this.

## 2020-03-31 NOTE — TELEPHONE ENCOUNTER
Dr. Richmond and Deanna are out of office this week. We will have to discuss with Dr. Richmond next week when he returns.

## 2020-03-31 NOTE — TELEPHONE ENCOUNTER
Pt's wife left msg stating Dr. Richmond and Deanna always send rx of # 120 to last 2 months.  Most recent rx was only for #40.  She would like to discuss this or have it corrected.

## 2020-04-14 ENCOUNTER — TELEPHONE (OUTPATIENT)
Dept: NEUROSURGERY | Facility: CLINIC | Age: 85
End: 2020-04-14

## 2020-04-14 NOTE — TELEPHONE ENCOUNTER
Provider:  Basilio  Caller: Self   Time of call:   955  Phone #:  [phone]  Surgery:    Surgery Date:    Last visit:     Next visit:     TEGAN:         Reason for call:   Patient called and stated that his insurance would not cover 90-day supply of Norco 325/7.5.  Therefore he was asking if he could have them split up in 30-day intervals.  The prescriptions have been written, signed and have been sent via mail on 4/14/2019.

## 2020-06-01 DIAGNOSIS — R10.11 RIGHT UPPER QUADRANT ABDOMINAL PAIN: ICD-10-CM

## 2020-06-01 DIAGNOSIS — Z98.890 POST-OPERATIVE STATE: ICD-10-CM

## 2020-06-01 NOTE — TELEPHONE ENCOUNTER
Provider:  Basilio  Caller: wife?  Time of call:   8:34  Phone #:  [phone]  Surgery:  Lumbar laminectomy L3-L4  Surgery Date:  10-14-16  Last visit:   02-11-20  Next visit: None    TEGAN:     02/11/2020 Hydrocodone/Acetaminophen  325MG/10MG  04/09/1932 120 [id number]  [place] 40 1  04/01/2020 Hydrocodone/Acetaminophen  325MG/10MG  04/09/1932 40 14 George Richmond [facility]-  [id number]  [place] 29 1  04/14/2020 Hydrocodone/Acetaminophen  325MG/7.5MG  04/09/1932 120 30 George Richmond [facility]-  [id number]  [place] 30 1  05/16/2020 Hydrocodone/Acetaminophen  325MG/7.5MG  04/09/1932 120 30 George Richmond [facility]-  [id number]  [facility] 30 1    Reason for call:     Patient requests a 3 month supply of Norco 7.5 mg.    She states that Dr. Richmond usually sends them 3 prescriptions at a time.

## 2020-06-02 ENCOUNTER — TELEPHONE (OUTPATIENT)
Dept: NEUROSURGERY | Facility: CLINIC | Age: 85
End: 2020-06-02

## 2020-06-02 NOTE — TELEPHONE ENCOUNTER
Please call the patient this afternoon and find out if they want the Norco 7.5 continued and the same instructions of 3 months at a time and that we mail them the prescriptions.  Let me know and we can provide.

## 2020-06-03 RX ORDER — HYDROCODONE BITARTRATE AND ACETAMINOPHEN 7.5; 325 MG/1; MG/1
1 TABLET ORAL EVERY 6 HOURS PRN
Qty: 120 TABLET | Refills: 0 | Status: SHIPPED | OUTPATIENT
Start: 2020-06-03

## 2020-06-15 NOTE — PROGRESS NOTES
OFFICE VISIT  NOTE  [facility] CARDIOLOGY      Name: Miguel Angel Crane    Date: 2020  MRN:  [id number]  :  1932      REFERRING/PRIMARY PROVIDER:  Yumiko Mata DO    Chief Complaint   Patient presents with   • Atrial Fibrillation       HPI: Miguel Angel Crane is a 88 y.o. male who presents today for follow-up for paroxysmal atrial fibrillation.  Patient was admitted 2020 abdominal pain, found to have acute cholecystitis, underwent laparoscopic cholecystectomy, and ERCP for choledocholithiasis.  His associate history of CKD stage III, hypertension, hypothyroidism, kidney stones, previous history of left MCA stroke status post left carotid stent by Dr. Isaak Kellogg .  Patient has done well since discharge.  He was started on Eliquis 5 mg twice daily after discharge, no bleeding complications.   Denies palpitations chest pain or shortness of breath.  Main complaint is low back pain which requires hydrocodone.  Denies bleeding complications with Eliquis.  Denies chest pain or shortness of breath.  Reports worsening low back pain.    Past Medical History:   Diagnosis Date   • Arthritis    • BPH (benign prostatic hyperplasia)    • Cataracts, both eyes    • Chronic constipation    • CKD (chronic kidney disease), stage II    • Coronary artery disease    • Hypertension    • Hypothyroidism    • Kidney stones    • Low back pain    • Mass of pancreas    • Myocardial infarct (CMS/HCC)    • PAF (paroxysmal atrial fibrillation) (CMS/HCC) 2020    New diagnosis 2020   • Stroke (CMS/HCC)     left MCA, secondary to left carotid stenosis       Past Surgical History:   Procedure Laterality Date   • CAROTID STENT Left 2016    sx with prior CVA   • CATARACT EXTRACTION W/ INTRAOCULAR LENS  IMPLANT, BILATERAL Bilateral    • CHOLECYSTECTOMY N/A 2020    Procedure: CHOLECYSTECTOMY LAPAROSCOPIC WITH IOC ;  Surgeon: Arnaldo Gan MD;  Location: [facility];  Service:  General   • CYSTOSCOPY URETEROSCOPY Right 2019    Procedure: CYSTOSCOPY RIGHT URETERSCOPY RIGHT URETERAL STENT PLACEMENT;  Surgeon: Britton Saba MD;  Location:  [facility];  Service: Urology   • ERCP N/A 2020    Procedure: ENDOSCOPIC RETROGRADE CHOLANGIOPANCREATOGRAPHY WITH CANNULATION, BALLOON SWEEP, AND STONE REMOVAL;  Surgeon: Fredy Schaffer MD;  Location:  [facility];  Service: Gastroenterology   • EXTRACORPOREAL SHOCKWAVE LITHOTRIPSY (ESWL), STENT INSERTION/REMOVAL Right 11/15/2019    Procedure: EXTRACORPOREAL SHOCKWAVE LITHOTRIPSY WITH  STENT REMOVAL;  Surgeon: Britton Saba MD;  Location:  [facility];  Service: Urology   • LUMBAR LAMINECTOMY DISCECTOMY DECOMPRESSION N/A 10/14/2016    Procedure: LUMBAR LAMINECTOMY  L3-L4;  Surgeon: George Richmond MD;  Location:  [facility];  Service:    • TONSILLECTOMY         Social History     Socioeconomic History   • Marital status:      Spouse name: Not on file   • Number of children: Not on file   • Years of education: Not on file   • Highest education level: Not on file   Tobacco Use   • Smoking status: Former Smoker     Packs/day: 0.50     Years: 42.00     Pack years: 21.00     Types: Cigarettes     Last attempt to quit:      Years since quittin.4   • Smokeless tobacco: Never Used   • Tobacco comment: quit 24 years ago   Substance and Sexual Activity   • Alcohol use: No     Comment: 1988   • Drug use: No   • Sexual activity: Defer       Family History   Problem Relation Age of Onset   • Heart disease Mother    • Cancer Mother    • Heart disease Father    • Cancer Father         ROS:   Constitutional no fever,  no weight loss   Skin no rash, no subcutaneous nodules   Otolaryngeal no difficulty swallowing   Cardiovascular See HPI   Pulmonary no cough, no sputum production   Gastrointestinal no constipation, no diarrhea   Genitourinary no dysuria, no hematuria   Hematologic no easy bruisability, no abnormal  "bleeding   Musculoskeletal no muscle pain   Neurologic no dizziness, no falls         Allergies   Allergen Reactions   • Oxycodone GI Intolerance     constipation         Current Outpatient Medications:   •  apixaban (ELIQUIS) 5 MG tablet tablet, Take 1 tablet by mouth Every 12 (Twelve) Hours., Disp: 180 tablet, Rfl: 3  •  fluticasone (FLONASE) 50 MCG/ACT nasal spray, USE 2 SPRAY(S) IN EACH NOSTRIL ONCE DAILY AT BEDTIME, Disp: , Rfl:   •  HYDROcodone-acetaminophen (NORCO) 7.5-325 MG per tablet, Take 1 tablet by mouth Every 6 (Six) Hours As Needed for Moderate Pain ., Disp: 120 tablet, Rfl: 0  •  levothyroxine (SYNTHROID, LEVOTHROID) 50 MCG tablet, Take 50 mcg by mouth daily., Disp: , Rfl:   •  magnesium hydroxide (MILK OF MAGNESIA) 2400 MG/10ML suspension suspension, Take 10 mL by mouth Daily As Needed., Disp: , Rfl:   •  metoprolol tartrate (LOPRESSOR) 25 MG tablet, Take 0.5 tablets by mouth 2 (Two) Times a Day., Disp: 90 tablet, Rfl: 3  •  Multiple Vitamins-Minerals (EYE VITAMINS PO), Take 1 tablet by mouth 2 (two) times a day., Disp: , Rfl:   •  tamsulosin (FLOMAX) 0.4 MG capsule 24 hr capsule, Take 1 capsule by mouth 2 (two) times a day., Disp: , Rfl:   •  triamcinolone (KENALOG) 0.025 % cream, APPLY CREAM TOPICALLY EVERY OTHER DAY TO RASH AREAS ON BODY ONLY IF NEEDED, Disp: , Rfl:     Vitals:    06/19/20 1430   BP: 112/64   BP Location: Left arm   Patient Position: Sitting   Pulse: 64   SpO2: 98%   Weight: 118 kg (260 lb)   Height: 180.3 cm (71\")     Body mass index is 36.26 kg/m².    PHYSICAL EXAM:    General Appearance:   · well developed  · well nourished  HENT:   · oropharynx moist  · lips not cyanotic  Neck:  · thyroid not enlarged  · supple  Respiratory:  · no respiratory distress  · normal breath sounds  · no rales  Cardiovascular:  · no jugular venous distention  · regular rhythm  · apical impulse normal  · S1 normal, S2 normal  · no S3, no S4   · no murmur  · no rub, no thrill  · carotid pulses normal; " no bruit  · pedal pulses normal  · lower extremity edema: none    Gastrointestinal:   · bowel sounds normal  · non-tender  · no hepatomegaly, no splenomegaly  Musculoskeletal:  · no clubbing of fingers.   · normocephalic, head atraumatic  Skin:   · warm, dry  Psychiatric:  · judgement and insight appropriate  · normal mood and affect    RESULTS:   Procedures    Results for orders placed during the hospital encounter of 01/07/20   Adult Transthoracic Echo Complete W/ Cont if Necessary Per Protocol    Narrative · Left ventricular systolic function is normal. Calculated EF = 62.0%.   Estimated EF appears to be in the range of 56 - 60%  · Left ventricular wall thickness is consistent with mild to moderate   septal asymmetric hypertrophy.  · Left ventricular diastolic dysfunction is noted (grade I) consistent   with impaired relaxation.  · Mild MAC is present. Trace mitral valve regurgitation is present.            Labs:  Lab Results   Component Value Date    AST 17 01/28/2020    ALT 13 01/28/2020     Lab Results   Component Value Date    HGBA1C 6.00 10/13/2016     No components found for: CREATINININE  eGFR Non  Amer   Date Value Ref Range Status   01/28/2020 44 (L) >60 mL/min/1.73 Final   01/11/2020 46 (L) >60 mL/min/1.73 Final   01/10/2020 47 (L) >60 mL/min/1.73 Final         ASSESSMENT:  Problem List Items Addressed This Visit        Cardiovascular and Mediastinum    Carotid stenosis, symptomatic, with infarction (CMS/[facility]) - Primary    Overview     09/14/16 Carotid ultrasound  · No evidence of hemodynamically significant stenosis of bilateral carotid arteries.  · Mild bilateral scattered plaque is noted with patent left carotid stent.  · Elevated right ICA systolic velocities may be due to vessel tortuosity, however moderate stenosis cannot be excluded.         Hypertension    Relevant Medications    metoprolol tartrate (LOPRESSOR) 25 MG tablet    PAF (paroxysmal atrial fibrillation) (CMS/HCC)    Overview      01/08/20 Echo  · LVEF = 62.0%.  · Left ventricular diastolic dysfunction is noted (grade I) consistent with impaired relaxation.         Relevant Medications    metoprolol tartrate (LOPRESSOR) 25 MG tablet          PLAN:    1.  Paroxysmal atrial fibrillation:  Spontaneous converted during hospitalization 1/2020 for acute cholecystitis  Continue Eliquis 5 mg twice daily, currently no bleeding complications, discussed risk and benefit of this therapy with patient and family, all in agreement to continue.  Sinus rhythm today, heart rate 72  Continue metoprolol 12.5 mg twice daily    Echocardiogram dated 1/8/2020 reviewed, EF 60%, grade 1 diastolic dysfunction, moderate LVH    2.  Carotid stenosis, left carotid stenosis status post stent placement in 2012  Last carotid duplex 9/2016 showed no evidence of hemodynamically significant stenosis of bilateral carotids bilateral scattered plaque noted.    3.  Chronic diastolic heart failure, grade 1:  Continue metoprolol 12.5 mg twice daily  Increase exercise, decrease sodium intake    4.  Chronic low back pain:  Continue follow-up with PCP and neurosurgery.    Return to clinic in 12 months via telephone    Thank you for the opportunity to share in the care of your patient; please do not hesitate to call me with any questions.     Zack Kruger MD, FACC  Office: [phone] [address] [facility]  [address] [place] [address]

## 2020-06-19 ENCOUNTER — OFFICE VISIT (OUTPATIENT)
Dept: CARDIOLOGY | Facility: CLINIC | Age: 85
End: 2020-06-19

## 2020-06-19 VITALS
DIASTOLIC BLOOD PRESSURE: 64 MMHG | BODY MASS INDEX: 36.4 KG/M2 | HEART RATE: 64 BPM | OXYGEN SATURATION: 98 % | SYSTOLIC BLOOD PRESSURE: 112 MMHG | HEIGHT: 71 IN | WEIGHT: 260 LBS

## 2020-06-19 DIAGNOSIS — I10 ESSENTIAL HYPERTENSION: ICD-10-CM

## 2020-06-19 DIAGNOSIS — I48.0 PAF (PAROXYSMAL ATRIAL FIBRILLATION) (HCC): ICD-10-CM

## 2020-06-19 DIAGNOSIS — I63.239 CAROTID STENOSIS, SYMPTOMATIC, WITH INFARCTION (HCC): Primary | ICD-10-CM

## 2020-06-19 PROCEDURE — 99214 OFFICE O/P EST MOD 30 MIN: CPT | Performed by: INTERNAL MEDICINE

## 2020-06-19 RX ORDER — TRIAMCINOLONE ACETONIDE 0.25 MG/G
CREAM TOPICAL
COMMUNITY
Start: 2020-06-03

## 2020-06-29 DIAGNOSIS — Z98.890 S/P LAMINECTOMY: Primary | ICD-10-CM

## 2020-06-29 RX ORDER — HYDROCODONE BITARTRATE AND ACETAMINOPHEN 7.5; 325 MG/1; MG/1
1 TABLET ORAL EVERY 6 HOURS PRN
Qty: 120 TABLET | Refills: 0 | Status: SHIPPED | OUTPATIENT
Start: 2020-06-29

## 2020-06-29 NOTE — TELEPHONE ENCOUNTER
Provider:  Basilio  Caller: wife  Time of call:   11:09  Phone #:  [phone]  Surgery:  Lumbar laminectomy L3-L4  Surgery Date:  10/14/16  Last visit:   02/11/20  Next visit: None    TEGAN:     04/14/2020 Hydrocodone/Acetaminophen  325MG/7.5MG  04/09/1932 [facility]  [place] [address] 1  05/16/2020 Hydrocodone/Acetaminophen  325MG/7.5MG  04/09/1932 120 30 George Richmond [facility] #[facility]-  0519  [place] 30 1  06/16/2020 Hydrocodone/Acetaminophen  325MG/7.5MG  04/09/1932 120 30 George Richmond [facility]-  0519  [facility] 30 1    Reason for call:     Patient's wife requests refill on Norco for 3 months to be mailed.  TY

## 2020-06-29 NOTE — TELEPHONE ENCOUNTER
Patient's wife called again regarding his Rx.  She was notified we will mail it as soon as it is signed.  Address verified.

## 2020-08-03 DIAGNOSIS — Z98.890 POST-OPERATIVE STATE: ICD-10-CM

## 2020-08-03 DIAGNOSIS — R10.11 RIGHT UPPER QUADRANT ABDOMINAL PAIN: ICD-10-CM

## 2020-08-03 RX ORDER — HYDROCODONE BITARTRATE AND ACETAMINOPHEN 10; 325 MG/1; MG/1
1 TABLET ORAL EVERY 6 HOURS PRN
Refills: 0 | Status: CANCELLED | OUTPATIENT
Start: 2020-08-03

## 2020-08-03 NOTE — TELEPHONE ENCOUNTER
Provider:  Basilio  Caller: wife  Time of call:  9:25   Phone #:  [phone]  Surgery:  Lumbar laminectomy L3-L4  Surgery Date:  10/14/16  Last visit:   02/11/20  Next visit: None    TEGAN:    04/14/2020 Hydrocodone/Acetaminophen  325MG/7.5MG  04/09/1932 120 30 George Richmond [facility]-  [address]19  [place] 30 1  05/16/2020 Hydrocodone/Acetaminophen  325MG/7.5MG  04/09/1932 120 30 George Richmond [facility] #[facility]-  0519  [place] 30 1  06/16/2020 Hydrocodone/Acetaminophen  325MG/7.5MG  04/09/1932 120 30 George Richmond [facility] #[facility]-  0519  [place] 30 1  07/16/2020 Hydrocodone/Acetaminophen  325MG/7.5MG  04/09/1932 120 30 George Richmond [facility]-  0519  [facility] 30 1     Reason for call:     Patient's wife called and said Norco 7.5 mg is no longer working for her .  Patient request Norco 10 mg.

## 2020-08-04 NOTE — TELEPHONE ENCOUNTER
I have talked to Miguel Angel and his wife at length regarding his severe arthritis.  The Norco 10/325 is an appropriate medication for his chronic pain.  He is not a good candidate for ongoing steroid injections in his lumbar spine due to chronic Eliquis.  His chronic arthritic pain is best managed with oral medications.  I told him that I would talk with Dr. Richmond and if appropriate mail him a prescription.  I will also send a note to his primary care regarding our approval of this medication.  She has been reluctant to give him this medication in the past.    Deanna Mack PA-C

## 2020-08-19 ENCOUNTER — TELEPHONE (OUTPATIENT)
Dept: NEUROSURGERY | Facility: CLINIC | Age: 85
End: 2020-08-19

## 2020-09-01 DIAGNOSIS — R10.11 RIGHT UPPER QUADRANT ABDOMINAL PAIN: ICD-10-CM

## 2020-09-01 DIAGNOSIS — Z98.890 POST-OPERATIVE STATE: ICD-10-CM

## 2020-09-01 RX ORDER — HYDROCODONE BITARTRATE AND ACETAMINOPHEN 7.5; 325 MG/1; MG/1
1 TABLET ORAL EVERY 6 HOURS PRN
Qty: 120 TABLET | Refills: 0 | Status: CANCELLED | OUTPATIENT
Start: 2020-09-01

## 2020-09-01 NOTE — TELEPHONE ENCOUNTER
Provider:  Daniel  Caller: wife  Time of call:   10:52  Phone #:  [phone]  Surgery:  Lumbar laminectomy  Surgery Date:  10/14/16  Last visit:   02/11/20  Next visit: none    TEGAN:     05/16/2020 Hydrocodone/Acetaminophen  325MG/7.5MG  04/09/1932 120 30 [facility]  [facility]  [place] 30 1  06/16/2020 Hydrocodone/Acetaminophen  325MG/7.5MG  04/09/1932 120 30 SANCHEZ REJI [facility]-  0519  [facility] 30 1  07/16/2020 Hydrocodone/Acetaminophen  325MG/7.5MG  04/09/1932 120 30 DANIEL MENDOZA [facility] #[facility]-  0519  [facility] 30 1  08/16/2020 Hydrocodone/Acetaminophen  325MG/10MG  04/09/1932 150 25 SANCHEZ REJI [facility]-  0519  [place] 60 1    Reason for call:    Pt requests refill on pain meds.

## 2020-09-02 NOTE — TELEPHONE ENCOUNTER
Patients PCP, Yumiko Mata called to check the status on this. She states that the patient is calling their office as well as ours regarding this.

## 2020-09-04 NOTE — TELEPHONE ENCOUNTER
Miguel Angel Crane's wife has called again in regards to the pain medication that Deanna will speak with Dr. Richmond about.

## 2020-09-08 ENCOUNTER — TELEPHONE (OUTPATIENT)
Dept: NEUROSURGERY | Facility: CLINIC | Age: 85
End: 2020-09-08

## 2020-09-08 NOTE — TELEPHONE ENCOUNTER
Patients wife returned a call this morning @ 8:47am, requesting a refill on the patients pain medication - 10325.  Do we know if this Rx has been mailed to the patient yet?

## 2020-09-10 ENCOUNTER — TELEPHONE (OUTPATIENT)
Dept: NEUROSURGERY | Facility: CLINIC | Age: 85
End: 2020-09-10

## 2020-09-10 NOTE — TELEPHONE ENCOUNTER
[facility] pharmacy in [facility] called in regards to Miguel Angel Crane's Norco 10 prescription.  There was no current date on the prescription, only the patient's birthdate.  Therefore, I have made them aware that it was 9/8/2020 per the prior telephone encounters.  Pharmacy was made aware and appreciative of the call.

## 2021-08-12 NOTE — PROGRESS NOTES
OFFICE VISIT  NOTE  [facility] CARDIOLOGY      Name: Miguel Angel Crane    Date: 2021  MRN:  [id number]  :  1932      REFERRING/PRIMARY PROVIDER:  Kevin Lombardi APRN    Chief Complaint   Patient presents with   • Carotid stenosis, symptomatic, with infarction (CMS/HCC)       HPI: Miguel Angel Crane is a 89 y.o. male who presents today for follow-up for paroxysmal atrial fibrillation.  Patient was admitted 2020 abdominal pain, found to have acute cholecystitis, underwent laparoscopic cholecystectomy, and ERCP for choledocholithiasis.  His associate history of CKD stage III, hypertension, hypothyroidism, kidney stones, previous history of left MCA stroke status post left carotid stent by Dr. Isaak Kellogg .  Patient has done well since discharge.  He was started on Eliquis 5 mg twice daily after discharge, no bleeding complications.    Talked with the patient by phone today, he is doing well, no cardiac complaints.  They have questions about the dosing of Eliquis.    Past Medical History:   Diagnosis Date   • Arthritis    • BPH (benign prostatic hyperplasia)    • Cataracts, both eyes    • Chronic constipation    • CKD (chronic kidney disease), stage II    • Coronary artery disease    • Hypertension    • Hypothyroidism    • Kidney stones    • Low back pain    • Mass of pancreas    • Myocardial infarct (CMS/HCC)    • PAF (paroxysmal atrial fibrillation) (CMS/HCC) 2020    New diagnosis 2020   • Stroke (CMS/HCC)     left MCA, secondary to left carotid stenosis       Past Surgical History:   Procedure Laterality Date   • CAROTID STENT Left 2016    sx with prior CVA   • CATARACT EXTRACTION W/ INTRAOCULAR LENS  IMPLANT, BILATERAL Bilateral    • CHOLECYSTECTOMY N/A 2020    Procedure: CHOLECYSTECTOMY LAPAROSCOPIC WITH IOC ;  Surgeon: Arnaldo Gan MD;  Location: [facility];  Service: General   • CYSTOSCOPY URETEROSCOPY Right 2019    Procedure: CYSTOSCOPY  Dr. Pleitez RIGHT URETERSCOPY RIGHT URETERAL STENT PLACEMENT;  Surgeon: Britton Saba MD;  Location:  [facility];  Service: Urology   • ERCP N/A 2020    Procedure: ENDOSCOPIC RETROGRADE CHOLANGIOPANCREATOGRAPHY WITH CANNULATION, BALLOON SWEEP, AND STONE REMOVAL;  Surgeon: Fredy Schaffer MD;  Location:  [facility];  Service: Gastroenterology   • EXTRACORPOREAL SHOCKWAVE LITHOTRIPSY (ESWL), STENT INSERTION/REMOVAL Right 11/15/2019    Procedure: EXTRACORPOREAL SHOCKWAVE LITHOTRIPSY WITH  STENT REMOVAL;  Surgeon: Britton Saba MD;  Location:  [facility];  Service: Urology   • LUMBAR LAMINECTOMY DISCECTOMY DECOMPRESSION N/A 10/14/2016    Procedure: LUMBAR LAMINECTOMY  L3-L4;  Surgeon: George Richmond MD;  Location:  [facility];  Service:    • TONSILLECTOMY         Social History     Socioeconomic History   • Marital status:      Spouse name: Not on file   • Number of children: Not on file   • Years of education: Not on file   • Highest education level: Not on file   Tobacco Use   • Smoking status: Former Smoker     Packs/day: 0.50     Years: 42.00     Pack years: 21.00     Types: Cigarettes     Quit date:      Years since quittin.6   • Smokeless tobacco: Never Used   • Tobacco comment: quit 24 years ago   Substance and Sexual Activity   • Alcohol use: No     Comment: 1988   • Drug use: No   • Sexual activity: Defer       Family History   Problem Relation Age of Onset   • Heart disease Mother    • Cancer Mother    • Heart disease Father    • Cancer Father         ROS:   Constitutional no fever,  no weight loss   Skin no rash, no subcutaneous nodules   Otolaryngeal no difficulty swallowing   Cardiovascular See HPI   Pulmonary no cough, no sputum production   Gastrointestinal no constipation, no diarrhea   Genitourinary no dysuria, no hematuria   Hematologic no easy bruisability, no abnormal bleeding   Musculoskeletal no muscle pain   Neurologic no dizziness, no falls  "        Allergies   Allergen Reactions   • Oxycodone GI Intolerance     constipation         Current Outpatient Medications:   •  fluticasone (FLONASE) 50 MCG/ACT nasal spray, USE 2 SPRAY(S) IN EACH NOSTRIL ONCE DAILY AT BEDTIME, Disp: , Rfl:   •  HYDROcodone-acetaminophen (NORCO) 7.5-325 MG per tablet, Take 1 tablet by mouth Every 6 (Six) Hours As Needed for Moderate Pain ., Disp: 120 tablet, Rfl: 0  •  HYDROcodone-acetaminophen (NORCO) 7.5-325 MG per tablet, Take 1 tablet by mouth Every 6 (Six) Hours As Needed for Moderate Pain ., Disp: 120 tablet, Rfl: 0  •  levothyroxine (SYNTHROID, LEVOTHROID) 50 MCG tablet, Take 50 mcg by mouth daily., Disp: , Rfl:   •  magnesium hydroxide (MILK OF MAGNESIA) 2400 MG/10ML suspension suspension, Take 10 mL by mouth Daily As Needed., Disp: , Rfl:   •  metoprolol tartrate (LOPRESSOR) 25 MG tablet, Take 0.5 tablets by mouth 2 (Two) Times a Day. PLEASE SCHEDULE AN APPT FOR FURTHER REFILLS, Disp: 90 tablet, Rfl: 0  •  Multiple Vitamins-Minerals (EYE VITAMINS PO), Take 1 tablet by mouth 2 (two) times a day., Disp: , Rfl:   •  tamsulosin (FLOMAX) 0.4 MG capsule 24 hr capsule, Take 1 capsule by mouth 2 (two) times a day., Disp: , Rfl:   •  triamcinolone (KENALOG) 0.025 % cream, APPLY CREAM TOPICALLY EVERY OTHER DAY TO RASH AREAS ON BODY ONLY IF NEEDED, Disp: , Rfl:   •  apixaban (ELIQUIS) 2.5 MG tablet tablet, Take 1 tablet by mouth Every 12 (Twelve) Hours., Disp: 180 tablet, Rfl: 3    Vitals:    08/13/21 0933   BP: 135/80   BP Location: Left arm   Patient Position: Sitting   Weight: 109 kg (240 lb)   Height: 180.3 cm (71\")     Body mass index is 33.47 kg/m².    PHYSICAL EXAM:    · Telephone visit, no audible distress or dyspnea    RESULTS:   Procedures    Results for orders placed during the hospital encounter of 01/07/20    Adult Transthoracic Echo Complete W/ Cont if Necessary Per Protocol    Interpretation Summary  · Left ventricular systolic function is normal. Calculated EF = " 62.0%. Estimated EF appears to be in the range of 56 - 60%  · Left ventricular wall thickness is consistent with mild to moderate septal asymmetric hypertrophy.  · Left ventricular diastolic dysfunction is noted (grade I) consistent with impaired relaxation.  · Mild MAC is present. Trace mitral valve regurgitation is present.        Labs:  Lab Results   Component Value Date    AST 17 01/28/2020    ALT 13 01/28/2020     Lab Results   Component Value Date    HGBA1C 6.00 10/13/2016     No components found for: CREATINININE  eGFR Non  Amer   Date Value Ref Range Status   01/28/2020 44 (L) >60 mL/min/1.73 Final   01/11/2020 46 (L) >60 mL/min/1.73 Final   01/10/2020 47 (L) >60 mL/min/1.73 Final         ASSESSMENT:  Problem List Items Addressed This Visit        Cardiac and Vasculature    Hypertension    PAF (paroxysmal atrial fibrillation) (CMS/[facility]) - Primary    Overview     01/08/20 Echo  · LVEF = 62.0%.  · Left ventricular diastolic dysfunction is noted (grade I) consistent with impaired relaxation.            Genitourinary and Reproductive     CKD (chronic kidney disease), stage II    Overview     Baseline creatinine ~1.2 - 1.3            Neuro    Carotid stenosis, symptomatic, with infarction (CMS/[facility])    Overview     09/14/16 Carotid ultrasound  · No evidence of hemodynamically significant stenosis of bilateral carotid arteries.  · Mild bilateral scattered plaque is noted with patent left carotid stent.  · Elevated right ICA systolic velocities may be due to vessel tortuosity, however moderate stenosis cannot be excluded.               PLAN:    1.  Paroxysmal atrial fibrillation:  Spontaneous converted during hospitalization 1/2020 for acute cholecystitis    Due to rising creatinine above 1.5, he now qualifies for lower dose of Eliquis at 2.5 mg twice daily, new prescription will be sent.    Discussed with patient's wife, she understands  Continue metoprolol 12.5 mg twice daily    Echocardiogram dated 1/8/2020  reviewed, EF 60%, grade 1 diastolic dysfunction, moderate LVH    2.  Carotid stenosis, left carotid stenosis status post stent placement in 2012  Last carotid duplex 9/2016 showed no evidence of hemodynamically significant stenosis of bilateral carotids bilateral scattered plaque noted.    3.  Chronic diastolic heart failure, grade 1:  Continue metoprolol 12.5 mg twice daily  Increase exercise, decrease sodium intake    4.  Chronic low back pain:  Continue follow-up with PCP and neurosurgery.    This patient has consented to a telehealth visit via telephone. The visit was scheduled as a telephone visit to comply with patient safety concerns in accordance with CDC recommendations.  All vitals recorded within this visit are reported by the patient.  I spent 21 minutes in total including but not limited to the 10 minutes spent in direct conversation with this patient.       Return to clinic in 12 months via telephone    Thank you for the opportunity to share in the care of your patient; please do not hesitate to call me with any questions.     Zack Kruger MD, [facility]  Office: [phone] [address] [place] [address]

## 2021-08-13 ENCOUNTER — TELEMEDICINE (OUTPATIENT)
Dept: CARDIOLOGY | Facility: CLINIC | Age: 86
End: 2021-08-13

## 2021-08-13 VITALS
BODY MASS INDEX: 33.6 KG/M2 | DIASTOLIC BLOOD PRESSURE: 80 MMHG | WEIGHT: 240 LBS | HEIGHT: 71 IN | SYSTOLIC BLOOD PRESSURE: 135 MMHG

## 2021-08-13 DIAGNOSIS — I63.239 CAROTID STENOSIS, SYMPTOMATIC, WITH INFARCTION (HCC): ICD-10-CM

## 2021-08-13 DIAGNOSIS — I48.0 PAF (PAROXYSMAL ATRIAL FIBRILLATION) (HCC): Primary | ICD-10-CM

## 2021-08-13 DIAGNOSIS — I10 ESSENTIAL HYPERTENSION: ICD-10-CM

## 2021-08-13 DIAGNOSIS — N18.2 CKD (CHRONIC KIDNEY DISEASE), STAGE II: ICD-10-CM

## 2021-08-13 PROCEDURE — 99443 PR PHYS/QHP TELEPHONE EVALUATION 21-30 MIN: CPT | Performed by: INTERNAL MEDICINE

## 2022-08-10 NOTE — PROGRESS NOTES
OFFICE VISIT  NOTE  [facility] CARDIOLOGY      Name: Miguel Angel Crane    Date: 2022  MRN:  [id number]  :  1932      REFERRING/PRIMARY PROVIDER:  Kevin Lombardi APRN     Chief Complaint   Patient presents with   • Carotid stenosis, symptomatic, with infarction (CMS/HCC)       HPI: Miguel Angel Crane is a 89 y.o. male who presents today for follow-up for paroxysmal atrial fibrillation.  Patient was admitted 2020 abdominal pain, found to have acute cholecystitis, underwent laparoscopic cholecystectomy, and ERCP for choledocholithiasis.  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 Isaak Minaya, .  Patient has done well since discharge.  He was started on Eliquis 5 mg twice daily after discharge, no bleeding complications.    Talked with the patient by phone today, he is doing well, recovering from appendectomy 1 month ago.  Planning for another ERCP and pancreas biopsy later this month.    ROS:Pertinent positives as listed in the HPI.  All other systems reviewed and negative.    Past Medical History:   Diagnosis Date   • Arthritis    • BPH (benign prostatic hyperplasia)    • Cataracts, both eyes    • Chronic constipation    • CKD (chronic kidney disease), stage II    • Coronary artery disease    • Hypertension    • Hypothyroidism    • Kidney stones    • Low back pain    • Mass of pancreas    • Myocardial infarct (HCC)    • PAF (paroxysmal atrial fibrillation) (HCC) 2020    New diagnosis 2020   • Stroke (HCC)     left MCA, secondary to left carotid stenosis       Past Surgical History:   Procedure Laterality Date   • APPENDECTOMY  2022   • CAROTID STENT Left 2016    sx with prior CVA   • CATARACT EXTRACTION W/ INTRAOCULAR LENS  IMPLANT, BILATERAL Bilateral    • CHOLECYSTECTOMY N/A 2020    Procedure: CHOLECYSTECTOMY LAPAROSCOPIC WITH IOC ;   Surgeon: Arnaldo Gan MD;  Location:  [facility];  Service: General   • CYSTOSCOPY URETEROSCOPY Right 2019    Procedure: CYSTOSCOPY RIGHT URETERSCOPY RIGHT URETERAL STENT PLACEMENT;  Surgeon: Britton Saba MD;  Location:  [facility];  Service: Urology   • ERCP N/A 2020    Procedure: ENDOSCOPIC RETROGRADE CHOLANGIOPANCREATOGRAPHY WITH CANNULATION, BALLOON SWEEP, AND STONE REMOVAL;  Surgeon: Fredy Schaffer MD;  Location:  [facility];  Service: Gastroenterology   • EXTRACORPOREAL SHOCKWAVE LITHOTRIPSY (ESWL), STENT INSERTION/REMOVAL Right 11/15/2019    Procedure: EXTRACORPOREAL SHOCKWAVE LITHOTRIPSY WITH  STENT REMOVAL;  Surgeon: Britton Saba MD;  Location:  [facility];  Service: Urology   • LUMBAR LAMINECTOMY DISCECTOMY DECOMPRESSION N/A 10/14/2016    Procedure: LUMBAR LAMINECTOMY  L3-L4;  Surgeon: George Richmond MD;  Location:  [facility];  Service:    • TONSILLECTOMY         Social History     Socioeconomic History   • Marital status:    Tobacco Use   • Smoking status: Former Smoker     Packs/day: 0.50     Years: 42.00     Pack years: 21.00     Types: Cigarettes     Quit date:      Years since quittin.6   • Smokeless tobacco: Never Used   • Tobacco comment: quit 24 years ago   Substance and Sexual Activity   • Alcohol use: No     Comment: 1988   • Drug use: No   • Sexual activity: Defer       Family History   Problem Relation Age of Onset   • Heart disease Mother    • Cancer Mother    • Heart disease Father    • Cancer Father         Allergies   Allergen Reactions   • Oxycodone GI Intolerance     constipation       Current Outpatient Medications   Medication Instructions   • apixaban (ELIQUIS) 2.5 mg, Oral, Every 12 Hours Scheduled   • docusate calcium (SURFAK) 240 mg, Oral, Daily   • finasteride (PROSCAR) 5 mg, Oral, Daily   • fluticasone (FLONASE) 50 MCG/ACT nasal spray USE 2 SPRAY(S) IN EACH NOSTRIL ONCE DAILY AT BEDTIME   • gabapentin  "(NEURONTIN) 100 mg, Oral   • HYDROcodone-acetaminophen (NORCO) 7.5-325 MG per tablet 1 tablet, Oral, Every 6 Hours PRN   • HYDROcodone-acetaminophen (NORCO) 7.5-325 MG per tablet 1 tablet, Oral, Every 6 Hours PRN   • levothyroxine (SYNTHROID, LEVOTHROID) 50 mcg, Oral, Daily   • magnesium hydroxide (MILK OF MAGNESIA) 2400 MG/10ML suspension suspension 10 mL, Oral, Daily PRN   • metoprolol tartrate (LOPRESSOR) 12.5 mg, Oral, 2 Times Daily   • Multiple Vitamins-Minerals (EYE VITAMINS PO) 1 tablet, Oral, 2 times daily   • tamsulosin (FLOMAX) 0.4 MG capsule 24 hr capsule 1 capsule, Oral, 2 times daily   • triamcinolone (KENALOG) 0.025 % cream APPLY CREAM TOPICALLY EVERY OTHER DAY TO RASH AREAS ON BODY ONLY IF NEEDED       Vitals:    08/11/22 1514   Weight: 90.7 kg (200 lb)   Height: 180.3 cm (71\")     Body mass index is 27.89 kg/m².    PHYSICAL EXAM:  Telephone visit.    RESULTS:   Procedures    Results for orders placed during the hospital encounter of 01/07/20    Adult Transthoracic Echo Complete W/ Cont if Necessary Per Protocol    Interpretation Summary  · Left ventricular systolic function is normal. Calculated EF = 62.0%. Estimated EF appears to be in the range of 56 - 60%  · Left ventricular wall thickness is consistent with mild to moderate septal asymmetric hypertrophy.  · Left ventricular diastolic dysfunction is noted (grade I) consistent with impaired relaxation.  · Mild MAC is present. Trace mitral valve regurgitation is present.        Labs:  Lab Results   Component Value Date    AST 17 01/28/2020    ALT 13 01/28/2020     Lab Results   Component Value Date    HGBA1C 6.00 10/13/2016     Creatinine   Date Value Ref Range Status   01/28/2020 1.50 (H) 0.76 - 1.27 mg/dL Final   01/11/2020 1.46 (H) 0.76 - 1.27 mg/dL Final   01/10/2020 1.43 (H) 0.76 - 1.27 mg/dL Final     eGFR Non  Amer   Date Value Ref Range Status   01/28/2020 44 (L) >60 mL/min/1.73 Final   01/11/2020 46 (L) >60 mL/min/1.73 Final "   01/10/2020 47 (L) >60 mL/min/1.73 Final         ASSESSMENT:  Problem List Items Addressed This Visit        Cardiac and Vasculature    Hypertension    PAF (paroxysmal atrial fibrillation) ([facility]) - Primary    Overview     01/08/20 Echo  · LVEF = 62.0%.  · Left ventricular diastolic dysfunction is noted (grade I) consistent with impaired relaxation.            Genitourinary and Reproductive     CKD (chronic kidney disease), stage II    Overview     Baseline creatinine ~1.2 - 1.3            Neuro    Carotid stenosis, symptomatic, with infarction ([facility])    Overview     09/14/16 Carotid ultrasound  · No evidence of hemodynamically significant stenosis of bilateral carotid arteries.  · Mild bilateral scattered plaque is noted with patent left carotid stent.  · Elevated right ICA systolic velocities may be due to vessel tortuosity, however moderate stenosis cannot be excluded.               PLAN:    1.  Paroxysmal atrial fibrillation:  Spontaneously converted during hospitalization 1/2020 for acute cholecystitis  Continue Eliquis 2.5mg BID  Continue metoprolol 12.5 mg twice daily    Okay to hold Eliquis 48 hours prior to procedures if needed.     Echocardiogram dated 1/8/2020 reviewed, EF 60%, grade 1 diastolic dysfunction, moderate LVH     2.  Carotid stenosis, left carotid stenosis status post stent placement in 2012  Last carotid duplex 9/2016 showed no evidence of hemodynamically significant stenosis of bilateral carotids, bilateral scattered plaque noted.     3.  Chronic diastolic heart failure, grade 1:  Continue metoprolol 12.5 mg twice daily  Increase exercise, decrease sodium intake     4.  Chronic low back pain:  Continue follow-up with PCP and neurosurgery.     This patient has consented to a telehealth visit via telephone. The visit was scheduled as a telephone visit to comply with patient safety concerns in accordance with CDC recommendations.  All vitals recorded within this visit are reported by the patient.   I spent 16 minutes in total including but not limited to the 16 minutes spent in direct conversation with this patient.         Follow-up   Return in about 9 months (around 5/11/2023) for telephone.    Zack Kruger MD, [facility]  Interventional Cardiology

## 2022-08-11 ENCOUNTER — OFFICE VISIT (OUTPATIENT)
Dept: CARDIOLOGY | Facility: CLINIC | Age: 87
End: 2022-08-11

## 2022-08-11 VITALS — BODY MASS INDEX: 28 KG/M2 | HEIGHT: 71 IN | WEIGHT: 200 LBS

## 2022-08-11 DIAGNOSIS — N18.2 CKD (CHRONIC KIDNEY DISEASE), STAGE II: ICD-10-CM

## 2022-08-11 DIAGNOSIS — I10 PRIMARY HYPERTENSION: ICD-10-CM

## 2022-08-11 DIAGNOSIS — I48.0 PAF (PAROXYSMAL ATRIAL FIBRILLATION): Primary | ICD-10-CM

## 2022-08-11 DIAGNOSIS — I63.239 CAROTID STENOSIS, SYMPTOMATIC, WITH INFARCTION: ICD-10-CM

## 2022-08-11 PROCEDURE — 99442 PR PHYS/QHP TELEPHONE EVALUATION 11-20 MIN: CPT | Performed by: INTERNAL MEDICINE

## 2022-08-11 RX ORDER — FINASTERIDE 5 MG/1
5 TABLET, FILM COATED ORAL DAILY
COMMUNITY
Start: 2022-07-21

## 2022-08-11 RX ORDER — APIXABAN 2.5 MG/1
TABLET, FILM COATED ORAL
Qty: 180 TABLET | Refills: 3 | Status: SHIPPED | OUTPATIENT
Start: 2022-08-11

## 2022-08-11 RX ORDER — DOCUSATE CALCIUM 240 MG
240 CAPSULE ORAL DAILY
COMMUNITY
Start: 2022-06-23

## 2022-08-11 RX ORDER — GABAPENTIN 100 MG/1
100 CAPSULE ORAL
COMMUNITY
Start: 2022-08-10

## 2023-05-09 PROBLEM — N18.30 CKD (CHRONIC KIDNEY DISEASE), STAGE III: Status: ACTIVE | Noted: 2020-01-07

## 2023-05-09 NOTE — PROGRESS NOTES
OFFICE VISIT  NOTE  [facility] CARDIOLOGY MAIN CAMPUS      Name: Miguel Angel Crane    Date: 2023  MRN:  [id number]  :  1932      REFERRING/PRIMARY PROVIDER:  Arnaldo Wilson MD     Chief Complaint   Patient presents with   • Follow-up     PAF (paroxysmal atrial fibrillation     HPI: Miguel Angel Crane is a 89 y.o. male who is contacted via phone today for follow-up of paroxysmal atrial fibrillation.  Patient was admitted 2020 abdominal pain, found to have acute cholecystitis, underwent laparoscopic cholecystectomy, and ERCP for choledocholithiasis.  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 Isaak Minaya, .  He was started on Eliquis after discharge, no bleeding complications. He is mostly homebound and walks with a walker, denies any falls. Denies any cardiac symptoms, no chest pain, palpitations, or heart failure symptoms. He saw his PCP about a month ago, and had labs which are unavailable for review at this time.         ROS:Pertinent positives as listed in the HPI.  All other systems reviewed and negative.    Past Medical History:   Diagnosis Date   • Arthritis    • BPH (benign prostatic hyperplasia)    • Cataracts, both eyes    • Chronic constipation    • CKD (chronic kidney disease), stage II    • Coronary artery disease    • Hypertension    • Hypothyroidism    • Kidney stones    • Low back pain    • Mass of pancreas    • Myocardial infarct    • PAF (paroxysmal atrial fibrillation) 2020    New diagnosis 2020   • Stroke     left MCA, secondary to left carotid stenosis       Past Surgical History:   Procedure Laterality Date   • APPENDECTOMY  2022   • CAROTID STENT Left 2016    sx with prior CVA   • CATARACT EXTRACTION W/ INTRAOCULAR LENS  IMPLANT, BILATERAL Bilateral    • CHOLECYSTECTOMY N/A 2020    Procedure: CHOLECYSTECTOMY  LAPAROSCOPIC WITH IOC ;  Surgeon: Arnaldo Gan MD;  Location:  [facility];  Service: General   • CYSTOSCOPY URETEROSCOPY Right 2019    Procedure: CYSTOSCOPY RIGHT URETERSCOPY RIGHT URETERAL STENT PLACEMENT;  Surgeon: Britton Saba MD;  Location:  [facility];  Service: Urology   • ERCP N/A 2020    Procedure: ENDOSCOPIC RETROGRADE CHOLANGIOPANCREATOGRAPHY WITH CANNULATION, BALLOON SWEEP, AND STONE REMOVAL;  Surgeon: Fredy Schaffer MD;  Location:  [facility];  Service: Gastroenterology   • EXTRACORPOREAL SHOCKWAVE LITHOTRIPSY (ESWL), STENT INSERTION/REMOVAL Right 11/15/2019    Procedure: EXTRACORPOREAL SHOCKWAVE LITHOTRIPSY WITH  STENT REMOVAL;  Surgeon: Britton Saba MD;  Location:  [facility];  Service: Urology   • LUMBAR LAMINECTOMY DISCECTOMY DECOMPRESSION N/A 10/14/2016    Procedure: LUMBAR LAMINECTOMY  L3-L4;  Surgeon: George Richmond MD;  Location:  [facility];  Service:    • TONSILLECTOMY         Social History     Socioeconomic History   • Marital status:    Tobacco Use   • Smoking status: Former     Packs/day: 0.50     Years: 42.00     Pack years: 21.00     Types: Cigarettes     Quit date:      Years since quittin.3     Passive exposure: Past   • Smokeless tobacco: Never   • Tobacco comments:     quit 24 years ago   Vaping Use   • Vaping Use: Never used   Substance and Sexual Activity   • Alcohol use: Not Currently     Comment: 1988   • Drug use: Never   • Sexual activity: Defer       Family History   Problem Relation Age of Onset   • Heart disease Mother    • Cancer Mother    • Heart disease Father    • Cancer Father         Allergies   Allergen Reactions   • Oxycodone GI Intolerance     constipation       Current Outpatient Medications   Medication Instructions   • docusate calcium (SURFAK) 240 mg, Oral, Daily   • Eliquis 2.5 MG tablet tablet TAKE 1 TABLET BY MOUTH EVERY 12 HOURS   • finasteride (PROSCAR) 5 mg, Oral, Daily   • fluticasone  "(FLONASE) 50 MCG/ACT nasal spray USE 2 SPRAY(S) IN EACH NOSTRIL ONCE DAILY AT BEDTIME   • gabapentin (NEURONTIN) 100 mg, Oral, 2 Times Daily   • HYDROcodone-acetaminophen (NORCO) 7.5-325 MG per tablet 1 tablet, Oral, Every 6 Hours PRN   • levothyroxine (SYNTHROID, LEVOTHROID) 50 mcg, Oral, Daily   • magnesium hydroxide (MILK OF MAGNESIA) 2400 MG/10ML suspension suspension 10 mL, Oral, Daily PRN   • metoprolol tartrate (LOPRESSOR) 12.5 mg, Oral, Every 12 Hours Scheduled   • Movantik 25 MG tablet 1 tablet, Oral, Daily   • Multiple Vitamins-Minerals (EYE VITAMINS PO) 1 tablet, Oral, 2 times daily   • tamsulosin (FLOMAX) 0.4 MG capsule 24 hr capsule 1 capsule, Oral, 2 times daily   • triamcinolone (KENALOG) 0.025 % cream APPLY CREAM TOPICALLY EVERY OTHER DAY TO RASH AREAS ON BODY ONLY IF NEEDED        Vitals:    05/11/23 1118   Weight: 113 kg (250 lb)   Height: 180.3 cm (71\")     Body mass index is 34.87 kg/m².    PHYSICAL EXAM:    General Appearance:   · In no acute distress    RESULTS:   Procedures    Results for orders placed during the hospital encounter of 01/07/20    Adult Transthoracic Echo Complete W/ Cont if Necessary Per Protocol    Interpretation Summary  · Left ventricular systolic function is normal. Calculated EF = 62.0%. Estimated EF appears to be in the range of 56 - 60%  · Left ventricular wall thickness is consistent with mild to moderate septal asymmetric hypertrophy.  · Left ventricular diastolic dysfunction is noted (grade I) consistent with impaired relaxation.  · Mild MAC is present. Trace mitral valve regurgitation is present.    Labs:  Lab Results   Component Value Date    AST 17 01/28/2020    ALT 13 01/28/2020     Lab Results   Component Value Date    HGBA1C 6.00 10/13/2016     Creatinine   Date Value Ref Range Status   01/28/2020 1.50 (H) 0.76 - 1.27 mg/dL Final   01/11/2020 1.46 (H) 0.76 - 1.27 mg/dL Final   01/10/2020 1.43 (H) 0.76 - 1.27 mg/dL Final     eGFR Non  Amer   Date Value " Ref Range Status   01/28/2020 44 (L) >60 mL/min/1.73 Final   01/11/2020 46 (L) >60 mL/min/1.73 Final   01/10/2020 47 (L) >60 mL/min/1.73 Final       ASSESSMENT:  Problem List Items Addressed This Visit        Cardiac and Vasculature    Hypertension    PAF (paroxysmal atrial fibrillation) - Primary    Overview     01/08/20 Echo  · LVEF = 62.0%.  · Left ventricular diastolic dysfunction is noted (grade I) consistent with impaired relaxation.            Genitourinary and Reproductive     CKD (chronic kidney disease), stage III       Neuro    Carotid stenosis, symptomatic, with infarction    Overview     09/14/16 Carotid ultrasound  · No evidence of hemodynamically significant stenosis of bilateral carotid arteries.  · Mild bilateral scattered plaque is noted with patent left carotid stent.  · Elevated right ICA systolic velocities may be due to vessel tortuosity, however moderate stenosis cannot be excluded.            PLAN:  1.  Paroxysmal atrial fibrillation:  Spontaneously converted during hospitalization 1/2020 for acute cholecystitis  Continue Eliquis 2.5mg BID  Continue metoprolol 12.5 mg twice daily  He denies any recurrent palpitations, and no bleeding issues or falls   Will try to obtain most recent lab results from PCP office      Okay to hold Eliquis 48 hours prior to procedures if needed.     Echocardiogram dated 1/8/2020 reviewed, EF 60%, grade 1 diastolic dysfunction, moderate LVH     2.  Carotid stenosis, left carotid stenosis status post stent placement in 2012  Last carotid duplex 9/2016 showed no evidence of hemodynamically significant stenosis of bilateral carotids, bilateral scattered plaque noted.     3.  Chronic diastolic heart failure, grade 1:  Continue metoprolol 12.5 mg twice daily  Increase exercise, decrease sodium intake  He denies any heart failure symptoms currently      4.  Chronic low back pain:  Continue follow-up with PCP and neurosurgery.         Follow-up   Return in about 6 months  (around 11/11/2023) for Phone visit with RDS .      This patient has consented to a telehealth visit via phone. The visit was scheduled as a phone visit to comply with patient safety concerns in accordance with CDC recommendations.  All vitals recorded within this visit are reported by the patient.  I spent 10 minutes in total including but not limited to the 5 minutes spent in direct conversation with this patient.       Electronically signed by Lolis Orona PA-C, 05/11/23, 11:37 AM EDT.

## 2023-05-11 ENCOUNTER — OFFICE VISIT (OUTPATIENT)
Dept: CARDIOLOGY | Facility: CLINIC | Age: 88
End: 2023-05-11
Payer: MEDICARE

## 2023-05-11 VITALS — BODY MASS INDEX: 35 KG/M2 | HEIGHT: 71 IN | WEIGHT: 250 LBS

## 2023-05-11 DIAGNOSIS — I63.239 CAROTID STENOSIS, SYMPTOMATIC, WITH INFARCTION: ICD-10-CM

## 2023-05-11 DIAGNOSIS — N18.30 STAGE 3 CHRONIC KIDNEY DISEASE, UNSPECIFIED WHETHER STAGE 3A OR 3B CKD: ICD-10-CM

## 2023-05-11 DIAGNOSIS — I10 PRIMARY HYPERTENSION: ICD-10-CM

## 2023-05-11 DIAGNOSIS — I48.0 PAF (PAROXYSMAL ATRIAL FIBRILLATION): Primary | ICD-10-CM

## 2023-05-11 RX ORDER — NALOXEGOL OXALATE 25 MG/1
1 TABLET, FILM COATED ORAL DAILY
COMMUNITY
Start: 2023-04-19

## 2023-10-16 RX ORDER — APIXABAN 2.5 MG/1
TABLET, FILM COATED ORAL
Qty: 180 TABLET | Refills: 3 | Status: SHIPPED | OUTPATIENT
Start: 2023-10-16

## 2023-10-23 ENCOUNTER — APPOINTMENT (OUTPATIENT)
Dept: CT IMAGING | Facility: HOSPITAL | Age: 88
End: 2023-10-23
Payer: MEDICARE

## 2023-10-23 PROCEDURE — 74176 CT ABD & PELVIS W/O CONTRAST: CPT

## 2023-10-23 PROCEDURE — 99284 EMERGENCY DEPT VISIT MOD MDM: CPT

## 2023-10-23 RX ORDER — SODIUM CHLORIDE 0.9 % (FLUSH) 0.9 %
10 SYRINGE (ML) INJECTION AS NEEDED
Status: DISCONTINUED | OUTPATIENT
Start: 2023-10-23 | End: 2023-10-24 | Stop reason: HOSPADM

## 2023-10-24 ENCOUNTER — HOSPITAL ENCOUNTER (EMERGENCY)
Facility: HOSPITAL | Age: 88
Discharge: HOME | End: 2023-10-24
Attending: EMERGENCY MEDICINE | Admitting: EMERGENCY MEDICINE
Payer: MEDICARE

## 2023-10-24 VITALS
OXYGEN SATURATION: 97 % | HEIGHT: 71 IN | WEIGHT: 260 LBS | TEMPERATURE: 97.9 F | BODY MASS INDEX: 36.4 KG/M2 | RESPIRATION RATE: 18 BRPM | HEART RATE: 61 BPM | DIASTOLIC BLOOD PRESSURE: 88 MMHG | SYSTOLIC BLOOD PRESSURE: 166 MMHG

## 2023-10-24 DIAGNOSIS — K59.09 CHRONIC CONSTIPATION: Primary | ICD-10-CM

## 2023-10-24 LAB
ALBUMIN SERPL-MCNC: 3.5 G/DL (ref 3.5–5.2)
ALBUMIN/GLOB SERPL: 1.3 G/DL
ALP SERPL-CCNC: 83 U/L (ref 39–117)
ALT SERPL W P-5'-P-CCNC: 7 U/L (ref 1–41)
ANION GAP SERPL CALCULATED.3IONS-SCNC: 10 MMOL/L (ref 5–15)
AST SERPL-CCNC: 15 U/L (ref 1–40)
BASOPHILS # BLD AUTO: 0.03 10*3/MM3 (ref 0–0.2)
BASOPHILS NFR BLD AUTO: 0.8 % (ref 0–1.5)
BILIRUB SERPL-MCNC: 0.5 MG/DL (ref 0–1.2)
BUN SERPL-MCNC: 14 MG/DL (ref 8–23)
BUN/CREAT SERPL: 10.9 (ref 7–25)
CALCIUM SPEC-SCNC: 8.6 MG/DL (ref 8.2–9.6)
CHLORIDE SERPL-SCNC: 104 MMOL/L (ref 98–107)
CO2 SERPL-SCNC: 25 MMOL/L (ref 22–29)
CREAT SERPL-MCNC: 1.29 MG/DL (ref 0.76–1.27)
DEPRECATED RDW RBC AUTO: 48.4 FL (ref 37–54)
EGFRCR SERPLBLD CKD-EPI 2021: 52.3 ML/MIN/1.73
EOSINOPHIL # BLD AUTO: 0.33 10*3/MM3 (ref 0–0.4)
EOSINOPHIL NFR BLD AUTO: 8.5 % (ref 0.3–6.2)
ERYTHROCYTE [DISTWIDTH] IN BLOOD BY AUTOMATED COUNT: 13.1 % (ref 12.3–15.4)
GLOBULIN UR ELPH-MCNC: 2.8 GM/DL
GLUCOSE SERPL-MCNC: 98 MG/DL (ref 65–99)
HCT VFR BLD AUTO: 34 % (ref 37.5–51)
HGB BLD-MCNC: 11.3 G/DL (ref 13–17.7)
IMM GRANULOCYTES # BLD AUTO: 0.01 10*3/MM3 (ref 0–0.05)
IMM GRANULOCYTES NFR BLD AUTO: 0.3 % (ref 0–0.5)
LIPASE SERPL-CCNC: 12 U/L (ref 13–60)
LYMPHOCYTES # BLD AUTO: 1.04 10*3/MM3 (ref 0.7–3.1)
LYMPHOCYTES NFR BLD AUTO: 26.9 % (ref 19.6–45.3)
MCH RBC QN AUTO: 33.8 PG (ref 26.6–33)
MCHC RBC AUTO-ENTMCNC: 33.2 G/DL (ref 31.5–35.7)
MCV RBC AUTO: 101.8 FL (ref 79–97)
MONOCYTES # BLD AUTO: 0.37 10*3/MM3 (ref 0.1–0.9)
MONOCYTES NFR BLD AUTO: 9.6 % (ref 5–12)
NEUTROPHILS NFR BLD AUTO: 2.09 10*3/MM3 (ref 1.7–7)
NEUTROPHILS NFR BLD AUTO: 53.9 % (ref 42.7–76)
NRBC BLD AUTO-RTO: 0 /100 WBC (ref 0–0.2)
PLATELET # BLD AUTO: 88 10*3/MM3 (ref 140–450)
PMV BLD AUTO: 9.5 FL (ref 6–12)
POTASSIUM SERPL-SCNC: 4 MMOL/L (ref 3.5–5.2)
PROT SERPL-MCNC: 6.3 G/DL (ref 6–8.5)
RBC # BLD AUTO: 3.34 10*6/MM3 (ref 4.14–5.8)
SODIUM SERPL-SCNC: 139 MMOL/L (ref 136–145)
WBC NRBC COR # BLD: 3.87 10*3/MM3 (ref 3.4–10.8)

## 2023-10-24 PROCEDURE — 85025 COMPLETE CBC W/AUTO DIFF WBC: CPT | Performed by: EMERGENCY MEDICINE

## 2023-10-24 PROCEDURE — 83690 ASSAY OF LIPASE: CPT | Performed by: EMERGENCY MEDICINE

## 2023-10-24 PROCEDURE — 80053 COMPREHEN METABOLIC PANEL: CPT | Performed by: EMERGENCY MEDICINE

## 2023-10-24 RX ORDER — PLECANATIDE 3 MG/1
3 TABLET ORAL DAILY
COMMUNITY

## 2023-10-24 NOTE — ED PROVIDER NOTES
Subjective   History of Present Illness  This is a 89-year-old male with a history of chronic kidney disease and chronic constipation presented to the emergency department with some constipation.  Patient is accompanied by his wife who also provide history.  He states that he has not had a good bowel movement last 3 weeks.  He has had some liquid and loose stools.  He states that he has been bearing down and spasming feeling like he is going to the bathroom, however he is unable to produce a solid bowel movement.  They tried a enema yesterday without any success.  Patient continues to pass gas without any difficulty.  Is not having any nausea or vomiting.  Denies any fevers or chills.  No headache or change in vision.  No focal weakness.  No chest pain or shortness of breath    History provided by:  Patient and spouse   used: No        Review of Systems   Constitutional:  Negative for chills and fever.   HENT:  Negative for congestion, ear pain and sore throat.    Eyes:  Negative for visual disturbance.   Respiratory:  Negative for shortness of breath.    Cardiovascular:  Negative for chest pain.   Gastrointestinal:  Positive for abdominal pain and constipation.   Genitourinary:  Negative for difficulty urinating.   Musculoskeletal:  Negative for arthralgias.   Skin:  Negative for rash.   Neurological:  Negative for dizziness, weakness and numbness.   Psychiatric/Behavioral:  Negative for agitation.        Past Medical History:   Diagnosis Date    Arthritis     BPH (benign prostatic hyperplasia)     Cataracts, both eyes     Chronic constipation     CKD (chronic kidney disease), stage II     Coronary artery disease     Hypertension     Hypothyroidism     Kidney stones     Low back pain     Mass of pancreas     Myocardial infarct 1993    PAF (paroxysmal atrial fibrillation) 1/9/2020    New diagnosis 1/7/2020    Stroke     left MCA, secondary to left carotid stenosis       Allergies   Allergen  Reactions    Oxycodone GI Intolerance     constipation       Past Surgical History:   Procedure Laterality Date    APPENDECTOMY  2022    CAROTID STENT Left 2016    sx with prior CVA    CATARACT EXTRACTION W/ INTRAOCULAR LENS  IMPLANT, BILATERAL Bilateral     CHOLECYSTECTOMY N/A 2020    Procedure: CHOLECYSTECTOMY LAPAROSCOPIC WITH IOC ;  Surgeon: rAnaldo Gan MD;  Location:  [facility];  Service: General    CYSTOSCOPY URETEROSCOPY Right 2019    Procedure: CYSTOSCOPY RIGHT URETERSCOPY RIGHT URETERAL STENT PLACEMENT;  Surgeon: Britton Saba MD;  Location:  [facility];  Service: Urology    ERCP N/A 2020    Procedure: ENDOSCOPIC RETROGRADE CHOLANGIOPANCREATOGRAPHY WITH CANNULATION, BALLOON SWEEP, AND STONE REMOVAL;  Surgeon: Fredy Schaffer MD;  Location:  [facility];  Service: Gastroenterology    EXTRACORPOREAL SHOCKWAVE LITHOTRIPSY (ESWL), STENT INSERTION/REMOVAL Right 11/15/2019    Procedure: EXTRACORPOREAL SHOCKWAVE LITHOTRIPSY WITH  STENT REMOVAL;  Surgeon: Britton Saba MD;  Location:  [facility];  Service: Urology    LUMBAR LAMINECTOMY DISCECTOMY DECOMPRESSION N/A 10/14/2016    Procedure: LUMBAR LAMINECTOMY  L3-L4;  Surgeon: George Richmond MD;  Location:  [facility];  Service:     TONSILLECTOMY         Family History   Problem Relation Age of Onset    Heart disease Mother     Cancer Mother     Heart disease Father     Cancer Father        Social History     Socioeconomic History    Marital status:    Tobacco Use    Smoking status: Former     Packs/day: 0.50     Years: 42.00     Additional pack years: 0.00     Total pack years: 21.00     Types: Cigarettes     Quit date:      Years since quittin.8     Passive exposure: Past    Smokeless tobacco: Never    Tobacco comments:     quit 24 years ago   Vaping Use    Vaping Use: Never used   Substance and Sexual Activity    Alcohol use: Not Currently     Comment: 1988    Drug use: Never     Sexual activity: Defer           Objective   Physical Exam  Vitals and nursing note reviewed.   Constitutional:       General: He is not in acute distress.     Appearance: He is not ill-appearing or toxic-appearing.   HENT:      Mouth/Throat:      Pharynx: No posterior oropharyngeal erythema.   Eyes:      Extraocular Movements: Extraocular movements intact.      Pupils: Pupils are equal, round, and reactive to light.   Cardiovascular:      Rate and Rhythm: Normal rate and regular rhythm.   Pulmonary:      Effort: Pulmonary effort is normal. No respiratory distress.   Abdominal:      General: Abdomen is flat. There is no distension.      Palpations: There is no mass.      Tenderness: There is no abdominal tenderness. There is no guarding or rebound.   Musculoskeletal:         General: No deformity. Normal range of motion.   Neurological:      General: No focal deficit present.      Mental Status: He is alert.      Sensory: No sensory deficit.      Motor: No weakness.         Procedures           ED Course  ED Course as of 10/24/23 0441   Mon Oct 23, 2023   2255 BP(!): 156/116 [JK]   2255 Temp: 97.9 °F (36.6 °C) [JK]   2255 Temp src: Oral [JK]   2255 Heart Rate: 64 [JK]   2255 Resp: 18 [JK]   2255 SpO2: 94 % [JK]   2255 Device (Oxygen Therapy): room air  Patient's repeat vitals, telemetry tracing, and pulse oximetry tracing were directly viewed and interpreted by myself.  Patient slightly hypertensive with a blood pressure 156/116 [JK]   Tue Oct 24, 2023   0439 CBC & Differential(!) [JK]   0439 Comprehensive Metabolic Panel(!) [JK]   0439 Lipase(!)  Laboratory studies were reviewed and interpreted directly by myself.  CBC shows a mild anemia with a hemoglobin of 11.3, CMP showed minor elevation creatinine 1.29, lipase normal [JK]   0439 CT Abdomen Pelvis Without Contrast  Imaging was directly visualized by myself, per my interpretations, CT of the abdomen pelvis showed large amount of liquid stools, however no  obstruction or blockage. [JK]   4944 On reevaluation, patient is feeling better.  He does not have any evidence of impaction or significant constipation.  No obstruction.  Patient be placed on a short course of stool softeners in order to encourage bowel movement.  Repeat exam shows no evidence of acute abdomen peritonitis.  Follow-up with PCP in 24 hours or return to the emergency department for repeat exam.  Verbalized understanding. [JK]   9310 I had a discussion with the patient/family regarding diagnosis, diagnostic results, treatment plan, and medications. The patient/family indicated understanding of these instructions. I spent adequate time at the bedside prior to discharge necessary to discuss the aftercare instructions, giving patient education, providing explanations of the results of our evaluations/findings, and my decision making to assure that the patient/family understand the plan of care. Time was allotted to answer questions at that time and throughout the ED course. Patient is required to maintain timely follow up, as discussed. I also discussed the potential for the development of an acute emergent condition requiring further evaluation, return to the ER, admission, or even surgical intervention.  I encouraged the patient to return to the emergency department immediately for any concerns, worsening symptoms, new complaints, or if symptoms persist and they are unable to seek follow-up in a timely fashion. The patient/family expressed understanding and agreement with this plan    Shared decision making:   After full review of the patient's clinical presentation, review of any work-up including but not limited to laboratory studies and radiology obtained, I had a discussion with the patient.  Treatment options were discussed as well as the risks, benefits and consequences.  I discussed all findings with the patient and family members if available.  During the discussion, treatment goals were  understood by all as well as any misconceptions which were addressed with the patient.  Ample time was given for any questions they may have had.  They are in agreement with the treatment plan as well as final disposition. [JK]      ED Course User Index  [JK] Geovanny Mlilan MD                                           Medical Decision Making  This is a 89-year-old male presented to the emergency department with some abdominal cramping and constipation.  It does appear that the patient has a chronic history of constipation.  He denies any changes to his medications recently.  He follows up with gastroenterology regularly.  Overall symptoms seem consistent with slow transit, however there is concern for possible bowel obstruction.  Patient's abdominal examination is benign.  Is no evidence of acute abdomen or peritonitis.  IV asked will be established the patient.  Work-up initiated.      Differential diagnosis: Peptic ulcer disease, dyspepsia, GERD, pancreatitis, biliary colic, electrolyte abnormality, acute kidney injury, bowel obstruction, constipation      Amount and/or Complexity of Data Reviewed  External Data Reviewed: labs, radiology and notes.     Details: External laboratories, imaging as well as notes were reviewed personally by myself.  All relevant studies were used to guide decision making.     Date of previous record: 8/5/2023    Source of note: Outside emergency department    Summary: Patient was seen and evaluated for similar constipation type symptoms.  I did review laboratory studies as well as CT on file.  Records reviewed    Labs: ordered. Decision-making details documented in ED Course.  Radiology: ordered and independent interpretation performed. Decision-making details documented in ED Course.    Risk  Prescription drug management.        Final diagnoses:   Chronic constipation       ED Disposition  ED Disposition       ED Disposition   Discharge    Condition   Stable    Comment   --                Arnaldo Wilson MD  [address] [place] [address] [address] [place] [address] [phone]    Call in 1 day           Medication List        New Prescriptions      magnesium citrate solution  Take 148 mL by mouth 2 (Two) Times a Day for 1 day.               Where to Get Your Medications        These medications were sent to [facility] [address] [place] [address] - [address] [facility] - [phone]  - [phone]   [address] [place] [id number]      Phone: [phone]   magnesium citrate solution            Malik Millan MD  10/24/23 8053

## 2023-11-16 ENCOUNTER — OFFICE VISIT (OUTPATIENT)
Dept: CARDIOLOGY | Facility: CLINIC | Age: 88
End: 2023-11-16
Payer: MEDICARE

## 2023-11-16 VITALS
WEIGHT: 260 LBS | DIASTOLIC BLOOD PRESSURE: 86 MMHG | HEART RATE: 74 BPM | BODY MASS INDEX: 36.4 KG/M2 | SYSTOLIC BLOOD PRESSURE: 175 MMHG | HEIGHT: 71 IN

## 2023-11-16 DIAGNOSIS — I25.118 CORONARY ARTERY DISEASE OF NATIVE ARTERY OF NATIVE HEART WITH STABLE ANGINA PECTORIS: Primary | ICD-10-CM

## 2023-11-16 DIAGNOSIS — I10 PRIMARY HYPERTENSION: ICD-10-CM

## 2023-11-16 DIAGNOSIS — I48.0 PAF (PAROXYSMAL ATRIAL FIBRILLATION): ICD-10-CM

## 2023-11-16 PROCEDURE — 99442 PR PHYS/QHP TELEPHONE EVALUATION 11-20 MIN: CPT | Performed by: INTERNAL MEDICINE

## 2023-11-16 RX ORDER — AMMONIUM LACTATE 12 G/100G
CREAM TOPICAL
COMMUNITY
Start: 2023-10-09

## 2023-11-16 NOTE — PROGRESS NOTES
OFFICE VISIT  NOTE  [facility] CARDIOLOGY      Name: Miguel Angel Crane    Date: 2023  MRN:  [id number]  :  1932      REFERRING/PRIMARY PROVIDER:  Arnaldo Wilson MD     Chief Complaint   Patient presents with    Paf     HPI: Miguel Angel Crane is a 89 y.o. male who is contacted via phone today for follow-up of paroxysmal atrial fibrillation.  Patient was admitted 2020 abdominal pain, found to have acute cholecystitis, underwent laparoscopic cholecystectomy, and ERCP for choledocholithiasis.  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 Isaak Minaya, .  He was started on Eliquis after discharge, no bleeding complications. He is mostly homebound and walks with a walker, denies any falls. Denies any cardiac symptoms, no chest pain, palpitations, or heart failure symptoms. He saw his PCP about a month ago, and had labs which are unavailable for review at this time.         ROS:Pertinent positives as listed in the HPI.  All other systems reviewed and negative.    Past Medical History:   Diagnosis Date    Arthritis     BPH (benign prostatic hyperplasia)     Cataracts, both eyes     Chronic constipation     CKD (chronic kidney disease), stage II     Coronary artery disease     Hypertension     Hypothyroidism     Kidney stones     Low back pain     Mass of pancreas     Myocardial infarct     PAF (paroxysmal atrial fibrillation) 2020    New diagnosis 2020    Stroke     left MCA, secondary to left carotid stenosis       Past Surgical History:   Procedure Laterality Date    APPENDECTOMY  2022    CAROTID STENT Left 2016    sx with prior CVA    CATARACT EXTRACTION W/ INTRAOCULAR LENS  IMPLANT, BILATERAL Bilateral     CHOLECYSTECTOMY N/A 2020    Procedure: CHOLECYSTECTOMY LAPAROSCOPIC WITH IOC ;  Surgeon: Arnaldo Gan MD;  Location: [facility]  OR;  Service: General    CYSTOSCOPY URETEROSCOPY Right 2019    Procedure: CYSTOSCOPY RIGHT URETERSCOPY RIGHT URETERAL STENT PLACEMENT;  Surgeon: Britton Saba MD;  Location:  [facility];  Service: Urology    ERCP N/A 2020    Procedure: ENDOSCOPIC RETROGRADE CHOLANGIOPANCREATOGRAPHY WITH CANNULATION, BALLOON SWEEP, AND STONE REMOVAL;  Surgeon: Fredy Schaffer MD;  Location: [facility] END[facility]COPY;  Service: Gastroenterology    EXTRACORPOREAL SHOCKWAVE LITHOTRIPSY (ESWL), STENT INSERTION/REMOVAL Right 11/15/2019    Procedure: EXTRACORPOREAL SHOCKWAVE LITHOTRIPSY WITH  STENT REMOVAL;  Surgeon: Britton Saba MD;  Location:  [facility];  Service: Urology    LUMBAR LAMINECTOMY DISCECTOMY DECOMPRESSION N/A 10/14/2016    Procedure: LUMBAR LAMINECTOMY  L3-L4;  Surgeon: George Richmond MD;  Location:  [facility];  Service:     TONSILLECTOMY         Social History     Socioeconomic History    Marital status:    Tobacco Use    Smoking status: Former     Packs/day: 0.50     Years: 42.00     Additional pack years: 0.00     Total pack years: 21.00     Types: Cigarettes     Quit date:      Years since quittin.8     Passive exposure: Past    Smokeless tobacco: Never    Tobacco comments:     quit 24 years ago   Vaping Use    Vaping Use: Never used   Substance and Sexual Activity    Alcohol use: Not Currently     Comment: 1988    Drug use: Never    Sexual activity: Defer       Family History   Problem Relation Age of Onset    Heart disease Mother     Cancer Mother     Heart disease Father     Cancer Father         Allergies   Allergen Reactions    Oxycodone GI Intolerance     constipation       Current Outpatient Medications   Medication Instructions    ammonium lactate (AMLACTIN) 12 % cream APPLY CREAM TOPICALLY TO NECK, ARMS AND LEGS ONCE DAILY    apixaban (Eliquis) 2.5 MG tablet tablet TAKE 1 TABLET BY MOUTH EVERY 12 HOURS    fluticasone (FLONASE) 50 MCG/ACT nasal spray USE 2 SPRAY(S)  "IN EACH NOSTRIL ONCE DAILY AT BEDTIME    gabapentin (NEURONTIN) 100 mg, Oral, 2 Times Daily    HYDROcodone-acetaminophen (NORCO) 7.5-325 MG per tablet 1 tablet, Oral, Every 6 Hours PRN    levothyroxine (SYNTHROID, LEVOTHROID) 50 mcg, Oral, Daily    magnesium hydroxide (MILK OF MAGNESIA) 2400 MG/10ML suspension suspension 10 mL, Oral, Daily PRN    metoprolol tartrate (LOPRESSOR) 12.5 mg, Oral, Every 12 Hours Scheduled    Movantik 25 MG tablet 1 tablet, Oral, Daily    tamsulosin (FLOMAX) 0.4 MG capsule 24 hr capsule 1 capsule, Oral, 2 times daily    triamcinolone (KENALOG) 0.025 % cream APPLY CREAM TOPICALLY EVERY OTHER DAY TO RASH AREAS ON BODY ONLY IF NEEDED    Trulance 3 mg, Oral, Daily        Vitals:    11/16/23 1555   BP: 175/86   BP Location: Right arm   Patient Position: Sitting   Cuff Size: Adult   Pulse: 74   Weight: 118 kg (260 lb)   Height: 180.3 cm (70.98\")     Body mass index is 36.28 kg/m².    PHYSICAL EXAM:    General Appearance:   In no acute distress    RESULTS:   Procedures    Results for orders placed during the hospital encounter of 01/07/20    Adult Transthoracic Echo Complete W/ Cont if Necessary Per Protocol    Interpretation Summary  · Left ventricular systolic function is normal. Calculated EF = 62.0%. Estimated EF appears to be in the range of 56 - 60%  · Left ventricular wall thickness is consistent with mild to moderate septal asymmetric hypertrophy.  · Left ventricular diastolic dysfunction is noted (grade I) consistent with impaired relaxation.  · Mild MAC is present. Trace mitral valve regurgitation is present.    Labs:  Lab Results   Component Value Date    AST 15 10/24/2023    ALT 7 10/24/2023     Lab Results   Component Value Date    HGBA1C 6.00 10/13/2016     Creatinine   Date Value Ref Range Status   10/24/2023 1.29 (H) 0.76 - 1.27 mg/dL Final   01/28/2020 1.50 (H) 0.76 - 1.27 mg/dL Final   01/11/2020 1.46 (H) 0.76 - 1.27 mg/dL Final     eGFR Non  Amer   Date Value Ref Range " Status   01/28/2020 44 (L) >60 mL/min/1.73 Final   01/11/2020 46 (L) >60 mL/min/1.73 Final   01/10/2020 47 (L) >60 mL/min/1.73 Final       ASSESSMENT:  Problem List Items Addressed This Visit       Coronary artery disease - Primary    Hypertension    PAF (paroxysmal atrial fibrillation)    Overview     01/08/20 Echo  LVEF = 62.0%.  Left ventricular diastolic dysfunction is noted (grade I) consistent with impaired relaxation.              PLAN:  1.  Paroxysmal atrial fibrillation:  Spontaneously converted during hospitalization 1/2020 for acute cholecystitis  Continue Eliquis 2.5mg BID, refilled recently.  Continue metoprolol 12.5 mg twice daily  He denies any recurrent palpitations, and no bleeding issues or falls   Will try to obtain most recent lab results from PCP office      Okay to hold Eliquis 48 hours prior to procedures if needed.     Echocardiogram dated 1/8/2020 reviewed, EF 60%, grade 1 diastolic dysfunction, moderate LVH     2.  Carotid stenosis, left carotid stenosis status post stent placement in 2012  Last carotid duplex 9/2016 showed no evidence of hemodynamically significant stenosis of bilateral carotids, bilateral scattered plaque noted.     3.  Chronic diastolic heart failure, grade 1:  Continue metoprolol 12.5 mg twice daily  Increase exercise, decrease sodium intake  He denies any heart failure symptoms currently      4.  Chronic low back pain:  Continue follow-up with PCP and neurosurgery.         Follow-up   Return in about 1 year (around 11/16/2024).  In office visit with EKG      This patient has consented to a telehealth visit via phone. The visit was scheduled as a phone visit to comply with patient safety concerns in accordance with CDC recommendations.  All vitals recorded within this visit are reported by the patient.  I spent 10 minutes in total including but not limited to the 5 minutes spent in direct conversation with this patient.     Zack Kruger MD, FAC, [facility]  Interventional  Cardiology

## 2024-09-05 ENCOUNTER — TELEPHONE (OUTPATIENT)
Dept: CARDIOLOGY | Facility: CLINIC | Age: 89
End: 2024-09-05
Payer: MEDICARE

## 2024-09-05 NOTE — TELEPHONE ENCOUNTER
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 Julio C Dolan P: [phone], they are requesting pt hold Eliquis 2 days prior.     Per DIMITRI 11/16/2023-   Okay to hold Eliquis 48 hours prior to procedures if needed.     Letter faxed to [phone]

## 2024-09-05 NOTE — LETTER
09/05/24        Fax # - [phone]    Re: Miguel Angel Crane, 4/9/1932   Procedure/Surgery - local excision of facial cancer        Dear Dr. Dolan,     Miguel Angel Crane, 4/9/1932       [x]  Hold Eliquis 2 Days          Any questions please call [phone].    Sincerely,          MD Bhanva Terrell RN

## 2024-10-30 RX ORDER — APIXABAN 2.5 MG/1
2.5 TABLET, FILM COATED ORAL EVERY 12 HOURS SCHEDULED
Qty: 180 TABLET | Refills: 0 | Status: SHIPPED | OUTPATIENT
Start: 2024-10-30

## 2024-12-31 ENCOUNTER — TELEPHONE (OUTPATIENT)
Dept: CARDIOLOGY | Facility: CLINIC | Age: 89
End: 2024-12-31
Payer: MEDICARE

## 2025-01-02 ENCOUNTER — OFFICE VISIT (OUTPATIENT)
Dept: CARDIOLOGY | Facility: CLINIC | Age: OVER 89
End: 2025-01-02
Payer: MEDICARE

## 2025-01-02 VITALS
WEIGHT: 220 LBS | DIASTOLIC BLOOD PRESSURE: 71 MMHG | BODY MASS INDEX: 30.8 KG/M2 | HEIGHT: 71 IN | SYSTOLIC BLOOD PRESSURE: 154 MMHG | HEART RATE: 53 BPM

## 2025-01-02 DIAGNOSIS — I25.118 CORONARY ARTERY DISEASE OF NATIVE ARTERY OF NATIVE HEART WITH STABLE ANGINA PECTORIS: Primary | ICD-10-CM

## 2025-01-02 DIAGNOSIS — I48.0 PAF (PAROXYSMAL ATRIAL FIBRILLATION): ICD-10-CM

## 2025-01-02 RX ORDER — FINASTERIDE 5 MG/1
1 TABLET, FILM COATED ORAL DAILY
COMMUNITY
Start: 2024-12-05

## 2025-01-02 RX ORDER — HYDROCODONE BITARTRATE AND ACETAMINOPHEN 10; 325 MG/1; MG/1
1-2 TABLET ORAL EVERY 6 HOURS PRN
COMMUNITY
Start: 2024-12-11

## 2025-01-02 NOTE — PROGRESS NOTES
OFFICE VISIT  NOTE  [facility] CARDIOLOGY      Name: Miguel Angel Crane    Date: 2025  MRN:  [id number]  :  1932      REFERRING/PRIMARY PROVIDER:  Arnaldo Wilson MD     Chief Complaint   Patient presents with    Coronary artery disease of native artery of native heart wi     HPI: Miguel Angel Crane is a 89 y.o. male who is contacted via phone today for follow-up of paroxysmal atrial fibrillation.  Patient was admitted 2020 abdominal pain, found to have acute cholecystitis, underwent laparoscopic cholecystectomy, and ERCP for choledocholithiasis.  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 Isaak Minaya, .  He was started on Eliquis after discharge, no bleeding complications. He is mostly homebound and walks with a walker, denies any falls.     Denies any recent cardiac complaints, both he and his wife are admitted for flu in November but recovering well from that.    ROS:Pertinent positives as listed in the HPI.  All other systems reviewed and negative.    Past Medical History:   Diagnosis Date    Arthritis     BPH (benign prostatic hyperplasia)     Cataracts, both eyes     Chronic constipation     CKD (chronic kidney disease), stage II     Coronary artery disease     Hypertension     Hypothyroidism     Kidney stones     Low back pain     Mass of pancreas     Myocardial infarct     PAF (paroxysmal atrial fibrillation) 2020    New diagnosis 2020    Stroke     left MCA, secondary to left carotid stenosis       Past Surgical History:   Procedure Laterality Date    APPENDECTOMY  2022    CAROTID STENT Left 2016    sx with prior CVA    CATARACT EXTRACTION W/ INTRAOCULAR LENS  IMPLANT, BILATERAL Bilateral     CHOLECYSTECTOMY N/A 2020    Procedure: CHOLECYSTECTOMY LAPAROSCOPIC WITH IOC ;  Surgeon: Arnaldo Gan MD;  Location: [facility]  OR;  Service: General    CYSTOSCOPY URETEROSCOPY Right 2019    Procedure: CYSTOSCOPY RIGHT URETERSCOPY RIGHT URETERAL STENT PLACEMENT;  Surgeon: Britton Saba MD;  Location:  [facility];  Service: Urology    ERCP N/A 2020    Procedure: ENDOSCOPIC RETROGRADE CHOLANGIOPANCREATOGRAPHY WITH CANNULATION, BALLOON SWEEP, AND STONE REMOVAL;  Surgeon: Fredy Schaffer MD;  Location:  [facility];  Service: Gastroenterology    EXTRACORPOREAL SHOCKWAVE LITHOTRIPSY (ESWL), STENT INSERTION/REMOVAL Right 11/15/2019    Procedure: EXTRACORPOREAL SHOCKWAVE LITHOTRIPSY WITH  STENT REMOVAL;  Surgeon: Britton Saba MD;  Location:  [facility];  Service: Urology    LUMBAR LAMINECTOMY DISCECTOMY DECOMPRESSION N/A 10/14/2016    Procedure: LUMBAR LAMINECTOMY  L3-L4;  Surgeon: George Richmond MD;  Location:  [facility];  Service:     SQUAMOUS CELL CARCINOMA EXCISION      forehead    TONSILLECTOMY         Social History     Socioeconomic History    Marital status:    Tobacco Use    Smoking status: Former     Current packs/day: 0.00     Average packs/day: 0.5 packs/day for 42.0 years (21.0 ttl pk-yrs)     Types: Cigarettes     Start date:      Quit date:      Years since quittin.0     Passive exposure: Past    Smokeless tobacco: Never    Tobacco comments:     quit 24 years ago   Vaping Use    Vaping status: Never Used   Substance and Sexual Activity    Alcohol use: Not Currently     Comment: 1988    Drug use: Never    Sexual activity: Defer       Family History   Problem Relation Age of Onset    Heart disease Mother     Cancer Mother     Heart disease Father     Cancer Father         Allergies   Allergen Reactions    Oxycodone GI Intolerance     constipation       Current Outpatient Medications   Medication Instructions    ammonium lactate (AMLACTIN) 12 % cream APPLY CREAM TOPICALLY TO NECK, ARMS AND LEGS ONCE DAILY    Eliquis 2.5 mg, Oral, Every 12 Hours Scheduled    finasteride  "(PROSCAR) 5 MG tablet 1 tablet, Daily    fluticasone (FLONASE) 50 MCG/ACT nasal spray USE 2 SPRAY(S) IN EACH NOSTRIL ONCE DAILY AT BEDTIME    gabapentin (NEURONTIN) 100 mg, 2 Times Daily    HYDROcodone-acetaminophen (NORCO)  MG per tablet 1-2 tablets, Every 6 Hours PRN    HYDROcodone-acetaminophen (NORCO) 7.5-325 MG per tablet 1 tablet, Oral, Every 6 Hours PRN    levothyroxine (SYNTHROID, LEVOTHROID) 50 mcg, Daily    magnesium hydroxide (MILK OF MAGNESIA) 2400 MG/10ML suspension suspension 10 mL, Daily PRN    metoprolol tartrate (LOPRESSOR) 12.5 mg, Oral, Every 12 Hours Scheduled    Movantik 25 MG tablet 1 tablet, Daily    tamsulosin (FLOMAX) 0.4 MG capsule 24 hr capsule 1 capsule, 2 times daily    triamcinolone (KENALOG) 0.025 % cream APPLY CREAM TOPICALLY EVERY OTHER DAY TO RASH AREAS ON BODY ONLY IF NEEDED    Trulance 3 mg, Daily        Vitals:    01/02/25 1548   BP: 154/71   BP Location: Left arm   Patient Position: Sitting   Cuff Size: Adult   Pulse: 53   Weight: 99.8 kg (220 lb)   Height: 180.3 cm (71\")     Body mass index is 30.68 kg/m².    PHYSICAL EXAM:    General Appearance:   In no acute distress telephone visit only    RESULTS:   Procedures    Results for orders placed during the hospital encounter of 01/07/20    Adult Transthoracic Echo Complete W/ Cont if Necessary Per Protocol    Interpretation Summary  · Left ventricular systolic function is normal. Calculated EF = 62.0%. Estimated EF appears to be in the range of 56 - 60%  · Left ventricular wall thickness is consistent with mild to moderate septal asymmetric hypertrophy.  · Left ventricular diastolic dysfunction is noted (grade I) consistent with impaired relaxation.  · Mild MAC is present. Trace mitral valve regurgitation is present.    Labs:  Lab Results   Component Value Date    AST 15 10/24/2023    ALT 7 10/24/2023     Lab Results   Component Value Date    HGBA1C 6.00 10/13/2016     Creatinine   Date Value Ref Range Status   10/24/2023 " 1.29 (H) 0.76 - 1.27 mg/dL Final   01/28/2020 1.50 (H) 0.76 - 1.27 mg/dL Final   01/11/2020 1.46 (H) 0.76 - 1.27 mg/dL Final     eGFR Non  Amer   Date Value Ref Range Status   01/28/2020 44 (L) >60 mL/min/1.73 Final   01/11/2020 46 (L) >60 mL/min/1.73 Final   01/10/2020 47 (L) >60 mL/min/1.73 Final       ASSESSMENT:  Problem List Items Addressed This Visit       Coronary artery disease - Primary    PAF (paroxysmal atrial fibrillation)    Overview     01/08/20 Echo  LVEF = 62.0%.  Left ventricular diastolic dysfunction is noted (grade I) consistent with impaired relaxation.              PLAN:  1.  Paroxysmal atrial fibrillation:  Spontaneously converted during hospitalization 1/2020 for acute cholecystitis  Recommend continuing low-dose Eliquis and low-dose metoprolol at current doses, patient will follow-up with his primary care physician for refills.     Okay to hold Eliquis 48 hours prior to procedures if needed.     Echocardiogram dated 1/8/2020 reviewed, EF 60%, grade 1 diastolic dysfunction, moderate LVH     2.  Carotid stenosis, left carotid stenosis status post stent placement in 2012  Last carotid duplex 9/2016 showed no evidence of hemodynamically significant stenosis of bilateral carotids, bilateral scattered plaque noted.     3.  Chronic diastolic heart failure, grade 1:  Continue metoprolol 12.5 mg twice daily  Increase exercise, decrease sodium intake  He denies any heart failure symptoms currently      4.  Chronic low back pain:  Continue follow-up with PCP and neurosurgery.         Follow-up   No need for cardiology follow-up unless there are complications, he will follow-up with his PCP regarding refills for metoprolol (12.5 mg twice daily) and Eliquis 2.5 mg twice daily.      This patient has consented to a telehealth visit via phone. The visit was scheduled as a phone visit to comply with patient safety concerns in accordance with CDC recommendations.  All vitals recorded within this visit  are reported by the patient.  I spent 10 minutes in total including but not limited to the 5 minutes spent in direct conversation with this patient.     Zack Kruger MD, FACC, [facility]  Interventional Cardiology

## 2025-02-11 RX ORDER — APIXABAN 2.5 MG/1
2.5 TABLET, FILM COATED ORAL EVERY 12 HOURS SCHEDULED
Qty: 180 TABLET | Refills: 0 | Status: SHIPPED | OUTPATIENT
Start: 2025-02-11

## 2025-04-13 ENCOUNTER — APPOINTMENT (OUTPATIENT)
Dept: CT IMAGING | Facility: HOSPITAL | Age: OVER 89
End: 2025-04-13
Payer: MEDICARE

## 2025-04-13 ENCOUNTER — HOSPITAL ENCOUNTER (EMERGENCY)
Facility: HOSPITAL | Age: OVER 89
Discharge: HOME | End: 2025-04-13
Attending: EMERGENCY MEDICINE | Admitting: EMERGENCY MEDICINE
Payer: MEDICARE

## 2025-04-13 VITALS
OXYGEN SATURATION: 96 % | HEART RATE: 63 BPM | SYSTOLIC BLOOD PRESSURE: 152 MMHG | HEIGHT: 71 IN | BODY MASS INDEX: 30.8 KG/M2 | DIASTOLIC BLOOD PRESSURE: 81 MMHG | RESPIRATION RATE: 18 BRPM | TEMPERATURE: 98.5 F | WEIGHT: 220 LBS

## 2025-04-13 DIAGNOSIS — N18.9 CHRONIC KIDNEY DISEASE, UNSPECIFIED CKD STAGE: ICD-10-CM

## 2025-04-13 DIAGNOSIS — N30.00 ACUTE CYSTITIS WITHOUT HEMATURIA: ICD-10-CM

## 2025-04-13 DIAGNOSIS — R54 ADVANCED AGE: ICD-10-CM

## 2025-04-13 DIAGNOSIS — R10.84 ACUTE GENERALIZED ABDOMINAL PAIN: Primary | ICD-10-CM

## 2025-04-13 LAB
ALBUMIN SERPL-MCNC: 3.8 G/DL (ref 3.5–5.2)
ALBUMIN/GLOB SERPL: 1.7 G/DL
ALP SERPL-CCNC: 73 U/L (ref 39–117)
ALT SERPL W P-5'-P-CCNC: 8 U/L (ref 1–41)
ANION GAP SERPL CALCULATED.3IONS-SCNC: 10 MMOL/L (ref 5–15)
AST SERPL-CCNC: 16 U/L (ref 1–40)
BACTERIA UR QL AUTO: ABNORMAL /HPF
BASOPHILS # BLD AUTO: 0.03 10*3/MM3 (ref 0–0.2)
BASOPHILS NFR BLD AUTO: 0.8 % (ref 0–1.5)
BILIRUB SERPL-MCNC: 0.6 MG/DL (ref 0–1.2)
BILIRUB UR QL STRIP: NEGATIVE
BUN SERPL-MCNC: 17 MG/DL (ref 8–23)
BUN/CREAT SERPL: 12.5 (ref 7–25)
CALCIUM SPEC-SCNC: 8.7 MG/DL (ref 8.2–9.6)
CHLORIDE SERPL-SCNC: 103 MMOL/L (ref 98–107)
CLARITY UR: CLEAR
CO2 SERPL-SCNC: 26 MMOL/L (ref 22–29)
COLOR UR: YELLOW
CREAT BLDA-MCNC: 1.5 MG/DL (ref 0.6–1.3)
CREAT SERPL-MCNC: 1.36 MG/DL (ref 0.76–1.27)
D-LACTATE SERPL-SCNC: 1 MMOL/L (ref 0.5–2)
DEPRECATED RDW RBC AUTO: 49 FL (ref 37–54)
EGFRCR SERPLBLD CKD-EPI 2021: 48.5 ML/MIN/1.73
EOSINOPHIL # BLD AUTO: 0.31 10*3/MM3 (ref 0–0.4)
EOSINOPHIL NFR BLD AUTO: 8.1 % (ref 0.3–6.2)
ERYTHROCYTE [DISTWIDTH] IN BLOOD BY AUTOMATED COUNT: 13.4 % (ref 12.3–15.4)
GLOBULIN UR ELPH-MCNC: 2.3 GM/DL
GLUCOSE SERPL-MCNC: 101 MG/DL (ref 65–99)
GLUCOSE UR STRIP-MCNC: NEGATIVE MG/DL
HCT VFR BLD AUTO: 34.9 % (ref 37.5–51)
HGB BLD-MCNC: 11.6 G/DL (ref 13–17.7)
HGB UR QL STRIP.AUTO: ABNORMAL
HYALINE CASTS UR QL AUTO: ABNORMAL /LPF
IMM GRANULOCYTES # BLD AUTO: 0.01 10*3/MM3 (ref 0–0.05)
IMM GRANULOCYTES NFR BLD AUTO: 0.3 % (ref 0–0.5)
KETONES UR QL STRIP: NEGATIVE
LEUKOCYTE ESTERASE UR QL STRIP.AUTO: ABNORMAL
LIPASE SERPL-CCNC: 15 U/L (ref 13–60)
LYMPHOCYTES # BLD AUTO: 0.96 10*3/MM3 (ref 0.7–3.1)
LYMPHOCYTES NFR BLD AUTO: 25 % (ref 19.6–45.3)
MCH RBC QN AUTO: 33.1 PG (ref 26.6–33)
MCHC RBC AUTO-ENTMCNC: 33.2 G/DL (ref 31.5–35.7)
MCV RBC AUTO: 99.7 FL (ref 79–97)
MONOCYTES # BLD AUTO: 0.34 10*3/MM3 (ref 0.1–0.9)
MONOCYTES NFR BLD AUTO: 8.9 % (ref 5–12)
NEUTROPHILS NFR BLD AUTO: 2.19 10*3/MM3 (ref 1.7–7)
NEUTROPHILS NFR BLD AUTO: 56.9 % (ref 42.7–76)
NITRITE UR QL STRIP: NEGATIVE
NRBC BLD AUTO-RTO: 0 /100 WBC (ref 0–0.2)
PH UR STRIP.AUTO: 7.5 [PH] (ref 5–8)
PLATELET # BLD AUTO: 95 10*3/MM3 (ref 140–450)
PMV BLD AUTO: 9.6 FL (ref 6–12)
POTASSIUM SERPL-SCNC: 4.2 MMOL/L (ref 3.5–5.2)
PROT SERPL-MCNC: 6.1 G/DL (ref 6–8.5)
PROT UR QL STRIP: NEGATIVE
RBC # BLD AUTO: 3.5 10*6/MM3 (ref 4.14–5.8)
RBC # UR STRIP: ABNORMAL /HPF
REF LAB TEST METHOD: ABNORMAL
SODIUM SERPL-SCNC: 139 MMOL/L (ref 136–145)
SP GR UR STRIP: 1.01 (ref 1–1.03)
SQUAMOUS #/AREA URNS HPF: ABNORMAL /HPF
UROBILINOGEN UR QL STRIP: ABNORMAL
WBC # UR STRIP: ABNORMAL /HPF
WBC NRBC COR # BLD AUTO: 3.84 10*3/MM3 (ref 3.4–10.8)

## 2025-04-13 PROCEDURE — 25510000001 IOPAMIDOL 61 % SOLUTION: Performed by: EMERGENCY MEDICINE

## 2025-04-13 PROCEDURE — 99285 EMERGENCY DEPT VISIT HI MDM: CPT

## 2025-04-13 PROCEDURE — 83605 ASSAY OF LACTIC ACID: CPT | Performed by: EMERGENCY MEDICINE

## 2025-04-13 PROCEDURE — 85025 COMPLETE CBC W/AUTO DIFF WBC: CPT | Performed by: EMERGENCY MEDICINE

## 2025-04-13 PROCEDURE — 87186 SC STD MICRODIL/AGAR DIL: CPT | Performed by: EMERGENCY MEDICINE

## 2025-04-13 PROCEDURE — 80053 COMPREHEN METABOLIC PANEL: CPT | Performed by: EMERGENCY MEDICINE

## 2025-04-13 PROCEDURE — 82565 ASSAY OF CREATININE: CPT

## 2025-04-13 PROCEDURE — 87086 URINE CULTURE/COLONY COUNT: CPT | Performed by: EMERGENCY MEDICINE

## 2025-04-13 PROCEDURE — 87077 CULTURE AEROBIC IDENTIFY: CPT | Performed by: EMERGENCY MEDICINE

## 2025-04-13 PROCEDURE — 83690 ASSAY OF LIPASE: CPT | Performed by: EMERGENCY MEDICINE

## 2025-04-13 PROCEDURE — 74177 CT ABD & PELVIS W/CONTRAST: CPT

## 2025-04-13 PROCEDURE — 81001 URINALYSIS AUTO W/SCOPE: CPT | Performed by: EMERGENCY MEDICINE

## 2025-04-13 RX ORDER — CEFUROXIME AXETIL 500 MG/1
500 TABLET ORAL 2 TIMES DAILY
Qty: 20 TABLET | Refills: 0 | Status: SHIPPED | OUTPATIENT
Start: 2025-04-13 | End: 2025-04-23

## 2025-04-13 RX ORDER — IOPAMIDOL 612 MG/ML
100 INJECTION, SOLUTION INTRAVASCULAR
Status: COMPLETED | OUTPATIENT
Start: 2025-04-13 | End: 2025-04-13

## 2025-04-13 RX ORDER — CEFUROXIME AXETIL 250 MG/1
500 TABLET ORAL ONCE
Status: COMPLETED | OUTPATIENT
Start: 2025-04-13 | End: 2025-04-13

## 2025-04-13 RX ADMIN — IOPAMIDOL 85 ML: 612 INJECTION, SOLUTION INTRAVENOUS at 11:54

## 2025-04-13 RX ADMIN — CEFUROXIME AXETIL 500 MG: 250 TABLET, FILM COATED ORAL at 13:29

## 2025-04-13 NOTE — ED PROVIDER NOTES
[facility]    EMERGENCY DEPARTMENT ENCOUNTER      Pt Name: Miguel Angel Crane  MRN: [id number]  YOB: 1932  Date of evaluation: 4/13/2025  Provider: Aldo Beck MD    CHIEF COMPLAINT       Chief Complaint   Patient presents with    Constipation         HISTORY OF PRESENT ILLNESS   Miguel Angel Crane is a 89 y.o. male who presents to the emergency department with complaint of acute on chronic constipation.  This is a chronic issue for the patient and has been going on for a long time.  He is on chronic narcotic therapy for chronic low back pain and takes multiple stool softeners and regularly uses laxatives and suppositories.  Today, he is having some pain extending into his right flank which she says is typical for him when he gets very constipated.  He has had some nausea but no vomiting, fever, or chills.  He denies any associated chest pain or difficulty breathing.  He occasionally has some difficulty emptying his bladder due to prostate issues and has had to be catheterized in the past but says that he is urinating without difficulty this morning and feels that he is able to completely empty his bladder.      Nursing notes were reviewed.    REVIEW OF SYSTEMS     ROS:  A chief complaint appropriate review of systems was completed and is negative except as noted in the HPI.      PAST MEDICAL HISTORY     Past Medical History:   Diagnosis Date    Arthritis     BPH (benign prostatic hyperplasia)     Cataracts, both eyes     Chronic constipation     CKD (chronic kidney disease), stage II     Coronary artery disease     Hypertension     Hypothyroidism     Kidney stones     Low back pain     Mass of pancreas     Myocardial infarct 1993    PAF (paroxysmal atrial fibrillation) 1/9/2020    New diagnosis 1/7/2020    Stroke     left MCA, secondary to left carotid stenosis         SURGICAL HISTORY       Past Surgical History:   Procedure Laterality Date    APPENDECTOMY  07/2022    CAROTID STENT Left 02/03/2016    sx  with prior CVA    CATARACT EXTRACTION W/ INTRAOCULAR LENS  IMPLANT, BILATERAL Bilateral     CHOLECYSTECTOMY N/A 01/08/2020    Procedure: CHOLECYSTECTOMY LAPAROSCOPIC WITH IOC ;  Surgeon: Arnaldo Gan MD;  Location:  [facility];  Service: General    CYSTOSCOPY URETEROSCOPY Right 11/06/2019    Procedure: CYSTOSCOPY RIGHT URETERSCOPY RIGHT URETERAL STENT PLACEMENT;  Surgeon: Britton Saba MD;  Location:  [facility];  Service: Urology    ERCP N/A 01/09/2020    Procedure: ENDOSCOPIC RETROGRADE CHOLANGIOPANCREATOGRAPHY WITH CANNULATION, BALLOON SWEEP, AND STONE REMOVAL;  Surgeon: Fredy Schaffer MD;  Location:  [facility];  Service: Gastroenterology    EXTRACORPOREAL SHOCKWAVE LITHOTRIPSY (ESWL), STENT INSERTION/REMOVAL Right 11/15/2019    Procedure: EXTRACORPOREAL SHOCKWAVE LITHOTRIPSY WITH  STENT REMOVAL;  Surgeon: Britton Saba MD;  Location:  [facility];  Service: Urology    LUMBAR LAMINECTOMY DISCECTOMY DECOMPRESSION N/A 10/14/2016    Procedure: LUMBAR LAMINECTOMY  L3-L4;  Surgeon: George Richmond MD;  Location:  [facility];  Service:     SQUAMOUS CELL CARCINOMA EXCISION      forehead    TONSILLECTOMY           CURRENT MEDICATIONS       Current Facility-Administered Medications:     cefuroxime (CEFTIN) tablet 500 mg, 500 mg, Oral, Once, Aldo Beck MD    Current Outpatient Medications:     ammonium lactate (AMLACTIN) 12 % cream, APPLY CREAM TOPICALLY TO NECK, ARMS AND LEGS ONCE DAILY, Disp: , Rfl:     cefuroxime (CEFTIN) 500 MG tablet, Take 1 tablet by mouth 2 (Two) Times a Day for 10 days., Disp: 20 tablet, Rfl: 0    Eliquis 2.5 MG tablet tablet, TAKE 1 TABLET BY MOUTH EVERY 12 HOURS, Disp: 180 tablet, Rfl: 0    finasteride (PROSCAR) 5 MG tablet, Take 1 tablet by mouth Daily., Disp: , Rfl:     fluticasone (FLONASE) 50 MCG/ACT nasal spray, USE 2 SPRAY(S) IN EACH NOSTRIL ONCE DAILY AT BEDTIME, Disp: , Rfl:     gabapentin (NEURONTIN) 100 MG capsule, Take 1 capsule by mouth 2  (Two) Times a Day., Disp: , Rfl:     HYDROcodone-acetaminophen (NORCO)  MG per tablet, Take 1-2 tablets by mouth Every 6 (Six) Hours As Needed., Disp: , Rfl:     HYDROcodone-acetaminophen (NORCO) 7.5-325 MG per tablet, Take 1 tablet by mouth Every 6 (Six) Hours As Needed for Moderate Pain ., Disp: 120 tablet, Rfl: 0    levothyroxine (SYNTHROID, LEVOTHROID) 50 MCG tablet, Take 1 tablet by mouth Daily., Disp: , Rfl:     magnesium hydroxide (MILK OF MAGNESIA) 2400 MG/10ML suspension suspension, Take 10 mL by mouth Daily As Needed., Disp: , Rfl:     metoprolol tartrate (LOPRESSOR) 25 MG tablet, Take 1/2 (one-half) tablet by mouth twice daily, Disp: 90 tablet, Rfl: 3    Movantik 25 MG tablet, Take 1 tablet by mouth Daily., Disp: , Rfl:     Plecanatide (Trulance) 3 MG tablet, Take 1 tablet by mouth Daily., Disp: , Rfl:     tamsulosin (FLOMAX) 0.4 MG capsule 24 hr capsule, Take 1 capsule by mouth 2 (two) times a day., Disp: , Rfl:     triamcinolone (KENALOG) 0.025 % cream, APPLY CREAM TOPICALLY EVERY OTHER DAY TO RASH AREAS ON BODY ONLY IF NEEDED, Disp: , Rfl:     ALLERGIES     Oxycodone    FAMILY HISTORY       Family History   Problem Relation Age of Onset    Heart disease Mother     Cancer Mother     Heart disease Father     Cancer Father           SOCIAL HISTORY       Social History     Socioeconomic History    Marital status:    Tobacco Use    Smoking status: Former     Current packs/day: 0.00     Average packs/day: 0.5 packs/day for 42.0 years (21.0 ttl pk-yrs)     Types: Cigarettes     Start date:      Quit date:      Years since quittin.3     Passive exposure: Past    Smokeless tobacco: Never    Tobacco comments:     quit 24 years ago   Vaping Use    Vaping status: Never Used   Substance and Sexual Activity    Alcohol use: Not Currently     Comment: 1988    Drug use: Never    Sexual activity: Defer         PHYSICAL EXAM    (up to 7 for level 4, 8 or more for level 5)     Vitals:     04/13/25 1100 04/13/25 1130 04/13/25 1230 04/13/25 1300   BP:  152/81     BP Location:       Patient Position:       Pulse: 68 58 61 63   Resp:       Temp:       TempSrc:       SpO2: 96% 91% 90% 96%   Weight:       Height:           General: Awake, alert, no acute distress.  HEENT: Conjunctivae normal.  Neck: Trachea midline.  Cardiac: Heart regular rate, rhythm, no murmurs, rubs, or gallops  Lungs: Lungs are clear to auscultation, there is no wheezing, rhonchi, or rales. There is no use of accessory muscles.  Chest wall: There is no tenderness to palpation over the chest wall or over ribs  Abdomen: Mild diffuse abdominal tenderness.  There is no distention.  There is no rebound or guarding.  Musculoskeletal: No deformity.  Neuro: Alert and oriented x 4.  Dermatology: Skin is warm and dry  Psych: Mentation is grossly normal, cognition is grossly normal. Affect is appropriate.        DIAGNOSTIC RESULTS     EKG: All EKGs are interpreted by the Emergency Department Physician who either signs or Co-signs this chart in the absence of a cardiologist.    No orders to display         RADIOLOGY:   [x] Radiologist's Report Reviewed:  CT Abdomen Pelvis With Contrast   Final Result   Impression:   No acute findings in the abdomen or pelvis.      Stable appearing hypoattenuating lesion in the pancreatic body measuring up to 3 cm.      Bilateral nonobstructing nephrolithiasis.         Electronically Signed: Estrada Hector     4/13/2025 12:13 PM EDT     Workstation ID: [id number]          I ordered and independently reviewed the above noted radiographic studies.        LABS:    I have reviewed and interpreted all of the currently available lab results from this visit (if applicable):  Results for orders placed or performed during the hospital encounter of 04/13/25   Comprehensive Metabolic Panel    Collection Time: 04/13/25 10:52 AM    Specimen: Blood   Result Value Ref Range    Glucose 101 (H) 65 - 99 mg/dL    BUN 17 8 - 23 mg/dL     Creatinine 1.36 (H) 0.76 - 1.27 mg/dL    Sodium 139 136 - 145 mmol/L    Potassium 4.2 3.5 - 5.2 mmol/L    Chloride 103 98 - 107 mmol/L    CO2 26.0 22.0 - 29.0 mmol/L    Calcium 8.7 8.2 - 9.6 mg/dL    Total Protein 6.1 6.0 - 8.5 g/dL    Albumin 3.8 3.5 - 5.2 g/dL    ALT (SGPT) 8 1 - 41 U/L    AST (SGOT) 16 1 - 40 U/L    Alkaline Phosphatase 73 39 - 117 U/L    Total Bilirubin 0.6 0.0 - 1.2 mg/dL    Globulin 2.3 gm/dL    A/G Ratio 1.7 g/dL    BUN/Creatinine Ratio 12.5 7.0 - 25.0    Anion Gap 10.0 5.0 - 15.0 mmol/L    eGFR 48.5 (L) >60.0 mL/min/1.73   Lipase    Collection Time: 04/13/25 10:52 AM    Specimen: Blood   Result Value Ref Range    Lipase 15 13 - 60 U/L   Lactic Acid, Plasma    Collection Time: 04/13/25 10:52 AM    Specimen: Blood   Result Value Ref Range    Lactate 1.0 0.5 - 2.0 mmol/L   CBC Auto Differential    Collection Time: 04/13/25 10:52 AM    Specimen: Blood   Result Value Ref Range    WBC 3.84 3.40 - 10.80 10*3/mm3    RBC 3.50 (L) 4.14 - 5.80 10*6/mm3    Hemoglobin 11.6 (L) 13.0 - 17.7 g/dL    Hematocrit 34.9 (L) 37.5 - 51.0 %    MCV 99.7 (H) 79.0 - 97.0 fL    MCH 33.1 (H) 26.6 - 33.0 pg    MCHC 33.2 31.5 - 35.7 g/dL    RDW 13.4 12.3 - 15.4 %    RDW-SD 49.0 37.0 - 54.0 fl    MPV 9.6 6.0 - 12.0 fL    Platelets 95 (L) 140 - 450 10*3/mm3    Neutrophil % 56.9 42.7 - 76.0 %    Lymphocyte % 25.0 19.6 - 45.3 %    Monocyte % 8.9 5.0 - 12.0 %    Eosinophil % 8.1 (H) 0.3 - 6.2 %    Basophil % 0.8 0.0 - 1.5 %    Immature Grans % 0.3 0.0 - 0.5 %    Neutrophils, Absolute 2.19 1.70 - 7.00 10*3/mm3    Lymphocytes, Absolute 0.96 0.70 - 3.10 10*3/mm3    Monocytes, Absolute 0.34 0.10 - 0.90 10*3/mm3    Eosinophils, Absolute 0.31 0.00 - 0.40 10*3/mm3    Basophils, Absolute 0.03 0.00 - 0.20 10*3/mm3    Immature Grans, Absolute 0.01 0.00 - 0.05 10*3/mm3    nRBC 0.0 0.0 - 0.2 /100 WBC   POC Creatinine    Collection Time: 04/13/25 10:54 AM    Specimen: Blood   Result Value Ref Range    Creatinine 1.50 (H) 0.60 - 1.30  mg/dL   Urinalysis With Microscopic If Indicated (No Culture) - Urine, Clean Catch    Collection Time: 04/13/25 11:03 AM    Specimen: Urine, Clean Catch   Result Value Ref Range    Color, UA Yellow Yellow, Straw    Appearance, UA Clear Clear    pH, UA 7.5 5.0 - 8.0    Specific Gravity, UA 1.012 1.001 - 1.030    Glucose, UA Negative Negative    Ketones, UA Negative Negative    Bilirubin, UA Negative Negative    Blood, UA Trace (A) Negative    Protein, UA Negative Negative    Leuk Esterase, UA Large (3+) (A) Negative    Nitrite, UA Negative Negative    Urobilinogen, UA 0.2 E.U./dL 0.2 - 1.0 E.U./dL   Urinalysis, Microscopic Only - Urine, Clean Catch    Collection Time: 04/13/25 11:03 AM    Specimen: Urine, Clean Catch   Result Value Ref Range    RBC, UA 0-2 None Seen, 0-2 /HPF    WBC, UA Too Numerous to Count (A) None Seen, 0-2 /HPF    Bacteria, UA 2+ (A) None Seen, Trace /HPF    Squamous Epithelial Cells, UA None Seen None Seen, 0-2 /HPF    Hyaline Casts, UA None Seen 0 - 6 /LPF    Methodology Automated Microscopy         If labs were ordered, I independently reviewed the results and considered them in treating the patient.      EMERGENCY DEPARTMENT COURSE and DIFFERENTIAL DIAGNOSIS/MDM:   Vitals:  AS OF 13:11 EDT    BP - 152/81  HR - 63  TEMP - 98.5 °F (36.9 °C) (Oral)  O2 SATS - 96%        Discussion below represents my analysis of pertinent findings related to patient's condition, differential diagnosis, treatment plan and final disposition.      Differential diagnosis:  The differential diagnosis associated with the patient's presentation includes: Bowel obstruction, malignancy, AAA, urinary tract infection, urinary retention, urinary stone, diverticulitis, appendicitis, colitis      Independent interpretations (ECG/rhythm strip/X-ray/US/CT scan): I independently interpreted the patient's abdominal CT and cardiac monitor.  Patient is in sinus rhythm.  There is no obstructive change within the  abdomen.      Additional sources:  Discussed/obtained information from independent historians:   [] Spouse:   [] Parent:   [] Friend:   [] EMS:   [x] Other: Additional history obtained from patient family member at bedside.  She reports that patient has had to use a suppository every time he needs to have a bowel movement for the past week.  External (non-ED) record review:   [] Inpatient record:   [] Office record:   [] Outpatient record:   [] Prior Outpatient labs:   [x] Prior Outpatient radiology: I reviewed abdominal CT from 2023.  Patient presented to the emergency department with similar complaints.  Masslike lesion in the body of the pancreas, liquid stool throughout the colon consistent with diarrhea.   [] Primary Care record:   [x] Outside ED record: Reviewed ED record from 2023.  History of chronic constipation, during that visit reported no good bowel movement for 3 weeks.   [] Other:       Patient's care impacted by:   [] Diabetes   [x] Hypertension   [x] Coronary Artery Disease   [] Cancer   [x] Other: Chronic narcotic use    Care significantly affected by Social Determinants of Health (housing and economic circumstances, unemployment)    [] Yes     [x] No   If yes, Patient's care significantly limited by  Social Determinants of Health including:    [] Inadequate housing    [] Low income    [] Alcoholism and drug addiction in family    [] Problems related to primary support group    [] Unemployment    [] Problems related to employment    [] Other Social Determinants of Health:       Consideration of admission/observation vs discharge: Considered ops admission, however there is no evidence of systemic infection or sepsis.      I considered prescription management with:    [] Pain medication:   [] Antiviral:   [x] Antibiotic: Prescribed Ceftin for urinary tract infection   [] Other:    Additional orders considered but not ordered:  The following testing was considered but ultimately not selected after  discussion with patient/family: I considered an atypical cardiac presentation therefore ECG and troponin testing, however patient's pain is reproducible on exam, this is a chronic issue for the patient without any new symptoms today.  Additionally, no ischemic symptoms including no chest pain or discomfort, shortness of breath, sweating, vomiting.    ED Course:    ED Course as of 04/13/25 1311   Sun Apr 13, 2025   1308 On reexamination, the patient remains very well-appearing and nontoxic.  He is resting comfortably in bed and maintains normal vital signs with improvement in blood pressure compared to arrival value.  His CT scan today shows a stable known 3 cm pancreatic lesion.  There is no evidence of other acute intra-abdominal pathology.  Specifically, there is no evidence of vascular process, bowel obstruction, impaction, or perforation.  Labs suggestive of urinary tract infection, urine culture has been added, will add Ceftin for a course of 10 days given some extension of pain to the flank, however patient is well-appearing, nontoxic, afebrile, no leukocytosis to suggest systemic infection or sepsis.  Renal function is stable compared with previous values. [NS]      ED Course User Index  [NS] Aldo Beck MD         I had a discussion with the patient/family regarding diagnosis, diagnostic results, treatment plan, and medications.  The patient/family indicated understanding of these instructions.  I spent adequate time at the bedside preceding discharge necessary to personally discuss the aftercare instructions, giving patient education, providing explanations of the results of our evaluations/findings, and my decision making to assure that the patient/family understand the plan of care.  Time was allotted to answer questions at that time and throughout the ED course.  Emphasis was placed on timely follow-up after discharge.  I also discussed the potential for the development of an acute emergent  condition requiring further evaluation, admission, or even surgical intervention. I discussed that we found nothing during the visit today indicating the need for further workup, admission, or the presence of an unstable medical condition.  I encouraged the patient to return to the emergency department immediately for ANY concerns, worsening, new complaints, or if symptoms persist and unable to seek follow-up in a timely fashion.  The patient/family expressed understanding and agreement with this plan.  The patient will follow-up with their PCP in 1-2 days for reevaluation.           PROCEDURES:  Procedures    CRITICAL CARE TIME        FINAL IMPRESSION      1. Acute generalized abdominal pain    2. Acute cystitis without hematuria    3. Chronic kidney disease, unspecified CKD stage    4. Advanced age          DISPOSITION/PLAN     ED Disposition       ED Disposition   Discharge    Condition   Stable    Comment   --                 Comment: Please note this report has been produced using speech recognition software.      Aldo Beck MD  Attending Emergency Physician             Aldo Beck MD  04/13/25 [id number]     [facility]

## 2025-04-16 ENCOUNTER — RESULTS FOLLOW-UP (OUTPATIENT)
Dept: EMERGENCY DEPT | Facility: HOSPITAL | Age: OVER 89
End: 2025-04-16
Payer: MEDICARE

## 2025-04-16 LAB
BACTERIA SPEC AEROBE CULT: ABNORMAL
BACTERIA SPEC AEROBE CULT: ABNORMAL

## 2025-04-16 RX ORDER — NITROFURANTOIN 25; 75 MG/1; MG/1
100 CAPSULE ORAL 2 TIMES DAILY
Qty: 20 CAPSULE | Refills: 0 | Status: SHIPPED | OUTPATIENT
Start: 2025-04-16

## 2025-04-16 NOTE — TELEPHONE ENCOUNTER
Spoke to the patient's wife.  She tells me that he is still having urinary symptoms particularly bad smell and frequency.  Although he is doing better.  Did discuss with her the results of the urinalysis.  I will prescribe him Macrobid she tells me that he has been on that in the past has been helpful.  I think it is reasonable.  He also has a follow-up with a urologist coming up for his regular doctor as well.  She was thankful for the call.  I did give him strict warning signs symptoms worsen condition.

## 2025-04-29 RX ORDER — METOPROLOL TARTRATE 25 MG/1
12.5 TABLET, FILM COATED ORAL EVERY 12 HOURS SCHEDULED
Qty: 90 TABLET | Refills: 3 | Status: SHIPPED | OUTPATIENT
Start: 2025-04-29

## 2025-05-08 RX ORDER — APIXABAN 2.5 MG/1
2.5 TABLET, FILM COATED ORAL EVERY 12 HOURS SCHEDULED
Qty: 180 TABLET | Refills: 1 | Status: SHIPPED | OUTPATIENT
Start: 2025-05-08

## 2025-05-08 NOTE — TELEPHONE ENCOUNTER
Lab Results   Component Value Date    WBC 3.84 04/13/2025    HGB 11.6 (L) 04/13/2025    HCT 34.9 (L) 04/13/2025    MCV 99.7 (H) 04/13/2025    PLT 95 (L) 04/13/2025     Labs stable compared to previous results

## (undated) DEVICE — SUT VIC PLS CTD ANTIB 2/0 18IN VCP111G

## (undated) DEVICE — SCRB SURG BACTOSHIELD CHG 4PCT 4OZ

## (undated) DEVICE — GW JAG STR .035IN 450CM

## (undated) DEVICE — TRIPLE LUMEN ERCP CANNULA: Brand: TANDEM XL

## (undated) DEVICE — GLV SURG SENSICARE MICRO PF LF 8 STRL

## (undated) DEVICE — CATH URETRL FLXITP POLLACK STD 5F 70CM

## (undated) DEVICE — MARYLAND JAW LAPAROSCOPIC SEALER/DIVIDER COATED: Brand: LIGASURE

## (undated) DEVICE — RETRIEVAL BALLOON CATHETER: Brand: EXTRACTOR™ PRO RX

## (undated) DEVICE — JACKSON-PRATT 100CC BULB RESERVOIR: Brand: CARDINAL HEALTH

## (undated) DEVICE — PK LAP LASR CHOLE 10

## (undated) DEVICE — LUER-LOK 360°: Brand: CONNECTA, LUER-LOK

## (undated) DEVICE — ENDOPATH XCEL BLUNT TIP TROCARS WITH SMOOTH SLEEVES: Brand: ENDOPATH XCEL

## (undated) DEVICE — TRIPLE LUMEN SPHINCTEROTOME: Brand: ULTRATOME XL

## (undated) DEVICE — SYR LUERLOK 20CC BX/50

## (undated) DEVICE — GLV SURG PREMIERPRO MIC LTX PF SZ8 BRN

## (undated) DEVICE — ADHS LIQ MASTISOL 2/3ML

## (undated) DEVICE — MINI ENDOCUT SCISSOR TIP, DISPOSABLE: Brand: RENEW

## (undated) DEVICE — SCOPE WARMER THAT PRE-WARMS MULTIPLE LAPAROSCOPES.: Brand: JOSNOE MEDICAL INC

## (undated) DEVICE — GLV SURG PREMIERPRO MIC LTX PF SZ7.5 BRN

## (undated) DEVICE — APPL CHLORAPREP W/TINT 26ML BLU

## (undated) DEVICE — ENDOPATH 10MM ENDOSCOPIC BLUNT CHERRY DISSECTORS (12 POUCHES CONTAINING 3 DISSECTORS EACH): Brand: ENDOPATH

## (undated) DEVICE — SUT VIC 0 UR6 27IN VCP603H

## (undated) DEVICE — COVER,LIGHT HANDLE,FLX,1/PK: Brand: MEDLINE INDUSTRIES, INC.

## (undated) DEVICE — BOWL UTIL STRL 32OZ

## (undated) DEVICE — NITINOL WIRE WITH HYDROPHILIC TIP: Brand: SENSOR

## (undated) DEVICE — CONTAINER,SPECIMEN,OR STERILE,4OZ: Brand: MEDLINE

## (undated) DEVICE — ENDOPATH XCEL BLADELESS TROCARS WITH STABILITY SLEEVES: Brand: ENDOPATH XCEL

## (undated) DEVICE — SYR LUERLOK 50ML

## (undated) DEVICE — 2, DISPOSABLE SUCTION/IRRIGATOR WITHOUT DISPOSABLE TIP: Brand: STRYKEFLOW

## (undated) DEVICE — SNAP KOVER: Brand: UNBRANDED

## (undated) DEVICE — INTENDED FOR TISSUE SEPARATION, AND OTHER PROCEDURES THAT REQUIRE A SHARP SURGICAL BLADE TO PUNCTURE OR CUT.: Brand: BARD-PARKER ® STAINLESS STEEL BLADES

## (undated) DEVICE — PK CYSTO-TUR BASIC 10

## (undated) DEVICE — [HIGH FLOW INSUFFLATOR,  DO NOT USE IF PACKAGE IS DAMAGED,  KEEP DRY,  KEEP AWAY FROM SUNLIGHT,  PROTECT FROM HEAT AND RADIOACTIVE SOURCES.]: Brand: PNEUMOSURE

## (undated) DEVICE — BLAKE SILICONE DRAIN, 19 FR ROUND, HUBLESS WITH 1/4" BENDABLE TROCAR: Brand: BLAKE

## (undated) DEVICE — 3M™ STERI-STRIP™ BLEND TONE SKIN CLOSURES, B1557, TAN, 1/2 IN X 4 IN (12MM X 100MM), 6 STRIPS/ENVELOPE: Brand: 3M™ STERI-STRIP™

## (undated) DEVICE — ENDOPOUCH RETRIEVER SPECIMEN RETRIEVAL BAGS: Brand: ENDOPOUCH RETRIEVER

## (undated) DEVICE — DRSNG TELFA PAD NONADH STR 1S 3X8IN

## (undated) DEVICE — SYR LUERLOK 30CC

## (undated) DEVICE — 3M™ TEGADERM™ IV TRANSPARENT FILM DRESSING WITH BORDER 1610: Brand: 3M™ TEGADERM™

## (undated) DEVICE — SUT MNCRYL PLS ANTIB UD 4/0 PS2 18IN

## (undated) DEVICE — SUT ETHLN 2/0 PS 18IN 585H